# Patient Record
Sex: MALE | Race: WHITE | NOT HISPANIC OR LATINO | Employment: UNEMPLOYED | URBAN - NONMETROPOLITAN AREA
[De-identification: names, ages, dates, MRNs, and addresses within clinical notes are randomized per-mention and may not be internally consistent; named-entity substitution may affect disease eponyms.]

---

## 2017-02-06 ENCOUNTER — APPOINTMENT (OUTPATIENT)
Dept: PREADMISSION TESTING | Facility: HOSPITAL | Age: 47
End: 2017-02-06

## 2017-02-07 ENCOUNTER — TELEPHONE (OUTPATIENT)
Dept: ORTHOPEDIC SURGERY | Facility: CLINIC | Age: 47
End: 2017-02-07

## 2017-02-07 NOTE — TELEPHONE ENCOUNTER
Per conversation with Stephanie Silva Front Office  for Dr Bar,   Mr. Mcclure spoke with Renetta, stated he woke up sick this morning and he reschedule his appointment.  She ask if he was going to continue with surgery tomorrow, he stated no he wanted to cancel, his Cancer doctor was afraid of cancer spreading. New appointment with Dr Bar is 2/14/17 10:10    Spoke with Sarah at [facility] OR , she is aware of cancellation.    Dr Bar is aware.

## 2017-02-08 ENCOUNTER — APPOINTMENT (OUTPATIENT)
Dept: CT IMAGING | Facility: HOSPITAL | Age: 47
End: 2017-02-08

## 2017-02-08 ENCOUNTER — HOSPITAL ENCOUNTER (EMERGENCY)
Facility: HOSPITAL | Age: 47
Discharge: HOME | End: 2017-02-08
Attending: FAMILY MEDICINE | Admitting: FAMILY MEDICINE

## 2017-02-08 VITALS
TEMPERATURE: 97.4 F | RESPIRATION RATE: 16 BRPM | BODY MASS INDEX: 45.1 KG/M2 | HEART RATE: 102 BPM | WEIGHT: 315 LBS | SYSTOLIC BLOOD PRESSURE: 134 MMHG | HEIGHT: 70 IN | DIASTOLIC BLOOD PRESSURE: 94 MMHG | OXYGEN SATURATION: 98 %

## 2017-02-08 DIAGNOSIS — J01.00 SUBACUTE MAXILLARY SINUSITIS: ICD-10-CM

## 2017-02-08 DIAGNOSIS — R07.9 CHEST PAIN IN ADULT: Primary | ICD-10-CM

## 2017-02-08 LAB
ALBUMIN SERPL-MCNC: 3.9 G/DL (ref 3.5–5)
ALBUMIN/GLOB SERPL: 1.3 G/DL (ref 1.5–2.5)
ALP SERPL-CCNC: 103 U/L (ref 46–116)
ALT SERPL W P-5'-P-CCNC: 98 U/L (ref 10–44)
ANION GAP SERPL CALCULATED.3IONS-SCNC: 4.2 MMOL/L (ref 3.6–11.2)
APTT PPP: 26.1 SECONDS (ref 24.4–31)
AST SERPL-CCNC: 78 U/L (ref 10–34)
BASOPHILS # BLD AUTO: 0.01 10*3/MM3 (ref 0–0.3)
BASOPHILS NFR BLD AUTO: 0.1 % (ref 0–2)
BILIRUB SERPL-MCNC: 0.4 MG/DL (ref 0.2–1.8)
BUN BLD-MCNC: 12 MG/DL (ref 7–21)
BUN/CREAT SERPL: 15 (ref 7–25)
CALCIUM SPEC-SCNC: 9.1 MG/DL (ref 7.7–10)
CHLORIDE SERPL-SCNC: 101 MMOL/L (ref 99–112)
CO2 SERPL-SCNC: 29.8 MMOL/L (ref 24.3–31.9)
CREAT BLD-MCNC: 0.8 MG/DL (ref 0.43–1.29)
DEPRECATED RDW RBC AUTO: 38.1 FL (ref 37–54)
EOSINOPHIL # BLD AUTO: 0.07 10*3/MM3 (ref 0–0.7)
EOSINOPHIL NFR BLD AUTO: 0.9 % (ref 0–5)
ERYTHROCYTE [DISTWIDTH] IN BLOOD BY AUTOMATED COUNT: 12.5 % (ref 11.5–14.5)
GFR SERPL CREATININE-BSD FRML MDRD: 105 ML/MIN/1.73
GLOBULIN UR ELPH-MCNC: 3.1 GM/DL
GLUCOSE BLD-MCNC: 181 MG/DL (ref 70–110)
HCT VFR BLD AUTO: 45 % (ref 42–52)
HGB BLD-MCNC: 15.1 G/DL (ref 14–18)
IMM GRANULOCYTES # BLD: 0.03 10*3/MM3 (ref 0–0.03)
IMM GRANULOCYTES NFR BLD: 0.4 % (ref 0–0.5)
INR PPP: 0.96 (ref 0.8–1.1)
LYMPHOCYTES # BLD AUTO: 1.51 10*3/MM3 (ref 1–3)
LYMPHOCYTES NFR BLD AUTO: 20.5 % (ref 21–51)
MCH RBC QN AUTO: 28.5 PG (ref 27–33)
MCHC RBC AUTO-ENTMCNC: 33.6 G/DL (ref 33–37)
MCV RBC AUTO: 84.9 FL (ref 80–94)
MONOCYTES # BLD AUTO: 0.61 10*3/MM3 (ref 0.1–0.9)
MONOCYTES NFR BLD AUTO: 8.3 % (ref 0–10)
NEUTROPHILS # BLD AUTO: 5.14 10*3/MM3 (ref 1.4–6.5)
NEUTROPHILS NFR BLD AUTO: 69.8 % (ref 30–70)
OSMOLALITY SERPL CALC.SUM OF ELEC: 274.4 MOSM/KG (ref 273–305)
PLATELET # BLD AUTO: 177 10*3/MM3 (ref 130–400)
PMV BLD AUTO: 9.8 FL (ref 6–10)
POTASSIUM BLD-SCNC: 3.9 MMOL/L (ref 3.5–5.3)
PROT SERPL-MCNC: 7 G/DL (ref 6–8)
PROTHROMBIN TIME: 10.8 SECONDS (ref 9.8–11.9)
RBC # BLD AUTO: 5.3 10*6/MM3 (ref 4.7–6.1)
SODIUM BLD-SCNC: 135 MMOL/L (ref 135–153)
WBC NRBC COR # BLD: 7.37 10*3/MM3 (ref 4.5–12.5)

## 2017-02-08 PROCEDURE — 96374 THER/PROPH/DIAG INJ IV PUSH: CPT

## 2017-02-08 PROCEDURE — 71275 CT ANGIOGRAPHY CHEST: CPT | Performed by: RADIOLOGY

## 2017-02-08 PROCEDURE — 25010000002 KETOROLAC TROMETHAMINE PER 15 MG: Performed by: FAMILY MEDICINE

## 2017-02-08 PROCEDURE — 85025 COMPLETE CBC W/AUTO DIFF WBC: CPT | Performed by: FAMILY MEDICINE

## 2017-02-08 PROCEDURE — 36415 COLL VENOUS BLD VENIPUNCTURE: CPT

## 2017-02-08 PROCEDURE — 71275 CT ANGIOGRAPHY CHEST: CPT

## 2017-02-08 PROCEDURE — 85730 THROMBOPLASTIN TIME PARTIAL: CPT | Performed by: FAMILY MEDICINE

## 2017-02-08 PROCEDURE — 85610 PROTHROMBIN TIME: CPT | Performed by: FAMILY MEDICINE

## 2017-02-08 PROCEDURE — 99283 EMERGENCY DEPT VISIT LOW MDM: CPT

## 2017-02-08 PROCEDURE — 80053 COMPREHEN METABOLIC PANEL: CPT | Performed by: FAMILY MEDICINE

## 2017-02-08 PROCEDURE — 0 IOPAMIDOL 61 % SOLUTION: Performed by: FAMILY MEDICINE

## 2017-02-08 RX ORDER — MELOXICAM 15 MG/1
15 TABLET ORAL DAILY
Qty: 15 TABLET | Refills: 0 | Status: SHIPPED | OUTPATIENT
Start: 2017-02-08 | End: 2018-10-19

## 2017-02-08 RX ORDER — CETIRIZINE HYDROCHLORIDE 10 MG/1
10 TABLET ORAL ONCE
Status: COMPLETED | OUTPATIENT
Start: 2017-02-08 | End: 2017-02-08

## 2017-02-08 RX ORDER — FLUTICASONE PROPIONATE 50 MCG
2 SPRAY, SUSPENSION (ML) NASAL 2 TIMES DAILY
Status: DISCONTINUED | OUTPATIENT
Start: 2017-02-08 | End: 2017-02-08 | Stop reason: HOSPADM

## 2017-02-08 RX ORDER — FLUTICASONE PROPIONATE 50 MCG
2 SPRAY, SUSPENSION (ML) NASAL 2 TIMES DAILY
Qty: 1 EACH | Refills: 0 | Status: SHIPPED | OUTPATIENT
Start: 2017-02-08 | End: 2017-03-10

## 2017-02-08 RX ORDER — KETOROLAC TROMETHAMINE 30 MG/ML
30 INJECTION, SOLUTION INTRAMUSCULAR; INTRAVENOUS ONCE
Status: COMPLETED | OUTPATIENT
Start: 2017-02-08 | End: 2017-02-08

## 2017-02-08 RX ORDER — OXYMETAZOLINE HYDROCHLORIDE 0.05 G/100ML
3 SPRAY NASAL AS NEEDED
Status: DISCONTINUED | OUTPATIENT
Start: 2017-02-08 | End: 2017-02-08 | Stop reason: HOSPADM

## 2017-02-08 RX ORDER — AZELASTINE HCL 205.5 UG/1
2 SPRAY NASAL 2 TIMES DAILY
Qty: 30 ML | Refills: 0 | Status: SHIPPED | OUTPATIENT
Start: 2017-02-08 | End: 2017-03-29

## 2017-02-08 RX ORDER — CETIRIZINE HYDROCHLORIDE 10 MG/1
10 TABLET ORAL DAILY
Qty: 30 TABLET | Refills: 0 | Status: SHIPPED | OUTPATIENT
Start: 2017-02-08 | End: 2017-03-10

## 2017-02-08 RX ORDER — SODIUM CHLORIDE 0.9 % (FLUSH) 0.9 %
10 SYRINGE (ML) INJECTION AS NEEDED
Status: DISCONTINUED | OUTPATIENT
Start: 2017-02-08 | End: 2017-02-08 | Stop reason: HOSPADM

## 2017-02-08 RX ADMIN — FLUTICASONE PROPIONATE 2 SPRAY: 50 SPRAY, METERED NASAL at 12:00

## 2017-02-08 RX ADMIN — OXYMETAZOLINE HYDROCHLORIDE 3 SPRAY: 5 SPRAY NASAL at 12:26

## 2017-02-08 RX ADMIN — CETIRIZINE HYDROCHLORIDE 10 MG: 10 TABLET, FILM COATED ORAL at 12:00

## 2017-02-08 RX ADMIN — IOPAMIDOL 100 ML: 612 INJECTION, SOLUTION INTRAVENOUS at 13:15

## 2017-02-08 RX ADMIN — KETOROLAC TROMETHAMINE 30 MG: 30 INJECTION, SOLUTION INTRAMUSCULAR; INTRAVENOUS at 12:00

## 2017-02-08 NOTE — ED PROVIDER NOTES
Subjective   History of Present Illness  45 y/o M w/h/o DVT and PE p/w back pain that has been ongoing for 3 weeks. Pt states that this feels similar to previous episodes of PE he has had in the past. Pt has a family h/o PE but denies clotting disorders. Pt has had multiple PE's but is not currently anticoagulated. 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   Review of Systems   Constitutional: Negative for activity change, appetite change, chills and fever.   HENT: Negative for congestion, trouble swallowing and voice change.    Eyes: Negative for pain and redness.   Respiratory: Negative for apnea, cough, shortness of breath and wheezing.    Cardiovascular: Positive for chest pain. Negative for palpitations and leg swelling.   Gastrointestinal: Negative for abdominal distention, abdominal pain, constipation, nausea and vomiting.   Genitourinary: Negative for difficulty urinating, dysuria and flank pain.   Musculoskeletal: Positive for back pain. Negative for arthralgias.   Hematological: Does not bruise/bleed easily.       Past Medical History   Diagnosis Date   • Cancer    • Degenerative joint disease    • Diabetes mellitus    • DVT, lower extremity      left 2013   • Hypertension    • Peroneal DVT (deep venous thrombosis)      in 1998 and dvt 2010   • Spinal stenosis    • Urolithiasis        No Known Allergies    Past Surgical History   Procedure Laterality Date   • Cystoscopy w/ litholapaxy / ehl     • Colonoscopy     • Inner ear surgery         Family History   Problem Relation Age of Onset   • Other Other      deveral siblings havd had early onset DVTs       Social History     Social History   • Marital status:      Spouse name: N/A   • Number of children: N/A   • Years of education: N/A     Social History Main Topics   • Smoking status: Former Smoker     Years: 20.00   • Smokeless tobacco: Current User     Types: Chew      Comment: occasionally uses chew once a year     • Alcohol use No      Comment: beer occasionally   • Drug use: No   • Sexual activity: Not Asked     Other Topics Concern   • None     Social History Narrative           Objective   Physical Exam   Constitutional: He is oriented to person, place, and time. Vital signs are normal. He appears well-developed and well-nourished. He is active.   HENT:   Head: Normocephalic and atraumatic.   Right Ear: Hearing and external ear normal.   Left Ear: Hearing and external ear normal.   Nose: Nose normal.   Mouth/Throat: Oropharynx is clear and moist.   Eyes: Conjunctivae and EOM are normal. Pupils are equal, round, and reactive to light.   Neck: Trachea normal, normal range of motion, full passive range of motion without pain and phonation normal. Neck supple.   Cardiovascular: Normal rate, regular rhythm and normal heart sounds.    Pulmonary/Chest: Effort normal and breath sounds normal.   Abdominal: Soft. Normal appearance.   Musculoskeletal: Normal range of motion.   Neurological: He is alert and oriented to person, place, and time.   Skin: Skin is warm, dry and intact.   Psychiatric: He has a normal mood and affect. His speech is normal and behavior is normal. Judgment and thought content normal. Cognition and memory are normal.   Nursing note and vitals reviewed.      Procedures         ED Course  ED Course   Value Comment By Time   CT Chest Pulmonary Embolism With Contrast (Reviewed) Iam Hatfield MD 02/08 1341                  MDM  Number of Diagnoses or Management Options  Chest pain in adult: new and requires workup  Subacute maxillary sinusitis: new and requires workup     Amount and/or Complexity of Data Reviewed  Clinical lab tests: ordered and reviewed  Tests in the radiology section of CPT®: reviewed and ordered    Risk of Complications, Morbidity, and/or Mortality  Presenting problems: high  Diagnostic procedures: high  Management options: high    Patient Progress  Patient progress:  improved      Final diagnoses:   Chest pain in adult   Subacute maxillary sinusitis            Iam Hatfield MD  02/08/17 4237

## 2017-02-14 ENCOUNTER — OFFICE VISIT (OUTPATIENT)
Dept: ORTHOPEDIC SURGERY | Facility: CLINIC | Age: 47
End: 2017-02-14

## 2017-02-14 VITALS — WEIGHT: 315 LBS | BODY MASS INDEX: 45.1 KG/M2 | HEIGHT: 70 IN

## 2017-02-14 DIAGNOSIS — M19.019 OSTEOARTHRITIS OF ACROMIOCLAVICULAR JOINT: Primary | ICD-10-CM

## 2017-02-14 PROCEDURE — 20605 DRAIN/INJ JOINT/BURSA W/O US: CPT | Performed by: ORTHOPAEDIC SURGERY

## 2017-02-14 RX ORDER — METHYLPREDNISOLONE ACETATE 40 MG/ML
80 INJECTION, SUSPENSION INTRA-ARTICULAR; INTRALESIONAL; INTRAMUSCULAR; SOFT TISSUE ONCE
Status: COMPLETED | OUTPATIENT
Start: 2017-02-14 | End: 2017-02-14

## 2017-02-14 RX ORDER — BUPIVACAINE HYDROCHLORIDE 5 MG/ML
5 INJECTION, SOLUTION EPIDURAL; INTRACAUDAL ONCE
Status: COMPLETED | OUTPATIENT
Start: 2017-02-14 | End: 2017-02-14

## 2017-02-14 RX ADMIN — METHYLPREDNISOLONE ACETATE 80 MG: 40 INJECTION, SUSPENSION INTRA-ARTICULAR; INTRALESIONAL; INTRAMUSCULAR; SOFT TISSUE at 14:00

## 2017-02-14 RX ADMIN — BUPIVACAINE HYDROCHLORIDE 5 ML: 5 INJECTION, SOLUTION EPIDURAL; INTRACAUDAL at 14:00

## 2017-02-14 NOTE — PROGRESS NOTES
Alek Munoz   :1970    Date of encounter:2017        HPI:  Alek Munoz is a 46 y.o. male who returns today for follow-up of right shoulder pain.  We've previously discussed proceeding with a arthroscopy of the right shoulder with acromioplasty and excision of the lateral clavicle.  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  And his oncologist recommend holding off on surgery at this time.  He is requesting another injection into the acromioclavicular joint.      Exam:  On examination he still is markedly tenderness along the acromioclavicular joint.  He has positive crossarm adduction.  His neurovascular status is intact      Assessment    ICD-10-CM ICD-9-CM   1. Osteoarthritis of acromioclavicular joint M19.019 715.91       Plan:  A 46-year-old male with acromioclavicular joint arthritis.  Were unable to proceed with surgery at this time.  We will give him another injection to try to alleviate his pain.  Today we proceeded with 80 mg of Depo-Medrol with Marcaine block intra-articular into the acromioclavicular joint of the right shoulder.  He'll return back with any recurrence of symptoms.    Written by, Shea POWELL, acting as a scribe for Dr. Yosvany LUJAN, Aubrey Bar MD, personally performed the services described in this documentation as scribed by the above named individual in my presence, and it is both accurate and complete.  2017  4:37 PM      CC Dr. Argueta

## 2017-03-29 ENCOUNTER — OFFICE VISIT (OUTPATIENT)
Dept: ONCOLOGY | Facility: CLINIC | Age: 47
End: 2017-03-29

## 2017-03-29 VITALS
RESPIRATION RATE: 20 BRPM | HEART RATE: 81 BPM | BODY MASS INDEX: 46.92 KG/M2 | TEMPERATURE: 97.5 F | WEIGHT: 315 LBS | OXYGEN SATURATION: 98 % | SYSTOLIC BLOOD PRESSURE: 140 MMHG | DIASTOLIC BLOOD PRESSURE: 86 MMHG

## 2017-03-29 DIAGNOSIS — D11.0 BENIGN NEOPLASM PAROTID GLAND: Primary | ICD-10-CM

## 2017-03-29 PROCEDURE — 99214 OFFICE O/P EST MOD 30 MIN: CPT | Performed by: INTERNAL MEDICINE

## 2017-03-29 RX ORDER — HYDROXYZINE HYDROCHLORIDE 25 MG/1
TABLET, FILM COATED ORAL
COMMUNITY
Start: 2017-03-23 | End: 2018-11-13

## 2017-03-29 RX ORDER — MECLIZINE HYDROCHLORIDE 25 MG/1
TABLET ORAL
COMMUNITY
Start: 2017-03-23 | End: 2018-10-19

## 2017-03-29 RX ORDER — HYDROCODONE BITARTRATE AND ACETAMINOPHEN 7.5; 325 MG/1; MG/1
TABLET ORAL
COMMUNITY
Start: 2017-03-22 | End: 2017-11-29

## 2017-03-29 NOTE — PROGRESS NOTES
Name:  Alek Munoz  :  1970  Date:  3/29/2017     PRIMARY CARE PHYSICIAN  Stephen Argueta MD    REASON FOR FOLLOWUP  1. Benign neoplasm parotid gland      CHIEF COMPLAINT  Right-sided parotid gland mass and chronic back pain.     Dear Dr. Argueta,    HISTORY OF PRESENT ILLNESS:   I saw Mr. Munoz in follow up today in our medical oncology clinic.  As you are aware, he is a pleasant, 46 y.o., white male who was diagnosed with a DVT in early 2013 after presenting to the [facility] ED with a two day history of worsening left leg pain and swelling. He was briefly hospitalized in order to start anticoagulation with Xarelto. I initially saw him during this hospitalization. At that time, he reported a history of severe traumatic injury in , when a truck he was working underneath rolled off a lift and landed on him. This accident resulted in multiple contusions and a displaced right upper extremity (for which he refused surgery). Apparently, both around that time (in ), and again in [id number] , he experienced a DVT. He also stated that multiple family members (including several siblings) have all had DVTs in the recent past (although he has been unable to provide any specifics). Of note, around the time of the most recent, documented DVT (in 2013), he had been much less mobile than usual for the previous few months as he had been experiencing worse problems with his chronic back and sciatic nerve pain (which have both been chronic issues si nce the truck incident). Regardless, with his personal and family history, as well as a positive antiphosphatidylserine antibody (identified on the hypercoagulable panel ordered in our office in 2014), indefinite anticoagulation (and he was tolerating Xarelto well) was recommended. He was subsequently lost to follow up (by 2014), but re-presented to our clinic in mid-2015 with a new issue: a persistent, right parotid  gland mass. He first noticed this enlarging area around November/December 2014. 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 At the time of his re-presentation to our clinic, further evaluation with a new PET scan and FNA biopsy were ordered; and these confirmed a relatively benign tumor, with no noticeable progression during the previous ~ five to six months.  He has elected to continue to defer any consideration of a debulking/curative surgery to date, due to the risks involved (of bleeding, facial paralysis, etc.). Meanwhile, he discontinued Xarelto on his own in mid-2014 and has had no issues with recurrent, thrombotic events since then.    INTERIM HISTORY:  Mr. Munoz returns to clinic today for follow up again accompanied by his wife. Other than his chronic back pain and occasional sharp pains near the right parotid gland mass (along with decreased beard growth in this area), he continues to feel overall well and has no specific complaints. He did meet with [facility] since we last saw him ~ five months ago, and they are willing to perform the resection; however he remains reluctant to follow through with the surgery.    Past Medical History:   Diagnosis Date   • Cancer    • Degenerative joint disease    • Diabetes mellitus    • DVT, lower extremity     left 2013   • Hypertension    • Peroneal DVT (deep venous thrombosis)     in 1998 and dvt 2010   • Spinal stenosis    • Urolithiasis        Past Surgical History:   Procedure Laterality Date   • COLONOSCOPY     • CYSTOSCOPY W/ LITHOLAPAXY / EHL     • INNER EAR SURGERY         Social History     Social History   • Marital status:      Spouse name: N/A   • Number of  children: N/A   • Years of education: N/A     Occupational History   • Not on file.     Social History Main Topics   • Smoking status: Former Smoker     Years: 20.00   • Smokeless tobacco: Current User     Types: Chew      Comment: occasionally uses chew once a year    • Alcohol use No      Comment: beer occasionally   • Drug use: No   • Sexual activity: Not on file     Other Topics Concern   • Not on file     Social History Narrative       Family History   Problem Relation Age of Onset   • Other Other      deveral siblings havd had early onset DVTs       No Known Allergies    Current Outpatient Prescriptions   Medication Sig Dispense Refill   • butalbital-acetaminophen-caffeine (FIORICET, ESGIC) -40 MG per tablet      • gabapentin (NEURONTIN) 800 MG tablet      • hydrochlorothiazide (HYDRODIURIL) 25 MG tablet      • HYDROcodone-acetaminophen (NORCO) 7.5-325 MG per tablet      • hydrOXYzine (ATARAX) 25 MG tablet      • lidocaine-prilocaine (EMLA) 2.5-2.5 % cream      • meclizine (ANTIVERT) 25 MG tablet      • meloxicam (MOBIC) 15 MG tablet Take 1 tablet by mouth Daily. 15 tablet 0   • omeprazole (PriLOSEC) 20 MG capsule      • PHENTERMINE HCL PO Take  by mouth daily.     • potassium chloride (K-DUR,KLOR-CON) 10 MEQ CR tablet      • tiZANidine (ZANAFLEX) 4 MG tablet      • vitamin D (ERGOCALCIFEROL) 25854 UNITS capsule capsule Take 50,000 Units by mouth 1 (one) time per week.       No current facility-administered medications for this visit.        REVIEW OF SYSTEMS  CONSTITUTIONAL: No fever, chills, night sweats or weight loss.    EYES: No blurry vision, diplopia or other vision changes.    ENT: No hearing loss, nosebleeds or sore throat. Parotid gland mass, as per the HPI above.    CARDIOVASCULAR: No palpitations, arrhythmia, syncopal episodes or edema.    PULMONARY: No shortness of breath, hemoptysis, wheezing or chronic cough.    GASTROINTESTINAL: No nausea or vomiting. No constipation or diarrhea. No  change in appetite. No abdominal pain.    GENITOURINARY: No hematuria, kidney stones or frequent urination.    MUSCULOSKELETAL: Chronic back pain, stable.  INTEGUMENTARY: No rashes or pruritus.    ENDOCRINE: No excessive thirst or hot flashes.    HEMATOLOGIC: No history of free bleeding or spontaneous bleeding. Thrombosis history as above, last confirmed LLE DVT in December 2013.    IMMUNOLOGIC: No allergies or frequent infections.    NEUROLOGIC: No numbness, tingling, seizures or weakness.    PSYCHIATRIC: No anxiety or depression.      PHYSICAL EXAMINATION    /86  Pulse 81  Temp 97.5 °F (36.4 °C) (Oral)   Resp 20  Wt (!) 327 lb (148 kg)  SpO2 98%  BMI 46.92 kg/m2    GENERAL:  A well-developed, well-nourished, morbidly obese, white male in no acute distress.  HEENT:  Pupils equally round and reactive to light.  Extraocular muscles intact.  Enlarged, firm, right-sided parotid mass, very slowly, but steadily, increasing in size.  CARDIOVASCULAR:  Regular rate and rhythm.  No murmurs, gallops or rubs.  LUNGS:  Clear to auscultation bilaterally.  ABDOMEN:  Soft, nontender, nondistended with positive bowel sounds.  EXTREMITIES:  No clubbing, cyanosis or edema bilaterally.  SKIN:  No rashes or petechiae.  NEURO:  Cranial nerves grossly intact.  No focal deficits.  PSYCH:  Alert and oriented x3.    LABORATORY    Lab Results   Component Value Date    WBC 7.37 02/08/2017    HGB 15.1 02/08/2017    HCT 45.0 02/08/2017    MCV 84.9 02/08/2017     02/08/2017    NEUTROABS 5.14 02/08/2017       Lab Results   Component Value Date     02/08/2017    K 3.9 02/08/2017     02/08/2017    CO2 29.8 02/08/2017    BUN 12 02/08/2017    CREATININE 0.80 02/08/2017    GLUCOSE 181 (H) 02/08/2017    CALCIUM 9.1 02/08/2017    AST 78 (H) 02/08/2017    ALT 98 (H) 02/08/2017    ALKPHOS 103 02/08/2017    BILITOT 0.4 02/08/2017    PROTEINTOT 7.0 02/08/2017    ALBUMIN 3.90 02/08/2017     Hypercoagulable studies (03/19/2014):     Factor V Leiden mutation: Negative; Factor II, DNA analysis (Prothrombin [id number] mutation): Negative    Antithrombin activity: 98%; Antithrombin antigen: 86%    Protein C antigen: 100%; Protein C-functional: 149%; Activated Protein C resistance: 3.0    Protein S total: 110%; Protein S free: 83%; Protein S-functional: 115%      Beta-2 glycoprotein I Ab, IgG: < 9; IgA: < 9; IgM: < 9    Anticardiolipin Ab, IgG: < 9; IgA: < 9; IgM: < 9    Antiphosphatidylserine Ig; IgA: <1; IgM: 6      Lupus anticoagulant Reflex: PTT-LA: 49.6; dRVVT: 68.8; dRVVT Mix: 50.2; dRVVT confirm: 1.2    (No lupus anticoagulant detected)      IMAGING  Duplex scan venous left lower extremity (2013):    Impression: Exam positive for DVT in the left popliteal and peroneal veins.      Duplex scan venous left lower extremity (2014):    Impression: Residual deep vein thrombosis in the left peroneal vein but the popliteal vein does not show deep vein thrombosis at this time.      CT neck soft tissue with contrast (2015):    Impression: 2.6 cm mass in the right parotid gland.    NM PET with fusion CT, skull base to mid-thigh (2015, at ):    Impression: Intensely hypermetabolic mass centered within the deep lobe of the right parotid gland without evidence of abnormal FDG hypermetabolism elsewhere.      NM PET with fusion CT, skull base to mid-thigh (2015):  Impression: Intense hypermetabolic activity within the right masseter muscle. There is no hypermetabolic activity within the right parotid mass. Physiologic hypermetabolism only elsewhere.    CT soft tissue neck with contrast (2016):  Impression: 3.3 cm well-circumscribed right parotid gland mass.    CT chest with contrast (2016):  Impression: Stable evaluation of the chest.    PATHOLOGY  Parotid gland, right, mass, superficial FNA (2015):    Malignant spindled and epithelioid neoplasm.      Parotid gland, right, mass, superficial FNA (  12/01/2015):    Malignant spindled and epithelioid neoplasm.     IMPRESSION AND PLAN  Mr. Munoz is a 46 y.o., white male with:  1. Parotid gland mass: Biopsied in both May and December 2015, with, both times, a diagnosis of a non-specific malignancy. A PET scan in June 2015 at the [facility] (summarized above) showed no evidence of hypermetabolic activity anywhere outside the parotid gland; and, a repeat PET scan about five months later, showed no evidence of either progression or metastatic spread. The parotid mass itself was not hypermetabolic at that time, while the cheek around it (likely due to inflammation) was the only area that was (hypermetabolic). I have had multiple long discussions with the patient and his wife in clinic over the past ~ two years regarding these findings. While a specific diagnosis has eluded us, the good news has been that this has been a fairly benign tumor (the most recent repeat imaging, CTs from mid-August 2016, summarized above, again show continued slow growth of the parotid tumor without evidence of disease elsewhere). Therefore, excisional surgery is the mainstay of both diagnosis and therapy (which has always been, and still is, recommended, as this tumor will undoubtedly only steadily continue to grow and cause progressive, localized symptoms over time), as there are no chemotherapeutic and/or radiotherapeutic options for such a slowly growing tumor. Unfortunately, given the mass' location within the parotid gland (and consequently, its involvement of fragile structures such as the facial nerve), this surgery would likely be relatively complicated. At his previous office visit (in October 2016), he was finally agreeable to being referred to an academic center for an additional surgical opinion. [facility] has now agreed to perform the resection, but the patient remains reluctant to actually go through with the procedure. Until we (or his wife) can convince him,  we will continue to monitor this issue with periodic repeat scans. We will see him back in clinic in one month with repeat CTs of the neck and chest with contrast.  2. DVT: The patient has experienced two other acute events besides the documented lower extremity DVT in December 2013 (a PE at the time of his injury in 1998 and a DVT in his injured, right upper arm around 2008 or 2009) . Regardless, the patient's history of severe traumatic injury has resulted in permanent damage to his right arm as well as to occasional periods of relative immobility (due to chronic, and at times worsening, back and referred nerve damage and pain). These are significant, chronic factors that have, and will continue to, increase his risk of thrombosis. Additionally, the results of the hypercoagulable studies sent in March 2014 (which are summarized above), while largely unremarkable, do show the possible presence of an antiphosphatidylserine antibody (the dRVVT results are consistent with, and likely due to, taking Xarelto; the other studies were within normal limits). He completed a total of six months of treatment by summer 2015; but, ultimately, his combination of lab results and clinical history led us to recommend indefinite anticoagulation. However, the patient discontinued the Xarelto in mid-2014 on his own. He has not had any issues with recurrent thrombotic events since then. Continue to monitor.    The patient and his wife were in agreement with these plans.      It is a pleasure to participate in Mr. Munoz's care. Please do not hesitate to call with any questions or concerns that you may have.    A total of 30 minutes were spent coordinating this patient’s care in clinic today; 25 minutes of which were face-to-face with the patient, reviewing his interim medical history and counseling on the current evaluation and followup plan.  All questions were answered to their satisfaction.    FOLLOW UP  With [facility] for  surgical resection of the right-sided parotid mass as soon as the patient is agreeable. Return to our clinic in 1 month with repeat CTs of the neck and chest with contrast.      This document was electronically signed by HUE Bonilla MD March 29, 2017 12:01 PM      CC: MD Sherman Walker MD

## 2017-04-20 DIAGNOSIS — D11.0 BENIGN NEOPLASM PAROTID GLAND: Primary | ICD-10-CM

## 2017-04-21 ENCOUNTER — APPOINTMENT (OUTPATIENT)
Dept: CT IMAGING | Facility: HOSPITAL | Age: 47
End: 2017-04-21
Attending: INTERNAL MEDICINE

## 2017-05-05 ENCOUNTER — HOSPITAL ENCOUNTER (OUTPATIENT)
Dept: PET IMAGING | Facility: HOSPITAL | Age: 47
Discharge: HOME | End: 2017-05-05
Attending: INTERNAL MEDICINE | Admitting: INTERNAL MEDICINE

## 2017-05-05 DIAGNOSIS — D11.0 BENIGN NEOPLASM PAROTID GLAND: ICD-10-CM

## 2017-05-05 PROCEDURE — 78815 PET IMAGE W/CT SKULL-THIGH: CPT

## 2017-05-05 PROCEDURE — A9552 F18 FDG: HCPCS | Performed by: INTERNAL MEDICINE

## 2017-05-05 PROCEDURE — 0 FLUDEOXYGLUCOSE F18 SOLUTION: Performed by: INTERNAL MEDICINE

## 2017-05-05 PROCEDURE — 78815 PET IMAGE W/CT SKULL-THIGH: CPT | Performed by: RADIOLOGY

## 2017-05-05 RX ADMIN — FLUDEOXYGLUCOSE F18 1 DOSE: 300 INJECTION INTRAVENOUS at 08:17

## 2017-05-10 ENCOUNTER — OFFICE VISIT (OUTPATIENT)
Dept: ONCOLOGY | Facility: CLINIC | Age: 47
End: 2017-05-10

## 2017-05-10 VITALS
DIASTOLIC BLOOD PRESSURE: 85 MMHG | RESPIRATION RATE: 18 BRPM | WEIGHT: 315 LBS | TEMPERATURE: 97.7 F | OXYGEN SATURATION: 97 % | SYSTOLIC BLOOD PRESSURE: 132 MMHG | HEART RATE: 72 BPM | BODY MASS INDEX: 46.42 KG/M2

## 2017-05-10 DIAGNOSIS — D11.0 BENIGN NEOPLASM PAROTID GLAND: Primary | ICD-10-CM

## 2017-05-10 PROCEDURE — 99214 OFFICE O/P EST MOD 30 MIN: CPT | Performed by: INTERNAL MEDICINE

## 2017-10-06 ENCOUNTER — HOSPITAL ENCOUNTER (EMERGENCY)
Facility: HOSPITAL | Age: 47
Discharge: HOME | End: 2017-10-06
Attending: EMERGENCY MEDICINE | Admitting: EMERGENCY MEDICINE

## 2017-10-06 VITALS
BODY MASS INDEX: 45.1 KG/M2 | HEART RATE: 94 BPM | RESPIRATION RATE: 18 BRPM | WEIGHT: 315 LBS | SYSTOLIC BLOOD PRESSURE: 149 MMHG | DIASTOLIC BLOOD PRESSURE: 93 MMHG | TEMPERATURE: 98.3 F | HEIGHT: 70 IN | OXYGEN SATURATION: 96 %

## 2017-10-06 DIAGNOSIS — J01.00 ACUTE NON-RECURRENT MAXILLARY SINUSITIS: Primary | ICD-10-CM

## 2017-10-06 PROCEDURE — 25010000002 DEXAMETHASONE PER 1 MG: Performed by: PHYSICIAN ASSISTANT

## 2017-10-06 PROCEDURE — 25010000002 CEFTRIAXONE PER 250 MG: Performed by: PHYSICIAN ASSISTANT

## 2017-10-06 PROCEDURE — 96372 THER/PROPH/DIAG INJ SC/IM: CPT

## 2017-10-06 PROCEDURE — 99283 EMERGENCY DEPT VISIT LOW MDM: CPT

## 2017-10-06 RX ORDER — HYDROCODONE BITARTRATE AND ACETAMINOPHEN 10; 325 MG/1; MG/1
1 TABLET ORAL 3 TIMES DAILY
COMMUNITY
End: 2018-11-13

## 2017-10-06 RX ORDER — LIDOCAINE HYDROCHLORIDE 10 MG/ML
2.1 INJECTION, SOLUTION EPIDURAL; INFILTRATION; INTRACAUDAL; PERINEURAL ONCE
Status: COMPLETED | OUTPATIENT
Start: 2017-10-06 | End: 2017-10-06

## 2017-10-06 RX ORDER — METHYLPREDNISOLONE 4 MG/1
TABLET ORAL
Qty: 21 TABLET | Refills: 0 | Status: SHIPPED | OUTPATIENT
Start: 2017-10-06 | End: 2017-11-29

## 2017-10-06 RX ORDER — CEFDINIR 300 MG/1
300 CAPSULE ORAL 2 TIMES DAILY
Qty: 20 CAPSULE | Refills: 0 | Status: SHIPPED | OUTPATIENT
Start: 2017-10-06 | End: 2017-11-29

## 2017-10-06 RX ORDER — DEXAMETHASONE SODIUM PHOSPHATE 4 MG/ML
8 INJECTION, SOLUTION INTRA-ARTICULAR; INTRALESIONAL; INTRAMUSCULAR; INTRAVENOUS; SOFT TISSUE ONCE
Status: COMPLETED | OUTPATIENT
Start: 2017-10-06 | End: 2017-10-06

## 2017-10-06 RX ORDER — CEFTRIAXONE 1 G/1
1000 INJECTION, POWDER, FOR SOLUTION INTRAMUSCULAR; INTRAVENOUS ONCE
Status: COMPLETED | OUTPATIENT
Start: 2017-10-06 | End: 2017-10-06

## 2017-10-06 RX ADMIN — DEXAMETHASONE SODIUM PHOSPHATE 8 MG: 4 INJECTION, SOLUTION INTRAMUSCULAR; INTRAVENOUS at 02:49

## 2017-10-06 RX ADMIN — LIDOCAINE HYDROCHLORIDE 2.1 ML: 10 INJECTION, SOLUTION EPIDURAL; INFILTRATION; INTRACAUDAL; PERINEURAL at 02:50

## 2017-10-06 RX ADMIN — CEFTRIAXONE 1000 MG: 1 INJECTION, POWDER, FOR SOLUTION INTRAMUSCULAR; INTRAVENOUS at 02:49

## 2017-10-06 NOTE — ED NOTES
Pt c/o sore throat and left sided ear ache since yesterday, states he feels like he might have strep or and ear infection. States this has been bothering him since yesterday morning     Gia Ramirez RN  10/06/17 2437

## 2017-10-06 NOTE — ED PROVIDER NOTES
Subjective   Patient is a 47 y.o. male presenting with URI.   History provided by:  Patient  URI   Presenting symptoms: congestion, ear pain and sore throat    Presenting symptoms: no fever    Severity:  Moderate  Onset quality:  Sudden  Duration:  1 day  Timing:  Constant  Progression:  Worsening  Chronicity:  New  Relieved by:  Nothing  Ineffective treatments:  None tried      Review of Systems   Constitutional: Negative.  Negative for fever.   HENT: Positive for congestion, ear pain and sore throat.    Respiratory: Negative.    Cardiovascular: Negative.  Negative for chest pain.   Gastrointestinal: Negative.  Negative for abdominal pain.   Endocrine: Negative.    Genitourinary: Negative.  Negative for dysuria.   Skin: Negative.    Neurological: Negative.    Psychiatric/Behavioral: Negative.    All other systems reviewed and are negative.      Past Medical History:   Diagnosis Date   • Cancer    • Degenerative joint disease    • Diabetes mellitus    • DVT, lower extremity     left 2013   • Hypertension    • Peroneal DVT (deep venous thrombosis)     in 1998 and dvt 2010   • Spinal stenosis    • Urolithiasis        No Known Allergies    Past Surgical History:   Procedure Laterality Date   • COLONOSCOPY     • CYSTOSCOPY W/ LITHOLAPAXY / EHL     • INNER EAR SURGERY         Family History   Problem Relation Age of Onset   • Other Other      deveral siblings havd had early onset DVTs       Social History     Social History   • Marital status:      Spouse name: N/A   • Number of children: N/A   • Years of education: N/A     Social History Main Topics   • Smoking status: Former Smoker     Years: 20.00   • Smokeless tobacco: Current User     Types: Chew      Comment: occasionally uses chew once a year    • Alcohol use No      Comment: beer occasionally   • Drug use: No   • Sexual activity: Not Asked     Other Topics Concern   • None     Social History Narrative           Objective   Physical Exam   Constitutional:  He is oriented to person, place, and time. He appears well-developed and well-nourished. No distress.   HENT:   Head: Normocephalic and atraumatic.   Nose: Nose normal.   Eyes: Conjunctivae and EOM are normal. Pupils are equal, round, and reactive to light.   Neck: Normal range of motion. Neck supple. No JVD present. No tracheal deviation present.   Cardiovascular: Normal rate, regular rhythm and normal heart sounds.    No murmur heard.  Pulmonary/Chest: Effort normal and breath sounds normal. No respiratory distress. He has no wheezes.   Abdominal: Soft. Bowel sounds are normal. There is no tenderness.   Musculoskeletal: Normal range of motion. He exhibits no edema or deformity.   Neurological: He is alert and oriented to person, place, and time. No cranial nerve deficit.   Skin: Skin is warm and dry. No rash noted. He is not diaphoretic. No erythema. No pallor.   Psychiatric: He has a normal mood and affect. His behavior is normal. Thought content normal.   Nursing note and vitals reviewed.      Procedures         ED Course  ED Course                  MDM  Number of Diagnoses or Management Options  minor  Risk of Complications, Morbidity, and/or Mortality  Presenting problems: low  Diagnostic procedures: low  Management options: low    Patient Progress  Patient progress: stable      Final diagnoses:   Acute non-recurrent maxillary sinusitis            SHERRY Bishop  10/06/17 0257

## 2017-11-29 ENCOUNTER — OFFICE VISIT (OUTPATIENT)
Dept: ONCOLOGY | Facility: CLINIC | Age: 47
End: 2017-11-29

## 2017-11-29 VITALS
TEMPERATURE: 98 F | SYSTOLIC BLOOD PRESSURE: 138 MMHG | RESPIRATION RATE: 18 BRPM | DIASTOLIC BLOOD PRESSURE: 87 MMHG | WEIGHT: 315 LBS | BODY MASS INDEX: 46.99 KG/M2 | HEART RATE: 90 BPM | OXYGEN SATURATION: 97 %

## 2017-11-29 DIAGNOSIS — D11.0 BENIGN NEOPLASM PAROTID GLAND: Primary | ICD-10-CM

## 2017-11-29 PROCEDURE — 99214 OFFICE O/P EST MOD 30 MIN: CPT | Performed by: INTERNAL MEDICINE

## 2017-11-29 NOTE — PROGRESS NOTES
Name:  Alek Munoz  :  1970  Date:  2017     PRIMARY CARE PHYSICIAN  Arnol Torres MD    REASON FOR FOLLOWUP  1. Benign neoplasm parotid gland      CHIEF COMPLAINT  Right-sided parotid gland mass and chronic back pain, the former slowly causing progressive symptoms.    Dear Dr. Argueta,    HISTORY OF PRESENT ILLNESS:   I saw Mr. Munoz in follow up today in our medical oncology clinic. As you are aware, he is a pleasant, 47 y.o., white male who was diagnosed with a DVT in early 2013 after presenting to the [facility] ED with a two day history of worsening left leg pain and swelling. He was briefly hospitalized in order to start anticoagulation with Xarelto. I initially saw him during this hospitalization. At that time, he reported a history of severe traumatic injury in , when a truck he was working underneath rolled off a lift and landed on him. This accident resulted in multiple contusions and a displaced right upper extremity (for which he refused surgery). Apparently, both around that time (in ), and again in [id number] , he experienced a DVT. He also stated that multiple family members (including several siblings) have all had DVTs in the recent past (although he has been unable to provide any specifics). Of note, around the time of the most recent, documented DVT (in 2013), he had been much less mobile than usual for the previous few months as he had been experiencing worse problems with his chronic back and sciatic nerve pain (which have both been chronic issues si nce the truck incident). Regardless, with his personal and family history, as well as a positive antiphosphatidylserine antibody (identified on the hypercoagulable panel ordered in our office in 2014), indefinite anticoagulation (and he was tolerating Xarelto well) was recommended. He was subsequently lost to follow up (by 2014), but re-presented to our clinic in mid-2015 with  a new issue: a persistent, right parotid gland mass. He first noticed this enlarging area around November/December 2014. 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 At the time of his re-presentation to our clinic, further evaluation with a new PET scan and FNA biopsy were ordered; and these confirmed a relatively benign tumor, with no noticeable progression during the previous ~ five to six months.  He has elected to continue to defer any consideration of a debulking/curative surgery to date, due to the risks involved (of bleeding, facial paralysis, etc.). Meanwhile, he discontinued Xarelto on his own in mid-2014 and has had no issues with recurrent, thrombotic events since then.    INTERIM HISTORY:  Mr. Munoz returns to clinic today for follow up again accompanied by his wife. Other than his chronic back pain and occasional sharp pains near the right parotid gland mass (along with decreased beard growth in this area), he continues to feel overall well and has no specific complaints. He does admit that the mass is slowly causing more and more issues (including, recently, intermittent referred pains into his right ear). He did meet with [facility] in late 2016, and they have reportedly always been willing to perform the resection; however he remains reluctant to follow through with the surgery. His wife states today that she thinks that she has finally convinced him (the patient), and they plan to call [facility] back this afternoon.    Past Medical History:   Diagnosis Date   • Cancer    • Degenerative joint disease    • Diabetes mellitus    • DVT, lower extremity     left 2013   • Hypertension    • Peroneal DVT (deep  venous thrombosis)     in 1998 and dvt 2010   • Spinal stenosis    • Urolithiasis        Past Surgical History:   Procedure Laterality Date   • COLONOSCOPY     • CYSTOSCOPY W/ LITHOLAPAXY / [facility]     • INNER EAR SURGERY         Social History     Social History   • Marital status:      Spouse name: N/A   • Number of children: N/A   • Years of education: N/A     Occupational History   • Not on file.     Social History Main Topics   • Smoking status: Former Smoker     Years: 20.00   • Smokeless tobacco: Current User     Types: Chew      Comment: occasionally uses chew once a year    • Alcohol use No      Comment: beer occasionally   • Drug use: No   • Sexual activity: Not on file     Other Topics Concern   • Not on file     Social History Narrative       Family History   Problem Relation Age of Onset   • Other Other      deveral siblings havd had early onset DVTs       No Known Allergies    Current Outpatient Prescriptions   Medication Sig Dispense Refill   • butalbital-acetaminophen-caffeine (FIORICET, ESGIC) -40 MG per tablet      • gabapentin (NEURONTIN) 800 MG tablet Daily.     • hydrochlorothiazide (HYDRODIURIL) 25 MG tablet      • HYDROcodone-acetaminophen (NORCO)  MG per tablet Take 1 tablet by mouth 3 (Three) Times a Day.     • hydrOXYzine (ATARAX) 25 MG tablet      • KETOPROFEN PO Take 75 mg by mouth 3 (Three) Times a Day.     • lidocaine-prilocaine (EMLA) 2.5-2.5 % cream      • meclizine (ANTIVERT) 25 MG tablet      • meloxicam (MOBIC) 15 MG tablet Take 1 tablet by mouth Daily. 15 tablet 0   • omeprazole (PriLOSEC) 20 MG capsule      • PHENTERMINE HCL PO Take  by mouth daily.     • potassium chloride (K-DUR,KLOR-CON) 10 MEQ CR tablet      • tiZANidine (ZANAFLEX) 4 MG tablet      • vitamin D (ERGOCALCIFEROL) 34796 UNITS capsule capsule Take 50,000 Units by mouth 1 (one) time per week.       No current facility-administered medications for this visit.        REVIEW OF SYSTEMS  CONSTITUTIONAL:  No fever, chills, night sweats or weight loss.    EYES: No blurry vision, diplopia or other vision changes.    ENT: No hearing loss, nosebleeds or sore throat. Parotid gland mass, as per the HPI above.    CARDIOVASCULAR: No palpitations, arrhythmia, syncopal episodes or edema.    PULMONARY: No shortness of breath, hemoptysis, wheezing or chronic cough.    GASTROINTESTINAL: No nausea or vomiting. No constipation or diarrhea. No change in appetite. No abdominal pain.    GENITOURINARY: No hematuria, kidney stones or frequent urination.    MUSCULOSKELETAL: Chronic back pain.  INTEGUMENTARY: No rashes or pruritus.    ENDOCRINE: No excessive thirst or hot flashes.    HEMATOLOGIC: No history of free bleeding or spontaneous bleeding. Thrombosis history as above, last confirmed LLE DVT in December 2013.    IMMUNOLOGIC: No allergies or frequent infections.    NEUROLOGIC: No numbness, tingling, seizures or weakness.    PSYCHIATRIC: No anxiety or depression.      PHYSICAL EXAMINATION    /87  Pulse 90  Temp 98 °F (36.7 °C) (Oral)   Resp 18  Wt (!) 327 lb 8 oz (149 kg)  SpO2 97%  BMI 46.99 kg/m2    GENERAL:  A well-developed, well-nourished, morbidly obese, white male in no acute distress.  HEENT:  Pupils equally round and reactive to light.  Extraocular muscles intact.  Enlarged, firm, right-sided parotid mass, very slowly, but steadily, increasing in size compared to late 2015.  CARDIOVASCULAR:  Regular rate and rhythm.  No murmurs, gallops or rubs.  LUNGS:  Clear to auscultation bilaterally.  ABDOMEN:  Soft, nontender, nondistended with positive bowel sounds.  EXTREMITIES:  No clubbing, cyanosis or edema bilaterally.  SKIN:  No rashes or petechiae.  NEURO:  Cranial nerves grossly intact.  No focal deficits.  PSYCH:  Alert and oriented x3.    LABORATORY    Lab Results   Component Value Date    WBC 7.37 02/08/2017    HGB 15.1 02/08/2017    HCT 45.0 02/08/2017    MCV 84.9 02/08/2017     02/08/2017    NEUTROABS  5.14 2017       Lab Results   Component Value Date     2017    K 3.9 2017     2017    CO2 29.8 2017    BUN 12 2017    CREATININE 0.80 2017    GLUCOSE 181 (H) 2017    CALCIUM 9.1 2017    AST 78 (H) 2017    ALT 98 (H) 2017    ALKPHOS 103 2017    BILITOT 0.4 2017    PROTEINTOT 7.0 2017    ALBUMIN 3.90 2017     Hypercoagulable studies (2014):    Factor V Leiden mutation: Negative; Factor II, DNA analysis (Prothrombin [id number] mutation): Negative    Antithrombin activity: 98%; Antithrombin antigen: 86%    Protein C antigen: 100%; Protein C-functional: 149%; Activated Protein C resistance: 3.0    Protein S total: 110%; Protein S free: 83%; Protein S-functional: 115%      Beta-2 glycoprotein I Ab, IgG: < 9; IgA: < 9; IgM: < 9    Anticardiolipin Ab, IgG: < 9; IgA: < 9; IgM: < 9    Antiphosphatidylserine Ig; IgA: <1; IgM: 6      Lupus anticoagulant Reflex: PTT-LA: 49.6; dRVVT: 68.8; dRVVT Mix: 50.2; dRVVT confirm: 1.2    (No lupus anticoagulant detected)      IMAGING  Duplex scan venous left lower extremity (2013):    Impression: Exam positive for DVT in the left popliteal and peroneal veins.      Duplex scan venous left lower extremity (2014):    Impression: Residual deep vein thrombosis in the left peroneal vein but the popliteal vein does not show deep vein thrombosis at this time.      CT neck soft tissue with contrast (2015):    Impression: 2.6 cm mass in the right parotid gland.    NM PET with fusion CT, skull base to mid-thigh (2015, at ):    Impression: Intensely hypermetabolic mass centered within the deep lobe of the right parotid gland without evidence of abnormal FDG hypermetabolism elsewhere.      NM PET with fusion CT, skull base to mid-thigh (2015):  Impression: Intense hypermetabolic activity within the right masseter muscle. There is no hypermetabolic activity within  the right parotid mass. Physiologic hypermetabolism only elsewhere.    CT soft tissue neck with contrast (08/18/2016):  Impression: 3.3 cm well-circumscribed right parotid gland mass.    CT chest with contrast (08/18/2016):  Impression: Stable evaluation of the chest.    NM PET with fusion CT, skull base to mid-thigh (05/05/2017, compared to PET from 11/2015 and CT scan of the soft tissues of the neck from 08/2016):  Impression: 3.3 cm well-circumscribed rounded mass in the right parotid gland. It is hypermetabolic with SUV value reading of 6.3. It is not changed in size. No other abnormalities were demonstrated.    PATHOLOGY  Parotid gland, right, mass, superficial FNA (05/18/2015):    Malignant spindled and epithelioid neoplasm.      Parotid gland, right, mass, superficial FNA ( 12/01/2015):    Malignant spindled and epithelioid neoplasm.     IMPRESSION AND PLAN  Mr. Munoz is a 47 y.o., white male with:  1. Parotid gland mass: Biopsied in both May and December 2015, with, both times, a diagnosis of a non-specific malignancy. A PET scan in June 2015 at the [facility] (summarized above) showed no evidence of hypermetabolic activity anywhere outside the parotid gland; and, a repeat PET scan about five months later, showed no evidence of either progression or metastatic spread. The parotid mass itself was not hypermetabolic at that time, while the cheek around it (likely due to inflammation) was the only area that was (hypermetabolic). I have had multiple long discussions with the patient and his wife in clinic over the past ~ two and one half (plus) years regarding these findings. While a specific diagnosis has eluded us, the good news has been that this has been a very benign tumor (the most recent repeat imaging, CTs from mid-August 2016 and a followup PET scan on 05/05/2017, all summarized above, continue to show, at worst, the extremely slow growth of the parotid tumor with no evidence of disease  elsewhere). Therefore, excisional surgery continues to be the mainstay of both diagnosis and therapy (which has always been, and still is, recommended, as this tumor will undoubtedly only continue to grow and cause progressive, localized symptoms, even if it takes a very long time to do so), as there are no chemotherapeutic and/or radiotherapeutic options for such a slowly growing tumor. Unfortunately, given the mass' location within the parotid gland (and consequently, its involvement of fragile structures such as the facial nerve), this surgery would likely be relatively complicated. In fall 2016, he was finally agreeable to being referred to an academic center for an additional surgical opinion. [facility] has agreed to perform the resection, but the patient remains reluctant to actually go through with the procedure. His wife reports today that she thinks she that she has finally convinced him (the patient). They plan to call [facility] back this afternoon to arrange followup. We will see him back in our clinic in six months or sooner, as indicated, depending on the results of the surgery (assuming he follows through with it).  2. DVT: The patient has experienced two other acute events besides the documented lower extremity DVT in December 2013 (a PE at the time of his injury in 1998 and a DVT in his injured, right upper arm around 2008 or 2009). Regardless, the patient's history of severe traumatic injury has resulted in permanent damage to his right arm as well as to occasional periods of relative immobility (due to chronic, and at times worsening, back and referred nerve damage and pain). These are significant, chronic factors that have, and will continue to, increase his risk of thrombosis. Additionally, the results of the hypercoagulable studies sent in March 2014 (which are summarized above), while largely unremarkable, do show the possible presence of an antiphosphatidylserine antibody (the dRVVT  results are consistent with, and likely due to, taking Xarelto; the other studies were within normal limits). He completed a total of six months of treatment by summer 2015; but, ultimately, his combination of lab results and clinical history led us to recommend indefinite anticoagulation. However, the patient discontinued the Xarelto in mid-2014 on his own. He still has not had any issues with recurrent thrombotic events since then. Continue to monitor.    The patient and his wife were in agreement with these plans.      It is a pleasure to participate in Mr. Munoz's care. Please do not hesitate to call with any questions or concerns that you may have.    A total of 30 minutes were spent coordinating this patient’s care in clinic today; 25 minutes of which were face-to-face with the patient, reviewing his interim medical history and counseling on the current treatment recommendations and followup plan. All questions were answered to their satisfaction.    FOLLOW UP  With [facility] for surgical resection of the right-sided parotid mass as soon as possible (once the patient is agreeable). Return to our clinic in 6 months (or shortly after surgery, as indicated).      This document was electronically signed by HUE Bonilla MD November 29, 2017 11:31 AM      CC: MD Sherman Walker MD

## 2018-02-14 ENCOUNTER — HOSPITAL ENCOUNTER (EMERGENCY)
Facility: HOSPITAL | Age: 48
Discharge: HOME | End: 2018-02-14
Attending: EMERGENCY MEDICINE | Admitting: EMERGENCY MEDICINE

## 2018-02-14 ENCOUNTER — APPOINTMENT (OUTPATIENT)
Dept: CT IMAGING | Facility: HOSPITAL | Age: 48
End: 2018-02-14

## 2018-02-14 VITALS
HEART RATE: 81 BPM | RESPIRATION RATE: 17 BRPM | HEIGHT: 70 IN | SYSTOLIC BLOOD PRESSURE: 146 MMHG | TEMPERATURE: 97.8 F | BODY MASS INDEX: 45.1 KG/M2 | OXYGEN SATURATION: 99 % | WEIGHT: 315 LBS | DIASTOLIC BLOOD PRESSURE: 87 MMHG

## 2018-02-14 DIAGNOSIS — N20.1 LEFT URETERAL STONE: Primary | ICD-10-CM

## 2018-02-14 LAB
ANION GAP SERPL CALCULATED.3IONS-SCNC: 6.5 MMOL/L (ref 3.6–11.2)
BASOPHILS # BLD AUTO: 0.02 10*3/MM3 (ref 0–0.3)
BASOPHILS NFR BLD AUTO: 0.2 % (ref 0–2)
BUN BLD-MCNC: 9 MG/DL (ref 7–21)
BUN/CREAT SERPL: 10.5 (ref 7–25)
CALCIUM SPEC-SCNC: 9 MG/DL (ref 7.7–10)
CHLORIDE SERPL-SCNC: 100 MMOL/L (ref 99–112)
CO2 SERPL-SCNC: 26.5 MMOL/L (ref 24.3–31.9)
CREAT BLD-MCNC: 0.86 MG/DL (ref 0.43–1.29)
DEPRECATED RDW RBC AUTO: 39.2 FL (ref 37–54)
EOSINOPHIL # BLD AUTO: 0.22 10*3/MM3 (ref 0–0.7)
EOSINOPHIL NFR BLD AUTO: 2.4 % (ref 0–5)
ERYTHROCYTE [DISTWIDTH] IN BLOOD BY AUTOMATED COUNT: 13.1 % (ref 11.5–14.5)
GFR SERPL CREATININE-BSD FRML MDRD: 96 ML/MIN/1.73
GLUCOSE BLD-MCNC: 203 MG/DL (ref 70–110)
HCT VFR BLD AUTO: 46.4 % (ref 42–52)
HGB BLD-MCNC: 16 G/DL (ref 14–18)
IMM GRANULOCYTES # BLD: 0.03 10*3/MM3 (ref 0–0.03)
IMM GRANULOCYTES NFR BLD: 0.3 % (ref 0–0.5)
LYMPHOCYTES # BLD AUTO: 3 10*3/MM3 (ref 1–3)
LYMPHOCYTES NFR BLD AUTO: 32.8 % (ref 21–51)
MCH RBC QN AUTO: 28.6 PG (ref 27–33)
MCHC RBC AUTO-ENTMCNC: 34.5 G/DL (ref 33–37)
MCV RBC AUTO: 83 FL (ref 80–94)
MONOCYTES # BLD AUTO: 0.68 10*3/MM3 (ref 0.1–0.9)
MONOCYTES NFR BLD AUTO: 7.4 % (ref 0–10)
NEUTROPHILS # BLD AUTO: 5.21 10*3/MM3 (ref 1.4–6.5)
NEUTROPHILS NFR BLD AUTO: 56.9 % (ref 30–70)
OSMOLALITY SERPL CALC.SUM OF ELEC: 270.9 MOSM/KG (ref 273–305)
PLATELET # BLD AUTO: 207 10*3/MM3 (ref 130–400)
PMV BLD AUTO: 9.9 FL (ref 6–10)
POTASSIUM BLD-SCNC: 6.3 MMOL/L (ref 3.5–5.3)
RBC # BLD AUTO: 5.59 10*6/MM3 (ref 4.7–6.1)
SODIUM BLD-SCNC: 133 MMOL/L (ref 135–153)
WBC NRBC COR # BLD: 9.16 10*3/MM3 (ref 4.5–12.5)

## 2018-02-14 PROCEDURE — 74176 CT ABD & PELVIS W/O CONTRAST: CPT | Performed by: RADIOLOGY

## 2018-02-14 PROCEDURE — 25010000002 KETOROLAC TROMETHAMINE PER 15 MG: Performed by: EMERGENCY MEDICINE

## 2018-02-14 PROCEDURE — 74176 CT ABD & PELVIS W/O CONTRAST: CPT

## 2018-02-14 PROCEDURE — 99283 EMERGENCY DEPT VISIT LOW MDM: CPT

## 2018-02-14 PROCEDURE — 25010000002 ONDANSETRON PER 1 MG: Performed by: EMERGENCY MEDICINE

## 2018-02-14 PROCEDURE — 96361 HYDRATE IV INFUSION ADD-ON: CPT

## 2018-02-14 PROCEDURE — 85025 COMPLETE CBC W/AUTO DIFF WBC: CPT | Performed by: EMERGENCY MEDICINE

## 2018-02-14 PROCEDURE — 80048 BASIC METABOLIC PNL TOTAL CA: CPT | Performed by: EMERGENCY MEDICINE

## 2018-02-14 PROCEDURE — 96375 TX/PRO/DX INJ NEW DRUG ADDON: CPT

## 2018-02-14 PROCEDURE — 96374 THER/PROPH/DIAG INJ IV PUSH: CPT

## 2018-02-14 RX ORDER — SODIUM CHLORIDE 0.9 % (FLUSH) 0.9 %
10 SYRINGE (ML) INJECTION AS NEEDED
Status: DISCONTINUED | OUTPATIENT
Start: 2018-02-14 | End: 2018-02-14 | Stop reason: HOSPADM

## 2018-02-14 RX ORDER — TAMSULOSIN HYDROCHLORIDE 0.4 MG/1
1 CAPSULE ORAL DAILY
Qty: 7 CAPSULE | Refills: 0 | Status: SHIPPED | OUTPATIENT
Start: 2018-02-14 | End: 2018-10-19

## 2018-02-14 RX ORDER — KETOROLAC TROMETHAMINE 30 MG/ML
30 INJECTION, SOLUTION INTRAMUSCULAR; INTRAVENOUS ONCE
Status: COMPLETED | OUTPATIENT
Start: 2018-02-14 | End: 2018-02-14

## 2018-02-14 RX ORDER — HYDROCODONE BITARTRATE AND ACETAMINOPHEN 7.5; 325 MG/1; MG/1
1 TABLET ORAL EVERY 6 HOURS PRN
Qty: 12 TABLET | Refills: 0 | Status: SHIPPED | OUTPATIENT
Start: 2018-02-14 | End: 2018-10-19

## 2018-02-14 RX ORDER — ONDANSETRON 2 MG/ML
4 INJECTION INTRAMUSCULAR; INTRAVENOUS ONCE
Status: COMPLETED | OUTPATIENT
Start: 2018-02-14 | End: 2018-02-14

## 2018-02-14 RX ADMIN — SODIUM CHLORIDE 1000 ML: 9 INJECTION, SOLUTION INTRAVENOUS at 05:54

## 2018-02-14 RX ADMIN — ONDANSETRON 4 MG: 2 INJECTION, SOLUTION INTRAMUSCULAR; INTRAVENOUS at 05:54

## 2018-02-14 RX ADMIN — KETOROLAC TROMETHAMINE 30 MG: 30 INJECTION, SOLUTION INTRAMUSCULAR; INTRAVENOUS at 05:56

## 2018-02-14 NOTE — DISCHARGE INSTRUCTIONS
"    Hypertension  Hypertension, commonly called high blood pressure, is when the force of blood pumping through the arteries is too strong. The arteries are the blood vessels that carry blood from the heart throughout the body. Hypertension forces the heart to work harder to pump blood and may cause arteries to become narrow or stiff. Having untreated or uncontrolled hypertension can cause heart attacks, strokes, kidney disease, and other problems.  A blood pressure reading consists of a higher number over a lower number. Ideally, your blood pressure should be below 120/80. The first (\"top\") number is called the systolic pressure. It is a measure of the pressure in your arteries as your heart beats. The second (\"bottom\") number is called the diastolic pressure. It is a measure of the pressure in your arteries as the heart relaxes.  What are the causes?  The cause of this condition is not known.  What increases the risk?  Some risk factors for high blood pressure are under your control. Others are not.  Factors you can change   · Smoking.  · Having type 2 diabetes mellitus, high cholesterol, or both.  · Not getting enough exercise or physical activity.  · Being overweight.  · Having too much fat, sugar, calories, or salt (sodium) in your diet.  · Drinking too much alcohol.  Factors that are difficult or impossible to change   · Having chronic kidney disease.  · Having a family history of high blood pressure.  · Age. Risk increases with age.  · Race. You may be at higher risk if you are -American.  · Gender. Men are at higher risk than women before age 45. After age 65, women are at higher risk than men.  · Having obstructive sleep apnea.  · Stress.  What are the signs or symptoms?  Extremely high blood pressure (hypertensive crisis) may cause:  · Headache.  · Anxiety.  · Shortness of breath.  · Nosebleed.  · Nausea and vomiting.  · Severe chest pain.  · Jerky movements you cannot control (seizures).  How is " this diagnosed?  This condition is diagnosed by measuring your blood pressure while you are seated, with your arm resting on a surface. The cuff of the blood pressure monitor will be placed directly against the skin of your upper arm at the level of your heart. It should be measured at least twice using the same arm. Certain conditions can cause a difference in blood pressure between your right and left arms.  Certain factors can cause blood pressure readings to be lower or higher than normal (elevated) for a short period of time:  · When your blood pressure is higher when you are in a health care provider's office than when you are at home, this is called white coat hypertension. Most people with this condition do not need medicines.  · When your blood pressure is higher at home than when you are in a health care provider's office, this is called masked hypertension. Most people with this condition may need medicines to control blood pressure.  If you have a high blood pressure reading during one visit or you have normal blood pressure with other risk factors:  · You may be asked to return on a different day to have your blood pressure checked again.  · You may be asked to monitor your blood pressure at home for 1 week or longer.  If you are diagnosed with hypertension, you may have other blood or imaging tests to help your health care provider understand your overall risk for other conditions.  How is this treated?  This condition is treated by making healthy lifestyle changes, such as eating healthy foods, exercising more, and reducing your alcohol intake. Your health care provider may prescribe medicine if lifestyle changes are not enough to get your blood pressure under control, and if:  · Your systolic blood pressure is above 130.  · Your diastolic blood pressure is above 80.  Your personal target blood pressure may vary depending on your medical conditions, your age, and other factors.  Follow these  instructions at home:  Eating and drinking   · Eat a diet that is high in fiber and potassium, and low in sodium, added sugar, and fat. An example eating plan is called the DASH (Dietary Approaches to Stop Hypertension) diet. To eat this way:  ¨ Eat plenty of fresh fruits and vegetables. Try to fill half of your plate at each meal with fruits and vegetables.  ¨ Eat whole grains, such as whole wheat pasta, brown rice, or whole grain bread. Fill about one quarter of your plate with whole grains.  ¨ Eat or drink low-fat dairy products, such as skim milk or low-fat yogurt.  ¨ Avoid fatty cuts of meat, processed or cured meats, and poultry with skin. Fill about one quarter of your plate with lean proteins, such as fish, chicken without skin, beans, eggs, and tofu.  ¨ Avoid premade and processed foods. These tend to be higher in sodium, added sugar, and fat.  · Reduce your daily sodium intake. Most people with hypertension should eat less than 1,500 mg of sodium a day.  · Limit alcohol intake to no more than 1 drink a day for nonpregnant women and 2 drinks a day for men. One drink equals 12 oz of beer, 5 oz of wine, or 1½ oz of hard liquor.  Lifestyle   · Work with your health care provider to maintain a healthy body weight or to lose weight. Ask what an ideal weight is for you.  · Get at least 30 minutes of exercise that causes your heart to beat faster (aerobic exercise) most days of the week. Activities may include walking, swimming, or biking.  · Include exercise to strengthen your muscles (resistance exercise), such as pilates or lifting weights, as part of your weekly exercise routine. Try to do these types of exercises for 30 minutes at least 3 days a week.  · Do not use any products that contain nicotine or tobacco, such as cigarettes and e-cigarettes. If you need help quitting, ask your health care provider.  · Monitor your blood pressure at home as told by your health care provider.  · Keep all follow-up visits  as told by your health care provider. This is important.  Medicines   · Take over-the-counter and prescription medicines only as told by your health care provider. Follow directions carefully. Blood pressure medicines must be taken as prescribed.  · Do not skip doses of blood pressure medicine. Doing this puts you at risk for problems and can make the medicine less effective.  · Ask your health care provider about side effects or reactions to medicines that you should watch for.  Contact a health care provider if:  · You think you are having a reaction to a medicine you are taking.  · You have headaches that keep coming back (recurring).  · You feel dizzy.  · You have swelling in your ankles.  · You have trouble with your vision.  Get help right away if:  · You develop a severe headache or confusion.  · You have unusual weakness or numbness.  · You feel faint.  · You have severe pain in your chest or abdomen.  · You vomit repeatedly.  · You have trouble breathing.  Summary  · Hypertension is when the force of blood pumping through your arteries is too strong. If this condition is not controlled, it may put you at risk for serious complications.  · Your personal target blood pressure may vary depending on your medical conditions, your age, and other factors. For most people, a normal blood pressure is less than 120/80.  · Hypertension is treated with lifestyle changes, medicines, or a combination of both. Lifestyle changes include weight loss, eating a healthy, low-sodium diet, exercising more, and limiting alcohol.  This information is not intended to replace advice given to you by your health care provider. Make sure you discuss any questions you have with your health care provider.  Document Released: 12/18/2006 Document Revised: 11/15/2017 Document Reviewed: 11/15/2017  [employer] Interactive Patient Education © 2017 [employer]

## 2018-02-14 NOTE — ED NOTES
Patient states that he is having flank pain at this time. States that he has been having it with increasing pain in left kidney. NADN. WCTM. No complaints of nausea or vomiting noted at this time. States that he woke up at 0440 this morning with the flank pain     Mercedes Bennett RN  02/14/18 0536       Mercedes Bennett RN  02/14/18 0601

## 2018-02-14 NOTE — ED PROVIDER NOTES
Subjective   HPI Comments: Awakened from sleep with left flank pain feels like a kidney stone hasn't had one in 5 years    Patient is a 47 y.o. male presenting with flank pain.   History provided by:  Patient   used: No    Flank Pain   Pain location:  L flank  Pain quality: aching, sharp and shooting    Pain radiates to:  LLQ  Pain severity:  Mild  Onset quality:  Sudden  Duration:  1 hour  Timing:  Constant  Progression:  Unchanged  Chronicity:  New  Context: awakening from sleep    Relieved by:  Nothing  Worsened by:  Movement  Ineffective treatments:  None tried  Associated symptoms: nausea    Associated symptoms: no chest pain, no dysuria and no fever    Risk factors: obesity        Review of Systems   Constitutional: Negative.  Negative for fever.   HENT: Negative.    Respiratory: Negative.    Cardiovascular: Negative.  Negative for chest pain.   Gastrointestinal: Positive for nausea. Negative for abdominal pain.   Endocrine: Negative.    Genitourinary: Positive for flank pain. Negative for dysuria.   Skin: Negative.    Neurological: Negative.    Psychiatric/Behavioral: Negative.    All other systems reviewed and are negative.      Past Medical History:   Diagnosis Date   • Cancer    • Degenerative joint disease    • Diabetes mellitus    • DVT, lower extremity     left 2013   • Hypertension    • Peroneal DVT (deep venous thrombosis)     in 1998 and dvt 2010   • Spinal stenosis    • Urolithiasis        No Known Allergies    Past Surgical History:   Procedure Laterality Date   • COLONOSCOPY     • CYSTOSCOPY W/ LITHOLAPAXY / E[facility]     • INNER EAR SURGERY         Family History   Problem Relation Age of Onset   • Other Other      deveral siblings havd had early onset DVTs       Social History     Social History   • Marital status:      Spouse name: N/A   • Number of children: N/A   • Years of education: N/A     Social History Main Topics   • Smoking status: Former Smoker     Years: 20.00    • Smokeless tobacco: Current User     Types: Chew      Comment: occasionally uses chew once a year    • Alcohol use No      Comment: beer occasionally   • Drug use: No   • Sexual activity: Not Asked     Other Topics Concern   • None     Social History Narrative           Objective   Physical Exam   Constitutional: He is oriented to person, place, and time. He appears well-developed and well-nourished. No distress.   HENT:   Head: Normocephalic and atraumatic.   Right Ear: External ear normal.   Left Ear: External ear normal.   Nose: Nose normal.   Eyes: Conjunctivae and EOM are normal. Pupils are equal, round, and reactive to light.   Neck: Normal range of motion. Neck supple. No JVD present. No tracheal deviation present.   Cardiovascular: Normal rate, regular rhythm and normal heart sounds.    No murmur heard.  Pulmonary/Chest: Effort normal and breath sounds normal. No respiratory distress. He has no wheezes.   Abdominal: Soft. Bowel sounds are normal. There is no tenderness.   Musculoskeletal: Normal range of motion. He exhibits no edema or deformity.   Neurological: He is alert and oriented to person, place, and time. No cranial nerve deficit.   Skin: Skin is warm and dry. No rash noted. He is not diaphoretic. No erythema. No pallor.   Psychiatric: He has a normal mood and affect. His behavior is normal. Thought content normal.   Nursing note and vitals reviewed.      Procedures        CT Abdomen Pelvis Stone Protocol   ED Interpretation   2 mm stone left proximal ureter with mild hydronephrosis per   radiology            ED Course  ED Course        Pain improved with Toradol          MDM    Final diagnoses:   Left ureteral stone            Nic Lima MD  02/14/18 0649

## 2018-02-14 NOTE — ED NOTES
Asked patient if he was able to provide a urine specimen at this time. States that he just urinated and that he would try again in a little while. Urine cup placed on bedside.      Mercedes Bennett RN  02/14/18 0602

## 2018-02-14 NOTE — ED NOTES
MD at bedside speaking to patient at this time related to CT scan results. RONA. WCTM. Updated on POC. Resps even and unlabored. Denies any needs at this time. Watching television.      Mercedes Bennett RN  02/14/18 0736

## 2018-03-29 ENCOUNTER — HOSPITAL ENCOUNTER (EMERGENCY)
Facility: HOSPITAL | Age: 48
End: 2018-03-29

## 2018-08-14 ENCOUNTER — APPOINTMENT (OUTPATIENT)
Dept: GENERAL RADIOLOGY | Facility: HOSPITAL | Age: 48
End: 2018-08-14

## 2018-08-14 ENCOUNTER — HOSPITAL ENCOUNTER (EMERGENCY)
Facility: HOSPITAL | Age: 48
Discharge: HOME | End: 2018-08-15
Attending: EMERGENCY MEDICINE | Admitting: EMERGENCY MEDICINE

## 2018-08-14 DIAGNOSIS — S90.31XA CONTUSION OF RIGHT FOOT, INITIAL ENCOUNTER: Primary | ICD-10-CM

## 2018-08-14 PROCEDURE — 73630 X-RAY EXAM OF FOOT: CPT | Performed by: RADIOLOGY

## 2018-08-14 PROCEDURE — 99283 EMERGENCY DEPT VISIT LOW MDM: CPT

## 2018-08-14 PROCEDURE — 73630 X-RAY EXAM OF FOOT: CPT

## 2018-08-14 RX ORDER — IBUPROFEN 800 MG/1
800 TABLET ORAL EVERY 6 HOURS PRN
Qty: 30 TABLET | Refills: 0 | Status: SHIPPED | OUTPATIENT
Start: 2018-08-14 | End: 2018-10-19

## 2018-08-14 RX ORDER — ORPHENADRINE CITRATE 100 MG/1
100 TABLET, EXTENDED RELEASE ORAL 2 TIMES DAILY PRN
Qty: 20 TABLET | Refills: 0 | Status: SHIPPED | OUTPATIENT
Start: 2018-08-14 | End: 2018-10-19

## 2018-08-15 VITALS
HEIGHT: 70 IN | SYSTOLIC BLOOD PRESSURE: 168 MMHG | OXYGEN SATURATION: 97 % | HEART RATE: 90 BPM | BODY MASS INDEX: 42.37 KG/M2 | WEIGHT: 296 LBS | RESPIRATION RATE: 20 BRPM | DIASTOLIC BLOOD PRESSURE: 80 MMHG | TEMPERATURE: 98.8 F

## 2018-08-15 NOTE — ED NOTES
Entered patient room at this time to place ace bandage on patient right foot. States that he does not need it at this time, the pain is controlled. Made PA aware who states that patient is able to take it home with him and use as needed. NADN. WCTM. Resps even and unlabored. Call light within reach.      Mercedes Bennett, GENET  08/15/18 0003

## 2018-08-15 NOTE — ED PROVIDER NOTES
Subjective     History provided by:  Patient  Lower Extremity Issue   Location:  Foot  Time since incident:  1 hour  Injury: yes    Mechanism of injury: crush    Crush:     Mechanism:  Falling object (65 lb vice. )  Foot location:  R foot  Pain details:     Quality:  Aching and throbbing    Radiates to:  Does not radiate    Severity:  Mild    Onset quality:  Sudden    Timing:  Constant    Progression:  Worsening  Chronicity:  New  Relieved by:  Nothing  Ineffective treatments:  None tried  Associated symptoms: stiffness and swelling    Associated symptoms: no fever        Review of Systems   Constitutional: Negative.  Negative for fever.   HENT: Negative.    Respiratory: Negative.    Cardiovascular: Negative.  Negative for chest pain.   Gastrointestinal: Negative.  Negative for abdominal pain.   Endocrine: Negative.    Genitourinary: Negative.  Negative for dysuria.   Musculoskeletal: Positive for arthralgias and stiffness.   Skin: Negative.    Neurological: Negative.    Psychiatric/Behavioral: Negative.    All other systems reviewed and are negative.      Past Medical History:   Diagnosis Date   • Cancer (CMS/[facility])    • Degenerative joint disease    • Diabetes mellitus (CMS/[facility])    • DVT, lower extremity (CMS/[facility])     left 2013   • Hypertension    • Peroneal DVT (deep venous thrombosis) (CMS/[facility])     in 1998 and dvt 2010   • Spinal stenosis    • Urolithiasis        No Known Allergies    Past Surgical History:   Procedure Laterality Date   • COLONOSCOPY     • CYSTOSCOPY W/ LITHOLAPAXY / EHL     • INNER EAR SURGERY         Family History   Problem Relation Age of Onset   • Other Other         deveral siblings havd had early onset DVTs       Social History     Social History   • Marital status:      Social History Main Topics   • Smoking status: Former Smoker     Years: 20.00   • Smokeless tobacco: Current User     Types: Chew      Comment: occasionally uses chew once a year    • Alcohol use No      Comment:  beer occasionally   • Drug use: No     Other Topics Concern   • Not on file           Objective   Physical Exam   Constitutional: He is oriented to person, place, and time. He appears well-developed and well-nourished. No distress.   HENT:   Head: Normocephalic and atraumatic.   Right Ear: External ear normal.   Left Ear: External ear normal.   Nose: Nose normal.   Eyes: Conjunctivae are normal.   Neck: Normal range of motion. Neck supple. No JVD present. No tracheal deviation present.   Cardiovascular: Normal rate.    No murmur heard.  Pulmonary/Chest: Effort normal. No respiratory distress. He has no wheezes.   Abdominal: Soft. There is no tenderness.   Musculoskeletal: He exhibits edema and tenderness. He exhibits no deformity.   Edema noted to the dorsum of the right foot.  Mild ecchymosis to the right foot.   Neurological: He is alert and oriented to person, place, and time. No cranial nerve deficit.   Skin: Skin is warm and dry. No rash noted. He is not diaphoretic. No erythema. No pallor.   Psychiatric: He has a normal mood and affect. His behavior is normal. Thought content normal.   Nursing note and vitals reviewed.      Procedures           ED Course  ED Course as of Aug 14 2351   Tue Aug 14, 2018   2347 X-ray interpreted by Dr. Flores: No apparent acute bony abnormality.  [RB]      ED Course User Index  [RB] Alexys Madrid PA                  MDM  Number of Diagnoses or Management Options  Contusion of right foot, initial encounter: new and requires workup     Amount and/or Complexity of Data Reviewed  Tests in the radiology section of CPT®: reviewed  Discuss the patient with other providers: yes    Risk of Complications, Morbidity, and/or Mortality  Presenting problems: low  Diagnostic procedures: low  Management options: low    Patient Progress  Patient progress: stable        Final diagnoses:   Contusion of right foot, initial encounter            Alexys Madrid PA  08/14/18 5551

## 2018-08-15 NOTE — ED NOTES
Patient states that earlier today he dropped something on his right foot, states that ever since then, it has hurt and his foot has been sore. NADN. WCTM. Resps even and unlabored. Updated on POC. Call light and fall precautions in place.      Mercedes Bennett RN  08/14/18 0919

## 2018-10-19 ENCOUNTER — OFFICE VISIT (OUTPATIENT)
Dept: ORTHOPEDIC SURGERY | Facility: CLINIC | Age: 48
End: 2018-10-19

## 2018-10-19 ENCOUNTER — HOSPITAL ENCOUNTER (OUTPATIENT)
Dept: GENERAL RADIOLOGY | Facility: HOSPITAL | Age: 48
Discharge: HOME | End: 2018-10-19
Attending: ORTHOPAEDIC SURGERY | Admitting: ORTHOPAEDIC SURGERY

## 2018-10-19 VITALS — HEIGHT: 70 IN | BODY MASS INDEX: 42.55 KG/M2 | WEIGHT: 297.2 LBS

## 2018-10-19 DIAGNOSIS — R20.2 PARESTHESIAS: Primary | ICD-10-CM

## 2018-10-19 DIAGNOSIS — M19.012 OSTEOARTHRITIS OF AC (ACROMIOCLAVICULAR) JOINTS, BILATERAL: ICD-10-CM

## 2018-10-19 DIAGNOSIS — M25.512 LEFT SHOULDER PAIN, UNSPECIFIED CHRONICITY: Primary | ICD-10-CM

## 2018-10-19 DIAGNOSIS — M19.011 OSTEOARTHRITIS OF AC (ACROMIOCLAVICULAR) JOINTS, BILATERAL: ICD-10-CM

## 2018-10-19 PROCEDURE — 99213 OFFICE O/P EST LOW 20 MIN: CPT | Performed by: ORTHOPAEDIC SURGERY

## 2018-10-19 PROCEDURE — 73030 X-RAY EXAM OF SHOULDER: CPT

## 2018-10-19 PROCEDURE — 73030 X-RAY EXAM OF SHOULDER: CPT | Performed by: RADIOLOGY

## 2018-10-19 PROCEDURE — 20605 DRAIN/INJ JOINT/BURSA W/O US: CPT | Performed by: ORTHOPAEDIC SURGERY

## 2018-10-19 RX ORDER — OXYCODONE AND ACETAMINOPHEN 10; 325 MG/1; MG/1
1 TABLET ORAL EVERY 6 HOURS PRN
COMMUNITY

## 2018-10-19 RX ORDER — BLOOD-GLUCOSE METER
KIT MISCELLANEOUS
COMMUNITY
Start: 2018-10-11 | End: 2019-03-20

## 2018-10-19 RX ADMIN — METHYLPREDNISOLONE ACETATE 40 MG: 40 INJECTION, SUSPENSION INTRA-ARTICULAR; INTRALESIONAL; INTRAMUSCULAR; SOFT TISSUE at 10:02

## 2018-10-19 RX ADMIN — BUPIVACAINE HYDROCHLORIDE 3 ML: 5 INJECTION, SOLUTION EPIDURAL; INTRACAUDAL at 10:02

## 2018-10-19 NOTE — PROGRESS NOTES
"Alek Munoz   :1970    Date of encounter:10/19/2018    Chief Complaint   Patient presents with   • Left Shoulder - Pain       HPI:  Alek Munoz is a 48 y.o. male who presents here today with complaints of bilateral shoulder pain.  We've seen him in the past for acromioclavicular joint of the right shoulder.  He last received intra-articular steroid injection on 2017.  He states he's done really well with his last injection knees have some mild soreness return in about 2 months ago but nothing that is not tolerable.  His biggest complaint is of similar pain now in his left shoulder.  He states this started about 2 months ago.  He states he's also developed numbness and tingling in the bilateral hands left is worse than the right.  He states that on the left hand symptoms are constant and they come and go in the right hand.  He states it's worse at night and will wake him up.  He also has worsening symptoms with repetitive activities.  He states the numbness and tingling is worse in the third through the fifth digits but is also present at a lesser extent in the thumb and index finger.  He denies any trauma or significant neck pain.    PMH:   Patient Active Problem List   Diagnosis   • Benign neoplasm parotid gland   • Osteoarthritis of acromioclavicular joint   • Hypertension   • Diabetes mellitus (CMS/HCC)   • Gastroesophageal reflux disease without esophagitis       Exam:  General Appearance:    48 y.o. male  cooperative, in no acute distress.  Alert and oriented x 3,                   Vitals:    10/19/18 0928   Weight: 135 kg (297 lb 3.2 oz)   Height: 177.8 cm (70\")          Body mass index is 42.64 kg/m².     examination the left shoulder reveals tenderness along the acromioclavicular joint.  He has full forward flexion and abduction.  He has mild pain with crossarm adduction.  He has good strength with stressing of the supraspinatus tendon and with external rotation.    Examination the " bilateral hands reveals no thenar atrophy.  He has good  strength and pinch or grasp.  As well as good strength with finger abduction.  He does have decreased sensation in the third through fifth fingers.  He has positive Phalen's and Tinel sign.    Radiology:    Views of the left shoulder were reviewed revealing mild arthritic changes within the acromioclavicular joint.    Medium Joint Arthrocentesis  Date/Time: 10/19/2018 10:02 AM  Consent given by: patient  Site marked: site marked  Timeout: Immediately prior to procedure a time out was called to verify the correct patient, procedure, equipment, support staff and site/side marked as required   Supporting Documentation  Indications: pain   Procedure Details  Location: shoulder - L acromioclavicular  Needle size: 25 G  Approach: superior  Medications administered: 3 mL bupivacaine (PF) 0.5 %; 40 mg methylPREDNISolone acetate 40 MG/ML  Patient tolerance: patient tolerated the procedure well with no immediate complications          Assessment    ICD-10-CM ICD-9-CM   1. Paresthesias R20.2 782.0   2. Osteoarthritis of AC (acromioclavicular) joints, bilateral M19.011 715.91    M19.012        Plan:   48-year-old male with bilateral acromioclavicular joint arthritis.  His right shoulder is doing well currently with only minimal pain.  He does have moderate tenderness along the acromial clavicular joint of the left shoulder with evidence of arthritic changes on x-rays.  Given this today we proceeded with 40 mg of Depo-Medrol with Marcaine block intra-articularly into the acromioclavicular joint of the left shoulder. He tolerated this well.  We will monitors response the injection.  In addition of this we will order EMG and nerve conduction study to evaluate for the bilateral hand paresthesias.  He will return back once this has been done to discuss results.    Written by, Shea POWELL, acting as a scribe for Dr. Bar                  Patient's Body mass index  is 42.64 kg/m². BMI is above normal parameters. Recommendations include: educational material.

## 2018-10-20 RX ORDER — BUPIVACAINE HYDROCHLORIDE 5 MG/ML
3 INJECTION, SOLUTION EPIDURAL; INTRACAUDAL
Status: COMPLETED | OUTPATIENT
Start: 2018-10-19 | End: 2018-10-19

## 2018-10-20 RX ORDER — METHYLPREDNISOLONE ACETATE 40 MG/ML
40 INJECTION, SUSPENSION INTRA-ARTICULAR; INTRALESIONAL; INTRAMUSCULAR; SOFT TISSUE
Status: COMPLETED | OUTPATIENT
Start: 2018-10-19 | End: 2018-10-19

## 2018-11-13 ENCOUNTER — OFFICE VISIT (OUTPATIENT)
Dept: ONCOLOGY | Facility: CLINIC | Age: 48
End: 2018-11-13

## 2018-11-13 VITALS
OXYGEN SATURATION: 98 % | DIASTOLIC BLOOD PRESSURE: 87 MMHG | TEMPERATURE: 97.3 F | RESPIRATION RATE: 20 BRPM | BODY MASS INDEX: 42.18 KG/M2 | HEART RATE: 89 BPM | SYSTOLIC BLOOD PRESSURE: 141 MMHG | WEIGHT: 294 LBS

## 2018-11-13 DIAGNOSIS — D11.0 BENIGN NEOPLASM PAROTID GLAND: Primary | ICD-10-CM

## 2018-11-13 PROCEDURE — 99214 OFFICE O/P EST MOD 30 MIN: CPT | Performed by: INTERNAL MEDICINE

## 2018-11-13 RX ORDER — INSULIN GLARGINE 100 [IU]/ML
INJECTION, SOLUTION SUBCUTANEOUS
COMMUNITY
End: 2020-01-28 | Stop reason: CLARIF

## 2018-11-13 RX ORDER — PSEUDOEPHEDRINE HCL 30 MG
30 TABLET ORAL EVERY 4 HOURS PRN
COMMUNITY
End: 2019-03-20

## 2018-11-13 NOTE — PROGRESS NOTES
Name:  Alek Munoz  :  1970  Date:  2018     PRIMARY CARE PHYSICIAN  Arnol Torres MD    REASON FOR FOLLOWUP  1. Benign neoplasm parotid gland      CHIEF COMPLAINT  Right-sided parotid gland mass and chronic back pain, the former continuing to slowly, but steadily, enlarge and cause progressive symptoms.    Dear Dr. Argueta,    HISTORY OF PRESENT ILLNESS:   I saw Mr. Munoz in follow up today in our medical oncology clinic. As you are aware, he is a pleasant, 48 y.o., white male who was diagnosed with a DVT in early 2013 after presenting to the [facility] ED with a two day history of worsening left leg pain and swelling. He was briefly hospitalized in order to start anticoagulation with Xarelto. I initially saw him during this hospitalization. At that time, he reported a history of severe traumatic injury in , when a truck he was working underneath rolled off a lift and landed on him. This accident resulted in multiple contusions and a displaced right upper extremity (for which he refused surgery). Apparently, both around that time (in ), and again in [id number] , he experienced a DVT. He also stated that multiple family members (including several siblings) have all had DVTs in the recent past (although he has been unable to provide any specifics). Of note, around the time of the most recent, documented DVT (in 2013), he had been much less mobile than usual for the previous few months as he had been experiencing worse problems with his chronic back and sciatic nerve pain (which have both been chronic issues si nce the truck incident). Regardless, with his personal and family history, as well as a positive antiphosphatidylserine antibody (identified on the hypercoagulable panel ordered in our office in 2014), indefinite anticoagulation (and he was tolerating Xarelto well) was recommended. He was subsequently lost to follow up (by 2014), but re-presented  "to our clinic in mid-November 2015 with a new issue: a persistent, right parotid gland mass. He first noticed this enlarging area around November/December 2014. 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 At the time of his re-presentation to our clinic, further evaluation with a new PET scan and FNA biopsy were ordered; and these confirmed a relatively benign tumor, with no noticeable progression during the previous ~ five to six months.  He has elected to continue to defer any consideration of a debulking/curative surgery to date, due to the risks involved (of bleeding, facial paralysis, etc.). Meanwhile, he discontinued Xarelto on his own in mid-2014 and has had no issues with recurrent, thrombotic events since then.    INTERIM HISTORY:  Mr. Munoz returns to clinic today for follow up again accompanied by his wife. Other than his chronic back pain and occasional sharp pains near the right parotid gland mass (along with decreased beard growth in this area), he continues to feel overall well and has no specific complaints. He does continue to admit that the mass is slowly causing more and more issues (including, recently, intermittent referred pains into his right ear). He did meet with [facility] in late 2016, and they have reportedly always been willing to perform the resection; however he remains reluctant to follow through with the surgery. He also states today that he has also yet to return to [facility] because \"they did not take out insurance\". He and his wife are switching insurances after the first year, at which time they plan to return to [facility].    Past Medical History:   Diagnosis Date   • Cancer " (CMS/[facility])    • Degenerative joint disease    • Diabetes mellitus (CMS/[facility])    • DVT, lower extremity (CMS/[facility])     left 2013   • Hypertension    • Peroneal DVT (deep venous thrombosis) (CMS/[facility])     in 1998 and dvt 2010   • Spinal stenosis    • Urolithiasis        Past Surgical History:   Procedure Laterality Date   • COLONOSCOPY     • CYSTOSCOPY W/ LITHOLAPAXY / [facility]     • INNER EAR SURGERY         Social History     Socioeconomic History   • Marital status:      Spouse name: Not on file   • Number of children: Not on file   • Years of education: Not on file   • Highest education level: Not on file   Social Needs   • Financial resource strain: Not on file   • Food insecurity - worry: Not on file   • Food insecurity - inability: Not on file   • Transportation needs - medical: Not on file   • Transportation needs - non-medical: Not on file   Occupational History   • Not on file   Tobacco Use   • Smoking status: Former Smoker     Years: 20.00   • Smokeless tobacco: Current User     Types: Chew   • Tobacco comment: occasionally uses chew once a year    Substance and Sexual Activity   • Alcohol use: No     Comment: beer occasionally   • Drug use: No   • Sexual activity: Not on file   Other Topics Concern   • Not on file   Social History Narrative   • Not on file       Family History   Problem Relation Age of Onset   • Other Other         deveral siblings havd had early onset DVTs       No Known Allergies    Current Outpatient Medications   Medication Sig Dispense Refill   • butalbital-acetaminophen-caffeine (FIORICET, ESGIC) -40 MG per tablet      • FREESTYLE LITE test strip      • gabapentin (NEURONTIN) 800 MG tablet 4 (Four) Times a Day.     • Insulin Glargine (BASAGLAR KWIKPEN) 100 UNIT/ML injection pen Inject  under the skin into the appropriate area as directed.     • KETOPROFEN PO Take 75 mg by mouth 3 (Three) Times a Day.     • omeprazole (PriLOSEC) 20 MG capsule      • oxyCODONE-acetaminophen  (PERCOCET) 7.5-325 MG per tablet Take 1 tablet by mouth Every 6 (Six) Hours As Needed.     • PHENTERMINE HCL PO Take  by mouth daily.     • pseudoephedrine (SUDAFED) 30 MG tablet Take 30 mg by mouth Every 4 (Four) Hours As Needed for Congestion.       No current facility-administered medications for this visit.      REVIEW OF SYSTEMS  CONSTITUTIONAL: No fever, chills, night sweats or weight loss.    EYES: No blurry vision, diplopia or other vision changes.    ENT: No hearing loss, nosebleeds or sore throat. Parotid gland mass, as per the HPI above.    CARDIOVASCULAR: No palpitations, arrhythmia, syncopal episodes or edema.    PULMONARY: No shortness of breath, hemoptysis, wheezing or chronic cough.    GASTROINTESTINAL: No nausea or vomiting. No constipation or diarrhea. No change in appetite. No abdominal pain.    GENITOURINARY: No hematuria, kidney stones or frequent urination.    MUSCULOSKELETAL: Chronic back pain.  INTEGUMENTARY: No rashes or pruritus.    ENDOCRINE: No excessive thirst or hot flashes.    HEMATOLOGIC: No history of free bleeding or spontaneous bleeding. Thrombosis history as above, last confirmed LLE DVT in December 2013.    IMMUNOLOGIC: No allergies or frequent infections.    NEUROLOGIC: No numbness, tingling, seizures or weakness.    PSYCHIATRIC: No anxiety or depression.      PHYSICAL EXAMINATION  /87   Pulse 89   Temp 97.3 °F (36.3 °C) (Oral)   Resp 20   Wt 133 kg (294 lb)   SpO2 98%   BMI 42.18 kg/m²     GENERAL:  A well-developed, well-nourished, morbidly obese, white male in no acute distress.  HEENT:  Pupils equally round and reactive to light.  Extraocular muscles intact.  Enlarged, firm, right-sided parotid mass, very slowly, but steadily, increasing in size compared to late 2015.  CARDIOVASCULAR:  Regular rate and rhythm.  No murmurs, gallops or rubs.  LUNGS:  Clear to auscultation bilaterally.  ABDOMEN:  Soft, nontender, nondistended with positive bowel  sounds.  EXTREMITIES:  No clubbing, cyanosis or edema bilaterally.  SKIN:  No rashes or petechiae.  NEURO:  Cranial nerves grossly intact.  No focal deficits.  PSYCH:  Alert and oriented x3.    LABORATORY    Lab Results   Component Value Date    WBC 9.16 2018    HGB 16.0 2018    HCT 46.4 2018    MCV 83.0 2018     2018    NEUTROABS 5.21 2018       Lab Results   Component Value Date     (L) 2018    K 6.3 (C) 2018     2018    CO2 26.5 2018    BUN 9 2018    CREATININE 0.86 2018    GLUCOSE 203 (H) 2018    CALCIUM 9.0 2018    AST 78 (H) 2017    ALT 98 (H) 2017    ALKPHOS 103 2017    BILITOT 0.4 2017    PROTEINTOT 7.0 2017    ALBUMIN 3.90 2017     Hypercoagulable studies (2014):    Factor V Leiden mutation: Negative; Factor II, DNA analysis (Prothrombin [id number] mutation): Negative    Antithrombin activity: 98%; Antithrombin antigen: 86%    Protein C antigen: 100%; Protein C-functional: 149%; Activated Protein C resistance: 3.0    Protein S total: 110%; Protein S free: 83%; Protein S-functional: 115%      Beta-2 glycoprotein I Ab, IgG: < 9; IgA: < 9; IgM: < 9    Anticardiolipin Ab, IgG: < 9; IgA: < 9; IgM: < 9    Antiphosphatidylserine Ig; IgA: <1; IgM: 6      Lupus anticoagulant Reflex: PTT-LA: 49.6; dRVVT: 68.8; dRVVT Mix: 50.2; dRVVT confirm: 1.2    (No lupus anticoagulant detected)      IMAGING  Duplex scan venous left lower extremity (2013):    Impression: Exam positive for DVT in the left popliteal and peroneal veins.      Duplex scan venous left lower extremity (2014):    Impression: Residual deep vein thrombosis in the left peroneal vein but the popliteal vein does not show deep vein thrombosis at this time.      CT neck soft tissue with contrast (2015):    Impression: 2.6 cm mass in the right parotid gland.    NM PET with fusion CT, skull base to  mid-thigh (06/26/2015, at ):    Impression: Intensely hypermetabolic mass centered within the deep lobe of the right parotid gland without evidence of abnormal FDG hypermetabolism elsewhere.      NM PET with fusion CT, skull base to mid-thigh (11/21/2015):  Impression: Intense hypermetabolic activity within the right masseter muscle. There is no hypermetabolic activity within the right parotid mass. Physiologic hypermetabolism only elsewhere.    CT soft tissue neck with contrast (08/18/2016):  Impression: 3.3 cm well-circumscribed right parotid gland mass.    CT chest with contrast (08/18/2016):  Impression: Stable evaluation of the chest.    NM PET with fusion CT, skull base to mid-thigh (05/05/2017, compared to PET from 11/2015 and CT scan of the soft tissues of the neck from 08/2016):  Impression: 3.3 cm well-circumscribed rounded mass in the right parotid gland. It is hypermetabolic with SUV value reading of 6.3. It is not changed in size. No other abnormalities were demonstrated.    PATHOLOGY  Parotid gland, right, mass, superficial FNA (05/18/2015):    Malignant spindled and epithelioid neoplasm.      Parotid gland, right, mass, superficial FNA ( 12/01/2015):    Malignant spindled and epithelioid neoplasm.     IMPRESSION AND PLAN  Mr. Munoz is a 48 y.o., white male with:  1. Parotid gland mass: Biopsied in both May and December 2015, with, both times, a diagnosis of a non-specific malignancy. A PET scan in June 2015 at the [facility] (summarized above) showed no evidence of hypermetabolic activity anywhere outside the parotid gland; and, a repeat PET scan about five months later, showed no evidence of either progression or metastatic spread. The parotid mass itself was not hypermetabolic at that time, while the cheek around it (likely due to inflammation) was the only area that was (hypermetabolic). I have had multiple long discussions with the patient and his wife in clinic over the past ~ three  and one half (plus) years regarding these findings. While a specific diagnosis has eluded us, the good news has been that this has been a very benign tumor (the most recent repeat imaging, CTs from mid-August 2016 and a followup PET scan on 05/05/2017, all summarized above, continued to show, at worst, the extremely slow growth of the parotid tumor with no evidence of disease elsewhere). Therefore, excisional surgery continues to be the mainstay of both diagnosis and therapy (which has always been, and still is, recommended, as this tumor will undoubtedly only continue to grow and cause progressive, localized symptoms, even if it is taking a very long time to do so), as there are no chemotherapeutic and/or radiotherapeutic options for such a slowly growing tumor. Unfortunately, given the mass' location within the parotid gland (and consequently, its involvement of fragile structures such as the facial nerve), this surgery would likely be relatively complicated. In fall 2016, he was finally agreeable to being referred to an academic center for an additional surgical opinion. [facility] has agreed to perform the resection, but the patient has always remained reluctant to actually go through with the procedure. He was planning on going back earlier this year; but he and his wife report today that nothing has been done because [facility] does not take their current insurance. They are changing insurances after the first year, at which time they plan to go back. In the meantime, with his continued noncompliance and surgical delays, it has been over a year since the most recent imaging, we will attempt to repeat a NM PET scan. We will see him back in clinic in two months (~mid-January 2019) with this imaging.  2. DVT: The patient has experienced two other acute events besides the documented lower extremity DVT in December 2013 (a PE at the time of his injury in 1998 and a DVT in his injured, right upper arm around  2008 or 2009). Regardless, the patient's history of severe traumatic injury has resulted in permanent damage to his right arm as well as to occasional periods of relative immobility (due to chronic, and at times worsening, back and referred nerve damage and pain). These are significant, chronic factors that have, and will continue to, increase his risk of thrombosis. Additionally, the results of the hypercoagulable studies sent in March 2014 (which are summarized above), while largely unremarkable, do show the possible presence of an antiphosphatidylserine antibody (the dRVVT results are consistent with, and likely due to, taking Xarelto; the other studies were within normal limits). He completed a total of six months of treatment by summer 2015; but, ultimately, his combination of lab results and clinical history led us to recommend indefinite anticoagulation. However, the patient discontinued the Xarelto in mid-2014 on his own. He still has not had any issues with recurrent thrombotic events since then. Continue to monitor.    The patient and his wife were in agreement with these plans.    ACO Quality Measures:  a) The patient does use tobacco products (he is a former smoker but current user of smokeless tobacco). During this visit, I spent < 3 minutes counseling the patient on the importance of immediate, complete and permanent abstinence from all tobacco products.  b) The patient's BMI is above normal parameters. Plan includes a referral to primary care.  c) The current outpatient and discharge medications have been reconciled for the patient.    It is a pleasure to participate in Mr. Munoz's care. Please do not hesitate to call with any questions or concerns that you may have.    A total of 30 minutes were spent coordinating this patient’s care in clinic today; more than 50% of this time was face-to-face with the patient and his wife, reviewing his interim medical history and counseling on the current  evaluation, treatment recommendations and followup plan. All questions were answered to their satisfaction.    FOLLOW UP  With [facility] for surgical resection of the right-sided parotid mass as soon as possible (the patient and his wife state, again, that they plan to go back after the first of the year, once their insurance changes over). Return to our clinic in 2 months (~mid-January 2019) with a NM PET scan.        This document was electronically signed by HUE Bonilla MD November 13, 2018 10:54 AM      CC: MD Sherman Walker MD

## 2018-11-18 ENCOUNTER — HOSPITAL ENCOUNTER (EMERGENCY)
Facility: HOSPITAL | Age: 48
Discharge: HOME | End: 2018-11-18
Attending: EMERGENCY MEDICINE | Admitting: EMERGENCY MEDICINE

## 2018-11-18 VITALS
SYSTOLIC BLOOD PRESSURE: 152 MMHG | OXYGEN SATURATION: 98 % | WEIGHT: 287 LBS | DIASTOLIC BLOOD PRESSURE: 83 MMHG | TEMPERATURE: 98 F | RESPIRATION RATE: 18 BRPM | BODY MASS INDEX: 41.09 KG/M2 | HEIGHT: 70 IN | HEART RATE: 82 BPM

## 2018-11-18 DIAGNOSIS — T15.92XA FOREIGN BODY, EYE, LEFT, INITIAL ENCOUNTER: Primary | ICD-10-CM

## 2018-11-18 DIAGNOSIS — S05.02XA ABRASION OF LEFT CORNEA, INITIAL ENCOUNTER: ICD-10-CM

## 2018-11-18 PROCEDURE — 99283 EMERGENCY DEPT VISIT LOW MDM: CPT

## 2018-11-18 RX ORDER — TETRACAINE HYDROCHLORIDE 5 MG/ML
2 SOLUTION OPHTHALMIC ONCE
Status: COMPLETED | OUTPATIENT
Start: 2018-11-18 | End: 2018-11-18

## 2018-11-18 RX ORDER — TETRACAINE HYDROCHLORIDE 5 MG/ML
SOLUTION OPHTHALMIC
Status: COMPLETED
Start: 2018-11-18 | End: 2018-11-18

## 2018-11-18 RX ORDER — ERYTHROMYCIN 5 MG/G
1 OINTMENT OPHTHALMIC ONCE
Status: COMPLETED | OUTPATIENT
Start: 2018-11-18 | End: 2018-11-18

## 2018-11-18 RX ORDER — DICLOFENAC SODIUM 1 MG/ML
2 SOLUTION/ DROPS OPHTHALMIC ONCE
Status: COMPLETED | OUTPATIENT
Start: 2018-11-18 | End: 2018-11-18

## 2018-11-18 RX ORDER — ERYTHROMYCIN 5 MG/G
OINTMENT OPHTHALMIC
Status: COMPLETED
Start: 2018-11-18 | End: 2018-11-18

## 2018-11-18 RX ADMIN — ERYTHROMYCIN 1 APPLICATION: 5 OINTMENT OPHTHALMIC at 21:42

## 2018-11-18 RX ADMIN — FLUORESCEIN SODIUM 1 STRIP: 1 STRIP OPHTHALMIC at 21:42

## 2018-11-18 RX ADMIN — DICLOFENAC SODIUM 2 DROP: 1 SOLUTION/ DROPS OPHTHALMIC at 22:16

## 2018-11-18 RX ADMIN — TETRACAINE HYDROCHLORIDE 2 DROP: 5 SOLUTION OPHTHALMIC at 21:42

## 2018-11-19 NOTE — ED PROVIDER NOTES
Subjective     History provided by:  Patient  Eye Problem   Location:  Left eye  Quality:  Aching  Severity:  Moderate  Onset quality:  Sudden  Duration:  1 day  Timing:  Constant  Progression:  Worsening  Chronicity:  New  Context: foreign body    Foreign body:  Metal  Relieved by:  Nothing  Associated symptoms: inflammation and redness        Review of Systems   Constitutional: Negative.  Negative for fever.   HENT: Negative.    Eyes: Positive for pain and redness.   Respiratory: Negative.    Cardiovascular: Negative.  Negative for chest pain.   Gastrointestinal: Negative.  Negative for abdominal pain.   Endocrine: Negative.    Genitourinary: Negative.  Negative for dysuria.   Skin: Negative.    Neurological: Negative.    Psychiatric/Behavioral: Negative.    All other systems reviewed and are negative.      Past Medical History:   Diagnosis Date   • Cancer (CMS/[facility])    • Degenerative joint disease    • Diabetes mellitus (CMS/[facility])    • DVT, lower extremity (CMS/[facility])     left 2013   • Hypertension    • Peroneal DVT (deep venous thrombosis) (CMS/[facility])     in 1998 and dvt 2010   • Spinal stenosis    • Urolithiasis        No Known Allergies    Past Surgical History:   Procedure Laterality Date   • COLONOSCOPY     • CYSTOSCOPY W/ LITHOLAPAXY / [facility]     • INNER EAR SURGERY         Family History   Problem Relation Age of Onset   • Other Other         deveral siblings havd had early onset DVTs       Social History     Socioeconomic History   • Marital status:      Spouse name: Not on file   • Number of children: Not on file   • Years of education: Not on file   • Highest education level: Not on file   Tobacco Use   • Smoking status: Former Smoker     Years: 20.00   • Smokeless tobacco: Current User     Types: Chew   • Tobacco comment: occasionally uses chew once a year    Substance and Sexual Activity   • Alcohol use: No     Comment: beer occasionally   • Drug use: No           Objective   Physical Exam    Constitutional: He is oriented to person, place, and time. He appears well-developed and well-nourished. No distress.   HENT:   Head: Normocephalic and atraumatic.   Right Ear: External ear normal.   Left Ear: External ear normal.   Nose: Nose normal.   Eyes: Conjunctivae and EOM are normal. Pupils are equal, round, and reactive to light.   Foreign body observed around the 5 o'clock position of the cornea.  Queen lamp revealed a moderate abrasion to the cornea.   Neck: Normal range of motion. Neck supple. No JVD present. No tracheal deviation present.   Cardiovascular: Normal rate.   No murmur heard.  Pulmonary/Chest: Effort normal. No respiratory distress. He has no wheezes.   Abdominal: Soft. There is no tenderness.   Musculoskeletal: Normal range of motion. He exhibits no edema or deformity.   Neurological: He is alert and oriented to person, place, and time. No cranial nerve deficit.   Skin: Skin is warm and dry. No rash noted. He is not diaphoretic. No erythema. No pallor.   Psychiatric: He has a normal mood and affect. His behavior is normal. Thought content normal.   Nursing note and vitals reviewed.      Foreign Body Removal - Ocular  Date/Time: 11/18/2018 10:08 PM  Performed by: Alexys Madrid PA  Authorized by: Haim Limon MD     Consent:     Consent obtained:  Verbal    Consent given by:  Patient  Location:     Location:  L corneal    Depth:  Superficial  Pre-procedure details:     Fluorescein exam: yes      Fluorescein uptake: yes      Corneal abrasion location:  Inferior  Anesthesia (see MAR for exact dosages):     Local anesthetic:  Tetracaine drops  Procedure details:     Localization method:  Direct visualization    Removal mechanism:  Moist cotton swab    Foreign bodies recovered:  1  Post-procedure details:     Confirmation:  No additional foreign bodies on visualization    Dressing:  Antibiotic ointment    Patient tolerance of procedure:  Tolerated well, no immediate  complications               ED Course                  MDM  Number of Diagnoses or Management Options  Abrasion of left cornea, initial encounter: new and does not require workup  Foreign body, eye, left, initial encounter: new and does not require workup  Risk of Complications, Morbidity, and/or Mortality  Presenting problems: low  Diagnostic procedures: low  Management options: low    Patient Progress  Patient progress: stable        Final diagnoses:   Foreign body, eye, left, initial encounter   Abrasion of left cornea, initial encounter            Alexys Madrid PA  11/18/18 [id number]

## 2018-11-19 NOTE — ED NOTES
Patient reports he got a piece of metal in his left eye from working on a [employer]      Egdar, Huma Infante RN  11/18/18 [id number]

## 2018-11-20 ENCOUNTER — APPOINTMENT (OUTPATIENT)
Dept: PET IMAGING | Facility: HOSPITAL | Age: 48
End: 2018-11-20
Attending: INTERNAL MEDICINE

## 2018-12-14 ENCOUNTER — HOSPITAL ENCOUNTER (OUTPATIENT)
Dept: PET IMAGING | Facility: HOSPITAL | Age: 48
Discharge: HOME | End: 2018-12-14
Attending: INTERNAL MEDICINE | Admitting: INTERNAL MEDICINE

## 2018-12-14 DIAGNOSIS — D11.0 BENIGN NEOPLASM PAROTID GLAND: ICD-10-CM

## 2018-12-14 PROCEDURE — 78815 PET IMAGE W/CT SKULL-THIGH: CPT

## 2018-12-14 PROCEDURE — 78815 PET IMAGE W/CT SKULL-THIGH: CPT | Performed by: RADIOLOGY

## 2018-12-14 PROCEDURE — 0 FLUDEOXYGLUCOSE F18 SOLUTION: Performed by: INTERNAL MEDICINE

## 2018-12-14 PROCEDURE — A9552 F18 FDG: HCPCS | Performed by: INTERNAL MEDICINE

## 2018-12-14 RX ADMIN — FLUDEOXYGLUCOSE F18 1 DOSE: 300 INJECTION INTRAVENOUS at 09:58

## 2019-03-20 ENCOUNTER — OFFICE VISIT (OUTPATIENT)
Dept: NEUROSURGERY | Facility: CLINIC | Age: 49
End: 2019-03-20

## 2019-03-20 VITALS
WEIGHT: 292.4 LBS | BODY MASS INDEX: 41.86 KG/M2 | SYSTOLIC BLOOD PRESSURE: 110 MMHG | TEMPERATURE: 97.1 F | DIASTOLIC BLOOD PRESSURE: 88 MMHG | HEIGHT: 70 IN

## 2019-03-20 DIAGNOSIS — M54.12 CERVICAL RADICULOPATHY: Primary | ICD-10-CM

## 2019-03-20 DIAGNOSIS — E11.8 TYPE 2 DIABETES MELLITUS WITH COMPLICATION, UNSPECIFIED WHETHER LONG TERM INSULIN USE: ICD-10-CM

## 2019-03-20 DIAGNOSIS — E66.01 CLASS 3 SEVERE OBESITY DUE TO EXCESS CALORIES WITH SERIOUS COMORBIDITY AND BODY MASS INDEX (BMI) OF 40.0 TO 44.9 IN ADULT (HCC): ICD-10-CM

## 2019-03-20 PROBLEM — E66.813 CLASS 3 SEVERE OBESITY DUE TO EXCESS CALORIES WITH SERIOUS COMORBIDITY AND BODY MASS INDEX (BMI) OF 40.0 TO 44.9 IN ADULT: Status: ACTIVE | Noted: 2019-03-20

## 2019-03-20 PROCEDURE — 99244 OFF/OP CNSLTJ NEW/EST MOD 40: CPT | Performed by: NEUROLOGICAL SURGERY

## 2019-03-20 NOTE — PROGRESS NOTES
Subjective     Chief Complaint: neck pain    Patient ID: Alek Munoz is a 48 y.o. male seen for consultation today at the request of  Arnol Torres MD    He has a history of chronic neck pain ever since suffering a crush injury approximately 20 years ago.  He has been under the direction of a pain management doctor since that time.  He reports the relatively abrupt onset of severe left neck and shoulder pain without antecedent trauma.  He has tried chiropractic manipulation without relief.  He is on chronic steroids and high-dose Neurontin.  He has taken 2 Medrol Dosepaks.  He currently rates his pain at a 2/10.  His pain is exacerbated with movement and activity of his left shoulder.  It is alleviated with rest and elevation.        Neck Pain    This is a recurrent problem. The current episode started more than 1 month ago. The problem occurs constantly. The problem has been unchanged. The pain is associated with nothing. The pain is present in the left side. The pain is at a severity of 2/10. The pain is mild. The symptoms are aggravated by twisting. The pain is worse during the day. Stiffness is present in the morning. Associated symptoms include headaches, numbness and weakness. Pertinent negatives include no chest pain, fever, photophobia or trouble swallowing.       This is a 48-year-old man who presents to my office with chief complaints of severe left-sided neck pain and left arm and shoulder pain.    The following portions of the patient's history were reviewed and updated as appropriate: allergies, current medications, past family history, past medical history, past social history, past surgical history and problem list.    Family history:   Family History   Problem Relation Age of Onset   • Other Other         deveral siblings havd had early onset DVTs   • No Known Problems Mother    • No Known Problems Father        Social history:   Social History     Socioeconomic History   • Marital status:       Spouse name: Not on file   • Number of children: Not on file   • Years of education: Not on file   • Highest education level: Not on file   Tobacco Use   • Smoking status: Former Smoker     Years: 20.00     Last attempt to quit:      Years since quittin.2   • Smokeless tobacco: Current User     Types: Chew   • Tobacco comment: occasionally uses chew once a year    Substance and Sexual Activity   • Alcohol use: No     Comment: beer occasionally   • Drug use: No       Review of Systems   Constitutional: Positive for diaphoresis. Negative for activity change, appetite change, chills, fatigue, fever and unexpected weight change.   HENT: Negative for congestion, dental problem, drooling, ear discharge, ear pain, facial swelling, hearing loss, mouth sores, nosebleeds, postnasal drip, rhinorrhea, sinus pressure, sinus pain, sneezing, sore throat, tinnitus, trouble swallowing and voice change.    Eyes: Positive for visual disturbance. Negative for photophobia, pain, discharge, redness and itching.   Respiratory: Negative for apnea, cough, choking, chest tightness, shortness of breath, wheezing and stridor.    Cardiovascular: Negative.  Negative for chest pain, palpitations and leg swelling.   Gastrointestinal: Negative.  Negative for abdominal distention, abdominal pain, anal bleeding, blood in stool, constipation, diarrhea, nausea, rectal pain and vomiting.   Endocrine: Positive for polydipsia. Negative for cold intolerance, heat intolerance, polyphagia and polyuria.   Genitourinary: Negative.  Negative for decreased urine volume, difficulty urinating, dysuria, enuresis, flank pain, frequency, genital sores, hematuria and urgency.   Musculoskeletal: Positive for arthralgias, back pain, myalgias, neck pain and neck stiffness. Negative for gait problem and joint swelling.   Skin: Negative.  Negative for color change, pallor, rash and wound.   Allergic/Immunologic: Negative.  Negative for environmental  "allergies, food allergies and immunocompromised state.   Neurological: Positive for weakness, numbness and headaches. Negative for dizziness, tremors, seizures, syncope, facial asymmetry, speech difficulty and light-headedness.   Hematological: Negative.  Negative for adenopathy. Does not bruise/bleed easily.   Psychiatric/Behavioral: Positive for agitation, decreased concentration and sleep disturbance. Negative for behavioral problems, confusion, dysphoric mood, hallucinations, self-injury and suicidal ideas. The patient is not nervous/anxious and is not hyperactive.    All other systems reviewed and are negative.      Objective   Blood pressure 110/88, temperature 97.1 °F (36.2 °C), temperature source Temporal, height 177.8 cm (70\"), weight 133 kg (292 lb 6.4 oz).  Body mass index is 41.96 kg/m².    Physical Exam   Constitutional: He is oriented to person, place, and time. He appears well-developed.  Non-toxic appearance.   HENT:   Head: Normocephalic and atraumatic.   Right Ear: Hearing normal.   Left Ear: Hearing normal.   Nose: Nose normal.   Eyes: Conjunctivae, EOM and lids are normal. Pupils are equal, round, and reactive to light.   Neck: No JVD present. Muscular tenderness present. Decreased range of motion present.   Cardiovascular: Normal rate and regular rhythm.   Pulses:       Radial pulses are 2+ on the right side, and 2+ on the left side.   Pulmonary/Chest: Effort normal. No stridor. No respiratory distress. He has no wheezes.   Musculoskeletal:   5/5 strength proximally distally in his bilateral upper extremities except for his left deltoid which I grade at 4/5   Neurological: He is alert and oriented to person, place, and time. He displays normal reflexes. No cranial nerve deficit. He exhibits normal muscle tone. He displays a negative Romberg sign. Coordination and gait normal. GCS eye subscore is 4. GCS verbal subscore is 5. GCS motor subscore is 6.   Reflex Scores:       Tricep reflexes are 1+ " on the right side and 1+ on the left side.       Bicep reflexes are 1+ on the right side and 1+ on the left side.       Brachioradialis reflexes are 1+ on the right side and 1+ on the left side.  Skin: Skin is warm and dry. No rash noted. No erythema.   Psychiatric: He has a normal mood and affect. His behavior is normal. Judgment and thought content normal.   Nursing note and vitals reviewed.        Assessment/Plan     Independent Review of Radiographic Studies:      Available for my review is a MRI of the cervical spine which was performed on 3/8/2019.  This demonstrates a large herniated disc at C6-7 which is eccentric to the left side causing compression of the exiting C7 nerve root.  There is a smaller degenerative disc disease/disc osteophyte complex at C5-6 which does not appear to be causing any significant lateral recess or neuroforaminal stenosis.    Medical Decision Making:      This is a 48-year-old man with a subacute presentation of a ruptured disc at C6-7 and what sounds like a resolving C7 radiculopathy.  I would like for him to continue his conservative treatment.  If his arm or shoulder pain recurs or fails to respond to steroid injections, then he would be a candidate for a C6-7 ACDF, however due to his body habitus this procedure would carry slightly higher risk than normal.  Specifically, I am concerned that I would not be able to visualize the C6-7 disc space on fluoroscopy which would require me to expose and dissect a significant portion of his subaxial cervical spine.  Nonetheless, a C6-7 ACDF is a reasonable treatment option for him if he fails conservative treatment.  I did briefly review the surgery with him including the attendant risks and benefits.  He is at increased risk of periprocedural complications due to his body habitus and his diabetes.  I would like to follow-up with him after his undergone a steroid injection, specifically a left C6-C7 transforaminal injection.    Alek was  seen today for neck pain.    Diagnoses and all orders for this visit:    Cervical radiculopathy    Class 3 severe obesity due to excess calories with serious comorbidity and body mass index (BMI) of 40.0 to 44.9 in adult (CMS/[facility])    Type 2 diabetes mellitus with complication, unspecified whether long term insulin use (CMS/[facility])        Return if symptoms worsen or fail to improve.           This document signed by ZEKE Francis MD March 20, 2019 1:56 PM

## 2019-03-26 ENCOUNTER — TELEPHONE (OUTPATIENT)
Dept: NEUROSURGERY | Facility: CLINIC | Age: 49
End: 2019-03-26

## 2019-03-26 NOTE — TELEPHONE ENCOUNTER
Patient should see his PCP that recently prescribed him Percocet per his Yogi (Dr. Arnol Torres) for pain medication.

## 2019-03-26 NOTE — TELEPHONE ENCOUNTER
Called patient back, no answer. Left detailed VM for them to contact Dr. Moffett since they have been prescribing pain medication in the past.

## 2019-03-26 NOTE — TELEPHONE ENCOUNTER
Provider: Tito   Caller: Hannah ESTES  Time of call:     Phone #:  [phone]  Surgery:  n/a  Surgery Date:    Last visit:  3/20/19   Next visit:     SUSSY:    02/07/2019 Gabapentin 800MG 1972 120 30 JENNIFER TOMLINSON [facility] 1  02/07/2019 Testosterone Cypionate  200MG/ML/9.45MG/ML/0.2ML/  1972 2 30 SASKIA CAMPOS [facility] 1  02/08/2019 Oxycodone/Acetaminophen  325MG/7.5MG  1972 120 30 JENNIFER TOMLINSON [facility] 45 1  03/05/2019 Phentermine Hcl 37.5MG 1972 30 30 SASKIA CAMPOS [facility] 1        Reason for call:       Patient can not be seen by pain management for an injection for a couple of weeks. Patient would like to know if he can get something to help with the pain till then.

## 2019-04-08 ENCOUNTER — TELEPHONE (OUTPATIENT)
Dept: NEUROSURGERY | Facility: CLINIC | Age: 49
End: 2019-04-08

## 2019-04-08 DIAGNOSIS — M54.12 CERVICAL RADICULOPATHY: Primary | ICD-10-CM

## 2019-04-08 NOTE — TELEPHONE ENCOUNTER
I will put a new referral in.  Let see if there is a PM doctor that can get him in fairly quickly.

## 2019-07-05 ENCOUNTER — APPOINTMENT (OUTPATIENT)
Dept: CT IMAGING | Facility: HOSPITAL | Age: 49
End: 2019-07-05

## 2019-07-05 ENCOUNTER — HOSPITAL ENCOUNTER (EMERGENCY)
Facility: HOSPITAL | Age: 49
Discharge: ANOTHER HOSPITAL | End: 2019-07-05
Attending: FAMILY MEDICINE | Admitting: FAMILY MEDICINE

## 2019-07-05 VITALS
HEIGHT: 70 IN | BODY MASS INDEX: 42.95 KG/M2 | RESPIRATION RATE: 20 BRPM | HEART RATE: 84 BPM | OXYGEN SATURATION: 95 % | SYSTOLIC BLOOD PRESSURE: 132 MMHG | DIASTOLIC BLOOD PRESSURE: 81 MMHG | WEIGHT: 300 LBS | TEMPERATURE: 98.5 F

## 2019-07-05 DIAGNOSIS — N13.9 OBSTRUCTIVE UROPATHY: Primary | ICD-10-CM

## 2019-07-05 LAB
ALBUMIN SERPL-MCNC: 4.18 G/DL (ref 3.5–5.2)
ALBUMIN/GLOB SERPL: 1.3 G/DL
ALP SERPL-CCNC: 88 U/L (ref 39–117)
ALT SERPL W P-5'-P-CCNC: 38 U/L (ref 1–41)
ANION GAP SERPL CALCULATED.3IONS-SCNC: 11.2 MMOL/L (ref 5–15)
APTT PPP: 26 SECONDS (ref 23.8–36.1)
AST SERPL-CCNC: 24 U/L (ref 1–40)
BACTERIA UR QL AUTO: ABNORMAL /HPF
BASOPHILS # BLD AUTO: 0.02 10*3/MM3 (ref 0–0.2)
BASOPHILS NFR BLD AUTO: 0.2 % (ref 0–1.5)
BILIRUB SERPL-MCNC: 0.4 MG/DL (ref 0.2–1.2)
BILIRUB UR QL STRIP: NEGATIVE
BUN BLD-MCNC: 10 MG/DL (ref 6–20)
BUN/CREAT SERPL: 10.1 (ref 7–25)
CALCIUM SPEC-SCNC: 9.6 MG/DL (ref 8.6–10.5)
CHLORIDE SERPL-SCNC: 101 MMOL/L (ref 98–107)
CLARITY UR: CLEAR
CO2 SERPL-SCNC: 26.8 MMOL/L (ref 22–29)
COLOR UR: YELLOW
CREAT BLD-MCNC: 0.99 MG/DL (ref 0.76–1.27)
DEPRECATED RDW RBC AUTO: 40.3 FL (ref 37–54)
EOSINOPHIL # BLD AUTO: 0.14 10*3/MM3 (ref 0–0.4)
EOSINOPHIL NFR BLD AUTO: 1.4 % (ref 0.3–6.2)
ERYTHROCYTE [DISTWIDTH] IN BLOOD BY AUTOMATED COUNT: 12.9 % (ref 12.3–15.4)
GFR SERPL CREATININE-BSD FRML MDRD: 81 ML/MIN/1.73
GLOBULIN UR ELPH-MCNC: 3.3 GM/DL
GLUCOSE BLD-MCNC: 154 MG/DL (ref 65–99)
GLUCOSE BLDC GLUCOMTR-MCNC: 157 MG/DL (ref 70–130)
GLUCOSE UR STRIP-MCNC: NEGATIVE MG/DL
HCT VFR BLD AUTO: 49.9 % (ref 37.5–51)
HGB BLD-MCNC: 16.9 G/DL (ref 13–17.7)
HGB UR QL STRIP.AUTO: ABNORMAL
HYALINE CASTS UR QL AUTO: ABNORMAL /LPF
IMM GRANULOCYTES # BLD AUTO: 0.05 10*3/MM3 (ref 0–0.05)
IMM GRANULOCYTES NFR BLD AUTO: 0.5 % (ref 0–0.5)
INR PPP: 0.9 (ref 0.9–1.1)
KETONES UR QL STRIP: NEGATIVE
LEUKOCYTE ESTERASE UR QL STRIP.AUTO: NEGATIVE
LYMPHOCYTES # BLD AUTO: 3.02 10*3/MM3 (ref 0.7–3.1)
LYMPHOCYTES NFR BLD AUTO: 30.7 % (ref 19.6–45.3)
MCH RBC QN AUTO: 29.3 PG (ref 26.6–33)
MCHC RBC AUTO-ENTMCNC: 33.9 G/DL (ref 31.5–35.7)
MCV RBC AUTO: 86.6 FL (ref 79–97)
MONOCYTES # BLD AUTO: 0.7 10*3/MM3 (ref 0.1–0.9)
MONOCYTES NFR BLD AUTO: 7.1 % (ref 5–12)
NEUTROPHILS # BLD AUTO: 5.92 10*3/MM3 (ref 1.7–7)
NEUTROPHILS NFR BLD AUTO: 60.1 % (ref 42.7–76)
NITRITE UR QL STRIP: NEGATIVE
PH UR STRIP.AUTO: <=5 [PH] (ref 5–8)
PLATELET # BLD AUTO: 217 10*3/MM3 (ref 140–450)
PMV BLD AUTO: 9.3 FL (ref 6–12)
POTASSIUM BLD-SCNC: 4.8 MMOL/L (ref 3.5–5.2)
PROT SERPL-MCNC: 7.5 G/DL (ref 6–8.5)
PROT UR QL STRIP: NEGATIVE
PROTHROMBIN TIME: 12.6 SECONDS (ref 11–15.4)
RBC # BLD AUTO: 5.76 10*6/MM3 (ref 4.14–5.8)
RBC # UR: ABNORMAL /HPF
REF LAB TEST METHOD: ABNORMAL
SODIUM BLD-SCNC: 139 MMOL/L (ref 136–145)
SP GR UR STRIP: 1.02 (ref 1–1.03)
SQUAMOUS #/AREA URNS HPF: ABNORMAL /HPF
UROBILINOGEN UR QL STRIP: ABNORMAL
WBC NRBC COR # BLD: 9.85 10*3/MM3 (ref 3.4–10.8)
WBC UR QL AUTO: ABNORMAL /HPF

## 2019-07-05 PROCEDURE — 25010000002 HYDROMORPHONE 1 MG/ML SOLUTION: Performed by: FAMILY MEDICINE

## 2019-07-05 PROCEDURE — 96376 TX/PRO/DX INJ SAME DRUG ADON: CPT

## 2019-07-05 PROCEDURE — 85730 THROMBOPLASTIN TIME PARTIAL: CPT | Performed by: FAMILY MEDICINE

## 2019-07-05 PROCEDURE — 81001 URINALYSIS AUTO W/SCOPE: CPT | Performed by: FAMILY MEDICINE

## 2019-07-05 PROCEDURE — 74176 CT ABD & PELVIS W/O CONTRAST: CPT | Performed by: RADIOLOGY

## 2019-07-05 PROCEDURE — 25010000002 FENTANYL CITRATE (PF) 100 MCG/2ML SOLUTION: Performed by: FAMILY MEDICINE

## 2019-07-05 PROCEDURE — 96374 THER/PROPH/DIAG INJ IV PUSH: CPT

## 2019-07-05 PROCEDURE — 25010000002 KETOROLAC TROMETHAMINE PER 15 MG: Performed by: FAMILY MEDICINE

## 2019-07-05 PROCEDURE — 25010000002 PROMETHAZINE PER 50 MG: Performed by: FAMILY MEDICINE

## 2019-07-05 PROCEDURE — 80053 COMPREHEN METABOLIC PANEL: CPT | Performed by: FAMILY MEDICINE

## 2019-07-05 PROCEDURE — 99285 EMERGENCY DEPT VISIT HI MDM: CPT

## 2019-07-05 PROCEDURE — 74176 CT ABD & PELVIS W/O CONTRAST: CPT

## 2019-07-05 PROCEDURE — 82962 GLUCOSE BLOOD TEST: CPT

## 2019-07-05 PROCEDURE — 96375 TX/PRO/DX INJ NEW DRUG ADDON: CPT

## 2019-07-05 PROCEDURE — 85610 PROTHROMBIN TIME: CPT | Performed by: FAMILY MEDICINE

## 2019-07-05 PROCEDURE — 96361 HYDRATE IV INFUSION ADD-ON: CPT

## 2019-07-05 PROCEDURE — 25010000002 ONDANSETRON PER 1 MG: Performed by: FAMILY MEDICINE

## 2019-07-05 PROCEDURE — 85025 COMPLETE CBC W/AUTO DIFF WBC: CPT | Performed by: FAMILY MEDICINE

## 2019-07-05 PROCEDURE — 25010000002 MORPHINE PER 10 MG: Performed by: FAMILY MEDICINE

## 2019-07-05 RX ORDER — KETOROLAC TROMETHAMINE 30 MG/ML
15 INJECTION, SOLUTION INTRAMUSCULAR; INTRAVENOUS ONCE
Status: COMPLETED | OUTPATIENT
Start: 2019-07-05 | End: 2019-07-05

## 2019-07-05 RX ORDER — KETAMINE HCL IN 0.9 % NACL 50 MG/5 ML
0.1 SYRINGE (ML) INTRAVENOUS ONCE
Status: DISCONTINUED | OUTPATIENT
Start: 2019-07-05 | End: 2019-07-05

## 2019-07-05 RX ORDER — MORPHINE SULFATE 2 MG/ML
2 INJECTION, SOLUTION INTRAMUSCULAR; INTRAVENOUS ONCE
Status: COMPLETED | OUTPATIENT
Start: 2019-07-05 | End: 2019-07-05

## 2019-07-05 RX ORDER — SODIUM CHLORIDE 0.9 % (FLUSH) 0.9 %
10 SYRINGE (ML) INJECTION AS NEEDED
Status: DISCONTINUED | OUTPATIENT
Start: 2019-07-05 | End: 2019-07-05 | Stop reason: HOSPADM

## 2019-07-05 RX ORDER — KETOROLAC TROMETHAMINE 30 MG/ML
15 INJECTION, SOLUTION INTRAMUSCULAR; INTRAVENOUS EVERY 6 HOURS PRN
Status: DISCONTINUED | OUTPATIENT
Start: 2019-07-05 | End: 2019-07-05 | Stop reason: HOSPADM

## 2019-07-05 RX ORDER — KETAMINE HYDROCHLORIDE 50 MG/ML
13.5 INJECTION, SOLUTION, CONCENTRATE INTRAMUSCULAR; INTRAVENOUS ONCE
Status: COMPLETED | OUTPATIENT
Start: 2019-07-05 | End: 2019-07-05

## 2019-07-05 RX ORDER — ONDANSETRON 2 MG/ML
4 INJECTION INTRAMUSCULAR; INTRAVENOUS ONCE
Status: COMPLETED | OUTPATIENT
Start: 2019-07-05 | End: 2019-07-05

## 2019-07-05 RX ORDER — HYDROMORPHONE HYDROCHLORIDE 1 MG/ML
0.5 INJECTION, SOLUTION INTRAMUSCULAR; INTRAVENOUS; SUBCUTANEOUS ONCE
Status: COMPLETED | OUTPATIENT
Start: 2019-07-05 | End: 2019-07-05

## 2019-07-05 RX ORDER — FENTANYL CITRATE 50 UG/ML
25 INJECTION, SOLUTION INTRAMUSCULAR; INTRAVENOUS ONCE
Status: COMPLETED | OUTPATIENT
Start: 2019-07-05 | End: 2019-07-05

## 2019-07-05 RX ADMIN — SODIUM CHLORIDE 2190 ML: 9 INJECTION, SOLUTION INTRAVENOUS at 02:53

## 2019-07-05 RX ADMIN — FENTANYL CITRATE 25 MCG: 50 INJECTION, SOLUTION INTRAMUSCULAR; INTRAVENOUS at 06:25

## 2019-07-05 RX ADMIN — LIDOCAINE HYDROCHLORIDE 150 MG: 10 INJECTION, SOLUTION INFILTRATION; PERINEURAL at 05:06

## 2019-07-05 RX ADMIN — MORPHINE SULFATE 2 MG: 2 INJECTION, SOLUTION INTRAMUSCULAR; INTRAVENOUS at 02:53

## 2019-07-05 RX ADMIN — KETAMINE HYDROCHLORIDE 13.5 MG: 50 INJECTION, SOLUTION INTRAMUSCULAR; INTRAVENOUS at 10:09

## 2019-07-05 RX ADMIN — KETOROLAC TROMETHAMINE 15 MG: 30 INJECTION, SOLUTION INTRAMUSCULAR; INTRAVENOUS at 02:54

## 2019-07-05 RX ADMIN — PROMETHAZINE HYDROCHLORIDE 6.25 MG: 25 INJECTION INTRAMUSCULAR; INTRAVENOUS at 06:33

## 2019-07-05 RX ADMIN — KETOROLAC TROMETHAMINE 15 MG: 30 INJECTION, SOLUTION INTRAMUSCULAR; INTRAVENOUS at 05:53

## 2019-07-05 RX ADMIN — MORPHINE SULFATE 2 MG: 2 INJECTION, SOLUTION INTRAMUSCULAR; INTRAVENOUS at 03:42

## 2019-07-05 RX ADMIN — HYDROMORPHONE HYDROCHLORIDE 0.5 MG: 1 INJECTION, SOLUTION INTRAMUSCULAR; INTRAVENOUS; SUBCUTANEOUS at 04:26

## 2019-07-05 RX ADMIN — ONDANSETRON 4 MG: 2 INJECTION, SOLUTION INTRAMUSCULAR; INTRAVENOUS at 02:53

## 2019-07-05 NOTE — ED PROVIDER NOTES
Subjective   47 yo male with past medical history of DM, HTN presents with left sided flank pain- that started about 2 hours prior to arrival. Pt reports that he gets recurrent kidney stones and has required interviention. States that last week he had right sided stones that he was able to pass. However this one he is hurting so bad and cant get it under control.          History provided by:  Patient  Flank Pain   Pain location:  L flank  Pain quality: gnawing and sharp    Pain radiates to:  Groin  Pain severity:  Severe  Onset quality:  Sudden  Duration:  3 hours  Timing:  Constant  Progression:  Unchanged  Chronicity:  New  Context: not alcohol use, not awakening from sleep, not diet changes, not eating, not medication withdrawal, not previous surgeries, not recent illness, not recent sexual activity, not retching, not sick contacts and not suspicious food intake    Relieved by:  Nothing  Worsened by:  Nothing  Associated symptoms: nausea    Associated symptoms: no anorexia, no belching, no chest pain, no chills, no constipation, no cough, no dysuria, no fatigue, no fever, no hematemesis, no hematochezia, no hematuria and no vaginal bleeding    Risk factors: obesity    Risk factors: no alcohol abuse, no aspirin use, not elderly, has not had multiple surgeries, no NSAID use, not pregnant and no recent hospitalization        Review of Systems   Constitutional: Negative.  Negative for chills, fatigue and fever.   HENT: Negative.    Respiratory: Negative.  Negative for cough.    Cardiovascular: Negative.  Negative for chest pain.   Gastrointestinal: Positive for nausea. Negative for abdominal pain, anorexia, constipation, hematemesis and hematochezia.   Endocrine: Negative.    Genitourinary: Positive for flank pain. Negative for dysuria, hematuria and vaginal bleeding.   Skin: Negative.    Neurological: Negative.    Psychiatric/Behavioral: Negative.    All other systems reviewed and are negative.      Past Medical  History:   Diagnosis Date   • Cancer (CMS/HCC)    • Degenerative joint disease    • Diabetes mellitus (CMS/HCC)    • DVT, lower extremity (CMS/HCC)     left    • Hypertension    • Peroneal DVT (deep venous thrombosis) (CMS/HCC)     in  and dvt    • Spinal stenosis    • Urolithiasis        No Known Allergies    Past Surgical History:   Procedure Laterality Date   • COLONOSCOPY     • CYSTOSCOPY W/ LITHOLAPAXY / [facility]     • INNER EAR SURGERY     • OTHER SURGICAL HISTORY      Mass removed from left side of face       Family History   Problem Relation Age of Onset   • Other Other         deveral siblings havd had early onset DVTs   • No Known Problems Mother    • No Known Problems Father        Social History     Socioeconomic History   • Marital status:      Spouse name: Not on file   • Number of children: Not on file   • Years of education: Not on file   • Highest education level: Not on file   Tobacco Use   • Smoking status: Former Smoker     Years: 20.00     Last attempt to quit:      Years since quittin.5   • Smokeless tobacco: Current User     Types: Chew   • Tobacco comment: occasionally uses chew once a year    Substance and Sexual Activity   • Alcohol use: No     Comment: beer occasionally   • Drug use: No   • Sexual activity: Defer           Objective   Physical Exam   Constitutional: He is oriented to person, place, and time. He appears distressed.   HENT:   Head: Normocephalic and atraumatic.   Right Ear: External ear normal.   Left Ear: External ear normal.   Mouth/Throat: Oropharynx is clear and moist.   Eyes: EOM are normal. Pupils are equal, round, and reactive to light.   Neck: Neck supple.   Cardiovascular: Normal rate and regular rhythm.   Pulmonary/Chest: Effort normal and breath sounds normal.   Abdominal: Soft. Bowel sounds are normal.   Musculoskeletal: Normal range of motion.   Neurological: He is alert and oriented to person, place, and time.   Skin: Skin is warm.  Capillary refill takes less than 2 seconds.   Psychiatric: He has a normal mood and affect. His behavior is normal. Judgment and thought content normal.   Nursing note and vitals reviewed.      Procedures           ED Course  ED Course as of Jul 05 2044 Fri Jul 05, 2019   0600 NO urology at our facility until Monday. NO urology coverage at [facility] until Monday.  [MH]   0705 DIscussed with Dr Caba ( urology at ) who agrees that with his pain unable to control pt will need to be evaluated by urology- however  currently on divert.- Discussed with family for next choice  want to try [facility] .   [MH]   0726 Discussed with Dr Chambers, urology at [facility], who agrees that pt needs to be seen and evaluted. Advises I admit to the hospitalist  and keep the patient NPO for possible lithotripsy.  [MH]   0735 DIscussed with Dr Lo (hospitalist) who accepts pt to [facility] -- will keep NPO  [MH]   0754 CT 8x7mm left proximal ureteral stone with moderate hyponephrosis per [facility]  [MH]      ED Course User Index  [MH] Svetlana, Shea Dixon, DO                  MDM  Number of Diagnoses or Management Options  Obstructive uropathy: new and requires workup     Amount and/or Complexity of Data Reviewed  Clinical lab tests: ordered and reviewed  Tests in the radiology section of CPT®: ordered and reviewed  Tests in the medicine section of CPT®: reviewed and ordered  Decide to obtain previous medical records or to obtain history from someone other than the patient: yes  Discuss the patient with other providers: yes  Independent visualization of images, tracings, or specimens: yes    Risk of Complications, Morbidity, and/or Mortality  Presenting problems: high  Diagnostic procedures: high  Management options: high    Critical Care  Total time providing critical care: 30-74 minutes (40 minutes of critical care provided. This time excludes other billable procedures. Time does include preparation of documents, medical  consultations, review of old records, and direct bedside care. Patient was at high risk for life-threatening deterioration due to obstructive uropathy with uncontrolled pain.   )    Patient Progress  Patient progress: (guarded)        Final diagnoses:   Obstructive uropathy            Shea Mota DO  07/05/19 2044

## 2019-07-05 NOTE — ED NOTES
Called [facility] accepted pt transfer to [facility]  No eta at this time      Nic Yang  07/05/19 09

## 2019-07-05 NOTE — ED NOTES
Pt being transferred to [facility]      Nic Yang  07/05/19 0740       Nic Yang  07/05/19 0740

## 2019-07-05 NOTE — ED NOTES
Patient reports that he is still in pain at this time, Dr. Mota was made aware. New orders are being place. Pt states that the lidocaine he received provided some relief.     Mitchel Galeano RN  07/05/19 0639

## 2019-07-05 NOTE — ED NOTES
Patient states to Dr. Mota at this time that he wants to be admitted to the hospital.     Mitchel Galeano RN  07/05/19 0643

## 2019-07-05 NOTE — ED NOTES
Dr. Mota at bedside updating pt and girlfriend on plan of care at this time.     Mitchel Galeano RN  07/05/19 0624

## 2019-07-05 NOTE — ED NOTES
Toradol given at this time to see if it provides pain relief.      Mitchel Galeano RN  07/05/19 0694

## 2019-07-05 NOTE — ED NOTES
"ATTEMPTED TO CALL REPORT TO [facility] BUT NURSE TAKING REPORT NOT AVAILABLE AND PERSON ANSWERING THE PHONE STATED \" SHE WOULD LET HER KNOW AND SHE WOULD CALL ME BACK\",     Franco Sheth RN  07/05/19 5034    "

## 2019-12-05 ENCOUNTER — HOSPITAL ENCOUNTER (EMERGENCY)
Facility: HOSPITAL | Age: 49
Discharge: HOME | End: 2019-12-05
Attending: EMERGENCY MEDICINE | Admitting: EMERGENCY MEDICINE

## 2019-12-05 ENCOUNTER — APPOINTMENT (OUTPATIENT)
Dept: ULTRASOUND IMAGING | Facility: HOSPITAL | Age: 49
End: 2019-12-05

## 2019-12-05 ENCOUNTER — APPOINTMENT (OUTPATIENT)
Dept: CT IMAGING | Facility: HOSPITAL | Age: 49
End: 2019-12-05

## 2019-12-05 VITALS
RESPIRATION RATE: 16 BRPM | SYSTOLIC BLOOD PRESSURE: 123 MMHG | WEIGHT: 313 LBS | DIASTOLIC BLOOD PRESSURE: 76 MMHG | HEIGHT: 70 IN | TEMPERATURE: 97.1 F | HEART RATE: 80 BPM | BODY MASS INDEX: 44.81 KG/M2 | OXYGEN SATURATION: 97 %

## 2019-12-05 DIAGNOSIS — R42 VERTIGO: Primary | ICD-10-CM

## 2019-12-05 LAB
ALBUMIN SERPL-MCNC: 4 G/DL (ref 3.5–5.2)
ALBUMIN/GLOB SERPL: 1.2 G/DL
ALP SERPL-CCNC: 78 U/L (ref 39–117)
ALT SERPL W P-5'-P-CCNC: 56 U/L (ref 1–41)
AMYLASE SERPL-CCNC: 39 U/L (ref 28–100)
ANION GAP SERPL CALCULATED.3IONS-SCNC: 11.2 MMOL/L (ref 5–15)
AST SERPL-CCNC: 51 U/L (ref 1–40)
BASOPHILS # BLD AUTO: 0.02 10*3/MM3 (ref 0–0.2)
BASOPHILS NFR BLD AUTO: 0.3 % (ref 0–1.5)
BILIRUB SERPL-MCNC: 0.5 MG/DL (ref 0.2–1.2)
BILIRUB UR QL STRIP: NEGATIVE
BUN BLD-MCNC: 14 MG/DL (ref 6–20)
BUN/CREAT SERPL: 17.1 (ref 7–25)
CALCIUM SPEC-SCNC: 9.1 MG/DL (ref 8.6–10.5)
CHLORIDE SERPL-SCNC: 99 MMOL/L (ref 98–107)
CLARITY UR: CLEAR
CO2 SERPL-SCNC: 24.8 MMOL/L (ref 22–29)
COLOR UR: YELLOW
CREAT BLD-MCNC: 0.82 MG/DL (ref 0.76–1.27)
DEPRECATED RDW RBC AUTO: 38.5 FL (ref 37–54)
EOSINOPHIL # BLD AUTO: 0.11 10*3/MM3 (ref 0–0.4)
EOSINOPHIL NFR BLD AUTO: 1.4 % (ref 0.3–6.2)
ERYTHROCYTE [DISTWIDTH] IN BLOOD BY AUTOMATED COUNT: 12.8 % (ref 12.3–15.4)
GFR SERPL CREATININE-BSD FRML MDRD: 101 ML/MIN/1.73
GLOBULIN UR ELPH-MCNC: 3.3 GM/DL
GLUCOSE BLD-MCNC: 144 MG/DL (ref 65–99)
GLUCOSE BLDC GLUCOMTR-MCNC: 148 MG/DL (ref 70–130)
GLUCOSE UR STRIP-MCNC: NEGATIVE MG/DL
HCT VFR BLD AUTO: 45.2 % (ref 37.5–51)
HGB BLD-MCNC: 15.5 G/DL (ref 13–17.7)
HGB UR QL STRIP.AUTO: NEGATIVE
IMM GRANULOCYTES # BLD AUTO: 0.04 10*3/MM3 (ref 0–0.05)
IMM GRANULOCYTES NFR BLD AUTO: 0.5 % (ref 0–0.5)
KETONES UR QL STRIP: NEGATIVE
LEUKOCYTE ESTERASE UR QL STRIP.AUTO: NEGATIVE
LIPASE SERPL-CCNC: 16 U/L (ref 13–60)
LYMPHOCYTES # BLD AUTO: 1.66 10*3/MM3 (ref 0.7–3.1)
LYMPHOCYTES NFR BLD AUTO: 21.8 % (ref 19.6–45.3)
MAGNESIUM SERPL-MCNC: 1.8 MG/DL (ref 1.6–2.6)
MCH RBC QN AUTO: 28.6 PG (ref 26.6–33)
MCHC RBC AUTO-ENTMCNC: 34.3 G/DL (ref 31.5–35.7)
MCV RBC AUTO: 83.4 FL (ref 79–97)
MONOCYTES # BLD AUTO: 0.48 10*3/MM3 (ref 0.1–0.9)
MONOCYTES NFR BLD AUTO: 6.3 % (ref 5–12)
NEUTROPHILS # BLD AUTO: 5.31 10*3/MM3 (ref 1.7–7)
NEUTROPHILS NFR BLD AUTO: 69.7 % (ref 42.7–76)
NITRITE UR QL STRIP: NEGATIVE
NRBC BLD AUTO-RTO: 0 /100 WBC (ref 0–0.2)
PH UR STRIP.AUTO: 7.5 [PH] (ref 5–8)
PLATELET # BLD AUTO: 187 10*3/MM3 (ref 140–450)
PMV BLD AUTO: 9.4 FL (ref 6–12)
POTASSIUM BLD-SCNC: 4.8 MMOL/L (ref 3.5–5.2)
PROT SERPL-MCNC: 7.3 G/DL (ref 6–8.5)
PROT UR QL STRIP: NEGATIVE
RBC # BLD AUTO: 5.42 10*6/MM3 (ref 4.14–5.8)
SODIUM BLD-SCNC: 135 MMOL/L (ref 136–145)
SP GR UR STRIP: 1.01 (ref 1–1.03)
UROBILINOGEN UR QL STRIP: NORMAL
WBC NRBC COR # BLD: 7.62 10*3/MM3 (ref 3.4–10.8)

## 2019-12-05 PROCEDURE — 82962 GLUCOSE BLOOD TEST: CPT

## 2019-12-05 PROCEDURE — 93971 EXTREMITY STUDY: CPT

## 2019-12-05 PROCEDURE — 96375 TX/PRO/DX INJ NEW DRUG ADDON: CPT

## 2019-12-05 PROCEDURE — 80053 COMPREHEN METABOLIC PANEL: CPT | Performed by: PHYSICIAN ASSISTANT

## 2019-12-05 PROCEDURE — 81003 URINALYSIS AUTO W/O SCOPE: CPT | Performed by: PHYSICIAN ASSISTANT

## 2019-12-05 PROCEDURE — 83690 ASSAY OF LIPASE: CPT | Performed by: PHYSICIAN ASSISTANT

## 2019-12-05 PROCEDURE — 96374 THER/PROPH/DIAG INJ IV PUSH: CPT

## 2019-12-05 PROCEDURE — 70450 CT HEAD/BRAIN W/O DYE: CPT | Performed by: RADIOLOGY

## 2019-12-05 PROCEDURE — 70450 CT HEAD/BRAIN W/O DYE: CPT

## 2019-12-05 PROCEDURE — 93005 ELECTROCARDIOGRAM TRACING: CPT | Performed by: PHYSICIAN ASSISTANT

## 2019-12-05 PROCEDURE — 85025 COMPLETE CBC W/AUTO DIFF WBC: CPT | Performed by: PHYSICIAN ASSISTANT

## 2019-12-05 PROCEDURE — 25010000002 PROMETHAZINE PER 50 MG: Performed by: PHYSICIAN ASSISTANT

## 2019-12-05 PROCEDURE — 25010000002 LORAZEPAM PER 2 MG: Performed by: EMERGENCY MEDICINE

## 2019-12-05 PROCEDURE — 99284 EMERGENCY DEPT VISIT MOD MDM: CPT

## 2019-12-05 PROCEDURE — 93971 EXTREMITY STUDY: CPT | Performed by: RADIOLOGY

## 2019-12-05 PROCEDURE — 82150 ASSAY OF AMYLASE: CPT | Performed by: PHYSICIAN ASSISTANT

## 2019-12-05 PROCEDURE — 83735 ASSAY OF MAGNESIUM: CPT | Performed by: PHYSICIAN ASSISTANT

## 2019-12-05 RX ORDER — MECLIZINE HYDROCHLORIDE 25 MG/1
25 TABLET ORAL 3 TIMES DAILY PRN
Qty: 20 TABLET | Refills: 0 | Status: SHIPPED | OUTPATIENT
Start: 2019-12-05

## 2019-12-05 RX ORDER — SODIUM CHLORIDE 0.9 % (FLUSH) 0.9 %
10 SYRINGE (ML) INJECTION AS NEEDED
Status: DISCONTINUED | OUTPATIENT
Start: 2019-12-05 | End: 2019-12-05 | Stop reason: HOSPADM

## 2019-12-05 RX ORDER — LORAZEPAM 2 MG/ML
1 INJECTION INTRAMUSCULAR ONCE
Status: COMPLETED | OUTPATIENT
Start: 2019-12-05 | End: 2019-12-05

## 2019-12-05 RX ORDER — PROMETHAZINE HYDROCHLORIDE 25 MG/ML
12.5 INJECTION, SOLUTION INTRAMUSCULAR; INTRAVENOUS ONCE
Status: COMPLETED | OUTPATIENT
Start: 2019-12-05 | End: 2019-12-05

## 2019-12-05 RX ADMIN — PROMETHAZINE HYDROCHLORIDE 12.5 MG: 25 INJECTION INTRAMUSCULAR; INTRAVENOUS at 11:02

## 2019-12-05 RX ADMIN — SODIUM CHLORIDE 1000 ML: 9 INJECTION, SOLUTION INTRAVENOUS at 10:56

## 2019-12-05 RX ADMIN — LORAZEPAM 1 MG: 2 INJECTION, SOLUTION INTRAMUSCULAR; INTRAVENOUS at 10:57

## 2019-12-05 NOTE — ED PROVIDER NOTES
Subjective   49-year-old male who presents to the ED today due to dizziness.  He states he woke up today at 5 AM and everything was spinning.  He states it is worse with activity and when he moves his head.  He states he has had nausea but no vomiting.  He denies any diarrhea.  He states he has also been having an intermittent sharp pain in his right upper quadrant.  He states when he tries to walk he feels very unbalanced.  He states he has had similar symptoms in the past but has never had a diagnosis.  He denies any recent viral illness.  He states he does have ringing in his right ear today.  He denies any hearing loss.  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  He states he is followed by Dr. Bonilla for this.        History provided by:  Patient  Dizziness   Quality:  Room spinning  Severity:  Severe  Onset quality:  Sudden  Duration:  5 hours  Timing:  Intermittent  Progression:  Waxing and waning  Chronicity:  New  Context: head movement and standing up    Relieved by:  Being still  Worsened by:  Eye movement, standing up and turning head  Ineffective treatments:  None tried  Associated symptoms: nausea and tinnitus    Associated symptoms: no blood in stool, no chest pain, no diarrhea, no headaches, no hearing loss, no palpitations, no shortness of breath, no syncope, no vision changes, no vomiting and no weakness        Review of Systems   Constitutional: Negative for fever.   HENT: Positive for facial swelling and tinnitus. Negative for ear discharge, ear pain and hearing loss.    Eyes: Negative.    Respiratory: Negative for shortness of breath.    Cardiovascular: Negative for chest pain, palpitations and syncope.   Gastrointestinal: Positive for abdominal pain and nausea. Negative for blood in stool, diarrhea and vomiting.   Genitourinary: Negative.     Musculoskeletal: Negative.    Skin: Negative.    Neurological: Positive for dizziness. Negative for syncope, weakness, light-headedness and headaches.   Psychiatric/Behavioral: Negative.    All other systems reviewed and are negative.      Past Medical History:   Diagnosis Date   • Cancer (CMS/HCC)    • Degenerative joint disease    • Diabetes mellitus (CMS/HCC)    • DVT, lower extremity (CMS/HCC)     left    • Hypertension    • Peroneal DVT (deep venous thrombosis)     in  and dvt    • Spinal stenosis    • Urolithiasis        No Known Allergies    Past Surgical History:   Procedure Laterality Date   • COLONOSCOPY     • CYSTOSCOPY W/ LITHOLAPAXY / [facility]     • INNER EAR SURGERY     • OTHER SURGICAL HISTORY      Mass removed from left side of face       Family History   Problem Relation Age of Onset   • Other Other         deveral siblings havd had early onset DVTs   • No Known Problems Mother    • No Known Problems Father        Social History     Socioeconomic History   • Marital status: Single     Spouse name: Not on file   • Number of children: Not on file   • Years of education: Not on file   • Highest education level: Not on file   Tobacco Use   • Smoking status: Former Smoker     Years: 20.00     Last attempt to quit:      Years since quittin.9   • Smokeless tobacco: Current User     Types: Chew   • Tobacco comment: occasionally uses chew once a year    Substance and Sexual Activity   • Alcohol use: No     Comment: beer occasionally   • Drug use: No   • Sexual activity: Defer           Objective   Physical Exam   Constitutional: He is oriented to person, place, and time. He appears well-developed and well-nourished. No distress.   HENT:   Head: Normocephalic and atraumatic.   Right Ear: External ear normal. Tympanic membrane is not perforated, not erythematous and not bulging.   Left Ear: External ear normal. Tympanic membrane is not perforated, not erythematous and not bulging.   Nose:  Nose normal.   Mouth/Throat: Oropharynx is clear and moist.   Large mass to the right parotid gland   Eyes: Conjunctivae and EOM are normal. Pupils are equal, round, and reactive to light.   Symptoms become worse with EOM   Neck: Normal range of motion. Neck supple.   Cardiovascular: Normal rate, regular rhythm, normal heart sounds and intact distal pulses.   Pulmonary/Chest: Effort normal and breath sounds normal.   Abdominal: Soft. Bowel sounds are normal. There is no tenderness. There is no rebound and no guarding.   Musculoskeletal: Normal range of motion.   Neurological: He is alert and oriented to person, place, and time. No cranial nerve deficit or sensory deficit. He exhibits normal muscle tone. Coordination normal.   Skin: Skin is warm and dry. Capillary refill takes less than 2 seconds.   Psychiatric: He has a normal mood and affect. His behavior is normal. Judgment and thought content normal.   Nursing note and vitals reviewed.      Procedures           ED Course  ED Course as of Dec 05 1504   Thu Dec 05, 2019   1108 EKG performed at 10:43  Sinus rhythm with sinus arrhythmia, rate of 81    QTc 522  No acute ischemia   Reviewed by Dr. Camacho  [AH]   1154 Patient has told ED tech that he has also been having pain behind his right knee and does have a history of DVT.  Will get a venous doppler.  [AH]   1224 Impression    No CT evidence of acute intracranial abnormality.     CT Head Without Contrast [AH]   1224 Endorsed to Radhames Horn PA-C  [AH]   1338 Patient sleepy though feeling better after medication.  He has had previous episodes of vertigo in the past.  He is seen by an ear nose and throat in [place].  He is to make arrangements for follow-up with them.  He is been counseled on the signs and symptoms of worsening along with appropriate follow-up.  He voices understanding.  [JI]      ED Course User Index  [AH] Pao Huynh PA  [JI] Aubrey Horn PA                  MDM  Number of Diagnoses or  Management Options  Vertigo: new and requires workup     Amount and/or Complexity of Data Reviewed  Clinical lab tests: reviewed and ordered  Tests in the radiology section of CPT®: reviewed and ordered  Tests in the medicine section of CPT®: reviewed and ordered  Discuss the patient with other providers: yes  Independent visualization of images, tracings, or specimens: yes    Risk of Complications, Morbidity, and/or Mortality  Presenting problems: moderate        Final diagnoses:   Vertigo              Aubrey Horn PA  12/05/19 1503

## 2020-01-28 ENCOUNTER — OFFICE VISIT (OUTPATIENT)
Dept: UROLOGY | Facility: CLINIC | Age: 50
End: 2020-01-28

## 2020-01-28 VITALS — BODY MASS INDEX: 45.1 KG/M2 | HEIGHT: 70 IN | WEIGHT: 315 LBS

## 2020-01-28 DIAGNOSIS — Z98.52 VASECTOMY STATUS: ICD-10-CM

## 2020-01-28 DIAGNOSIS — N42.9 DISORDER OF PROSTATE: ICD-10-CM

## 2020-01-28 DIAGNOSIS — R79.89 LOW TESTOSTERONE IN MALE: Primary | ICD-10-CM

## 2020-01-28 LAB
DEPRECATED RDW RBC AUTO: 42.4 FL (ref 37–54)
ERYTHROCYTE [DISTWIDTH] IN BLOOD BY AUTOMATED COUNT: 13.1 % (ref 12.3–15.4)
ESTRADIOL SERPL HS-MCNC: 54.1 PG/ML
HCT VFR BLD AUTO: 50.4 % (ref 37.5–51)
HGB BLD-MCNC: 16.5 G/DL (ref 13–17.7)
MCH RBC QN AUTO: 29.1 PG (ref 26.6–33)
MCHC RBC AUTO-ENTMCNC: 32.7 G/DL (ref 31.5–35.7)
MCV RBC AUTO: 88.9 FL (ref 79–97)
PLATELET # BLD AUTO: 225 10*3/MM3 (ref 140–450)
PMV BLD AUTO: 10.9 FL (ref 6–12)
PSA SERPL-MCNC: 0.73 NG/ML (ref 0–4)
RBC # BLD AUTO: 5.67 10*6/MM3 (ref 4.14–5.8)
TESTOST SERPL-MCNC: 357 NG/DL (ref 249–836)
WBC NRBC COR # BLD: 6.68 10*3/MM3 (ref 3.4–10.8)

## 2020-01-28 PROCEDURE — 84403 ASSAY OF TOTAL TESTOSTERONE: CPT | Performed by: UROLOGY

## 2020-01-28 PROCEDURE — 82670 ASSAY OF TOTAL ESTRADIOL: CPT | Performed by: UROLOGY

## 2020-01-28 PROCEDURE — 99203 OFFICE O/P NEW LOW 30 MIN: CPT | Performed by: UROLOGY

## 2020-01-28 PROCEDURE — 85027 COMPLETE CBC AUTOMATED: CPT | Performed by: UROLOGY

## 2020-01-28 PROCEDURE — 84153 ASSAY OF PSA TOTAL: CPT | Performed by: UROLOGY

## 2020-01-28 RX ORDER — TESTOSTERONE CYPIONATE 200 MG/ML
INJECTION, SOLUTION INTRAMUSCULAR
Qty: 10 ML | Refills: 2 | Status: SHIPPED | OUTPATIENT
Start: 2020-01-28

## 2020-01-28 RX ORDER — GENTAMICIN SULFATE 80 MG/100ML
80 INJECTION, SOLUTION INTRAVENOUS ONCE
Status: CANCELLED | OUTPATIENT
Start: 2020-03-04 | End: 2020-01-28

## 2020-01-28 RX ORDER — PHENTERMINE HYDROCHLORIDE 37.5 MG/1
37.5 CAPSULE ORAL EVERY MORNING
COMMUNITY

## 2020-01-28 RX ORDER — FLUTICASONE PROPIONATE 50 MCG
2 SPRAY, SUSPENSION (ML) NASAL DAILY
COMMUNITY
End: 2020-03-03

## 2020-01-28 RX ORDER — CYCLOBENZAPRINE HCL 10 MG
10 TABLET ORAL 3 TIMES DAILY PRN
COMMUNITY

## 2020-01-28 NOTE — PROGRESS NOTES
"Chief Complaint:          Chief Complaint   Patient presents with   • Hypogonadism       HPI:   49 y.o. male is referred for resumption of his testosterone treatment.  He has a positive VINCENT-androgen deficiency in the age male questionnaire  The patient was queried regarding the androgen deficiency in the age male questionnaire.  This is a validated questionnaire that was performed on a set of 314 [facility] male physicians when it was positive it correlated directly with a 94% chance of low testosterone.  Patient indicates there is a decrease in libido or sex drive, a lack of energy, Decreased  strength and endurance, a decreased \"enjoyment of life\", sad and grumpy feelings with significant difficulty maintaining erections.  He is also been a recent deterioration regarding work performance.  Currently has been getting 1 cc every 2 weeks.  We discussed the situation at length his last level was 428 after he has been on testosterone.  He was managed by Dr. Torres.  He is also desirous of a scrotal vasectomy.  He has had multiple lithotripsies in [facility] and [place].  He has erectile dysfunction he had problems with polycythemia.  He is interested in a vasectomy.  Patient presents for consideration of an elective scrotal vasectomy.  I discussed the procedure at length.  I discussed the fact that it has a risk of anesthesia, bleeding, infection, testicular pain postoperatively, a failure in the range of 1 in 10,000.  The important necessity to have a follow-up in about 8 weeks after the original procedure to be sure that the semen specimen is free of spermatozoa thus ensuring sterility.  I discussed the various forms out there including a 1 incision, 2 incision technique as well as the after extensive discussion they wish to proceed given his body habitus and the fact that testes are not easily palpable I would recommend this be done in the operating suite.      Past Medical History:        Past Medical " History:   Diagnosis Date   • Cancer (CMS/[facility])    • Degenerative joint disease    • Diabetes mellitus (CMS/HCC)    • DVT, lower extremity (CMS/[facility])     left    • Hypertension    • Peroneal DVT (deep venous thrombosis)     in  and dvt    • Spinal stenosis    • Urolithiasis          Current Meds:     Current Outpatient Medications   Medication Sig Dispense Refill   • butalbital-acetaminophen-caffeine (FIORICET, ESGIC) -40 MG per tablet      • cyclobenzaprine (FLEXERIL) 10 MG tablet Take 10 mg by mouth 3 (Three) Times a Day As Needed for Muscle Spasms.     • fluticasone (FLONASE) 50 MCG/ACT nasal spray 2 sprays into the nostril(s) as directed by provider Daily.     • gabapentin (NEURONTIN) 800 MG tablet 4 (Four) Times a Day.     • meclizine (ANTIVERT) 25 MG tablet Take 1 tablet by mouth 3 (Three) Times a Day As Needed for Dizziness. 20 tablet 0   • omeprazole (PriLOSEC) 20 MG capsule      • oxyCODONE-acetaminophen (PERCOCET)  MG per tablet Take 1 tablet by mouth Every 6 (Six) Hours As Needed.     • phentermine (ADIPEX-P) 37.5 MG capsule Take 37.5 mg by mouth Every Morning.       No current facility-administered medications for this visit.         Allergies:      No Known Allergies     Past Surgical History:     Past Surgical History:   Procedure Laterality Date   • COLONOSCOPY     • CYSTOSCOPY W/ LITHOLAPAXY / [facility]     • INNER EAR SURGERY     • OTHER SURGICAL HISTORY      Mass removed from left side of face         Social History:     Social History     Socioeconomic History   • Marital status: Single     Spouse name: Not on file   • Number of children: Not on file   • Years of education: Not on file   • Highest education level: Not on file   Tobacco Use   • Smoking status: Former Smoker     Years: 20.00     Last attempt to quit:      Years since quittin.0   • Smokeless tobacco: Current User     Types: Chew   • Tobacco comment: occasionally uses chew once a year    Substance and Sexual  Activity   • Alcohol use: No     Comment: beer occasionally   • Drug use: No   • Sexual activity: Defer       Family History:     Family History   Problem Relation Age of Onset   • Other Other         deveral siblings tatyana had early onset DVTs   • No Known Problems Mother    • No Known Problems Father        Review of Systems:     Review of Systems   Constitutional: Negative.    HENT: Negative.    Eyes: Negative.    Respiratory: Negative.    Cardiovascular: Negative.    Gastrointestinal: Negative.    Endocrine: Negative.    Musculoskeletal: Negative.    Allergic/Immunologic: Negative.    Neurological: Negative.    Hematological: Negative.    Psychiatric/Behavioral: Negative.        Physical Exam:     Physical Exam   Constitutional: He is oriented to person, place, and time. He appears well-developed and well-nourished.   HENT:   Head: Normocephalic and atraumatic.   Eyes: Pupils are equal, round, and reactive to light. Conjunctivae and EOM are normal.   Neck: Normal range of motion.   Cardiovascular: Normal rate, regular rhythm, normal heart sounds and intact distal pulses.   Pulmonary/Chest: Effort normal and breath sounds normal.   Abdominal: Soft. Bowel sounds are normal.   Genitourinary: Penis normal.   Musculoskeletal: Normal range of motion.   Neurological: He is alert and oriented to person, place, and time. He has normal reflexes.   Skin: Skin is warm and dry.   Psychiatric: He has a normal mood and affect. His behavior is normal. Judgment and thought content normal.   Nursing note and vitals reviewed.      I have reviewed the following portions of the patient's history: allergies, current medications, past family history, past medical history, past social history, past surgical history, problem list and ROS and confirm it's accurate.      Procedure:       Assessment/Plan:   Vasectomy status-we will set up an elective scrotal vasectomy  Low TestosteroneThis pleasant male patient presents today with signs and  symptoms that are consistent with low testosterone he has positive Catrachito questionnaire by history this includes both the sexual and nonsexual side effects.  Sexual side effects include inability to achieve and maintain an erection, in ability to maintain his erection and decreased interest and sexual activity.  Nonsexual symptomatology includes fatigue, difficulty completing a job, tiredness.  He has a discussion of the various forms testosterone available including parenteral, topical, and the form of a patch.  We discussed the efficacy of the gels, and the injections.  As well as the cost and benefits analysis.  We discussed the the studies a talked about heart disease and its effect on prostate cancer both of which are negligible.  He gives verbal consent to proceed with treatment.  He understands the risks and benefits of length he also completed his attempts at fertility he understands the partial effect on spermatogenesis            Patient's Body mass index is 46.35 kg/m². BMI is above normal parameters. Recommendations include: educational material.              This document has been electronically signed by JOSE A SILVEIRA MD January 28, 2020 9:08 AM

## 2020-02-06 ENCOUNTER — TELEPHONE (OUTPATIENT)
Dept: UROLOGY | Facility: CLINIC | Age: 50
End: 2020-02-06

## 2020-02-12 ENCOUNTER — HOSPITAL ENCOUNTER (EMERGENCY)
Facility: HOSPITAL | Age: 50
Discharge: HOME | End: 2020-02-12
Attending: EMERGENCY MEDICINE | Admitting: EMERGENCY MEDICINE

## 2020-02-12 ENCOUNTER — HOSPITAL ENCOUNTER (EMERGENCY)
Facility: HOSPITAL | Age: 50
Discharge: HOME | End: 2020-02-13
Admitting: FAMILY MEDICINE

## 2020-02-12 VITALS
WEIGHT: 312 LBS | BODY MASS INDEX: 44.67 KG/M2 | TEMPERATURE: 98.3 F | HEART RATE: 108 BPM | OXYGEN SATURATION: 95 % | HEIGHT: 70 IN | SYSTOLIC BLOOD PRESSURE: 153 MMHG | DIASTOLIC BLOOD PRESSURE: 104 MMHG | RESPIRATION RATE: 18 BRPM

## 2020-02-12 DIAGNOSIS — M54.41 ACUTE RIGHT-SIDED LOW BACK PAIN WITH RIGHT-SIDED SCIATICA: Primary | ICD-10-CM

## 2020-02-12 DIAGNOSIS — M53.3 SACRO-ILIAC PAIN: Primary | ICD-10-CM

## 2020-02-12 PROCEDURE — 99283 EMERGENCY DEPT VISIT LOW MDM: CPT

## 2020-02-12 PROCEDURE — 25010000002 DEXAMETHASONE PER 1 MG: Performed by: NURSE PRACTITIONER

## 2020-02-12 PROCEDURE — 99282 EMERGENCY DEPT VISIT SF MDM: CPT

## 2020-02-12 PROCEDURE — 96372 THER/PROPH/DIAG INJ SC/IM: CPT

## 2020-02-12 PROCEDURE — 25010000002 PROMETHAZINE PER 50 MG: Performed by: NURSE PRACTITIONER

## 2020-02-12 PROCEDURE — 25010000002 HYDROMORPHONE 1 MG/ML SOLUTION: Performed by: NURSE PRACTITIONER

## 2020-02-12 RX ORDER — PROMETHAZINE HYDROCHLORIDE 25 MG/ML
12.5 INJECTION, SOLUTION INTRAMUSCULAR; INTRAVENOUS ONCE
Status: COMPLETED | OUTPATIENT
Start: 2020-02-12 | End: 2020-02-12

## 2020-02-12 RX ORDER — DEXAMETHASONE SODIUM PHOSPHATE 4 MG/ML
8 INJECTION, SOLUTION INTRA-ARTICULAR; INTRALESIONAL; INTRAMUSCULAR; INTRAVENOUS; SOFT TISSUE ONCE
Status: COMPLETED | OUTPATIENT
Start: 2020-02-12 | End: 2020-02-12

## 2020-02-12 RX ADMIN — HYDROMORPHONE HYDROCHLORIDE 2 MG: 1 INJECTION, SOLUTION INTRAMUSCULAR; INTRAVENOUS; SUBCUTANEOUS at 14:22

## 2020-02-12 RX ADMIN — PROMETHAZINE HYDROCHLORIDE 12.5 MG: 25 INJECTION INTRAMUSCULAR; INTRAVENOUS at 14:23

## 2020-02-12 RX ADMIN — DEXAMETHASONE SODIUM PHOSPHATE 8 MG: 4 INJECTION, SOLUTION INTRAMUSCULAR; INTRAVENOUS at 14:12

## 2020-02-12 NOTE — ED PROVIDER NOTES
Subjective     History provided by:  Patient   used: No    Back Pain   Location:  Lumbar spine and sacro-iliac joint  Quality:  Shooting  Radiates to:  R thigh  Pain severity:  Moderate  Pain is:  Same all the time  Onset quality:  Gradual  Duration:  2 days  Timing:  Constant  Progression:  Worsening  Chronicity:  Recurrent  Context: twisting    Context: not emotional stress, not MVA and not occupational injury    Relieved by:  Nothing  Worsened by:  Bending, twisting, standing and ambulation  Ineffective treatments:  NSAIDs, OTC medications, muscle relaxants and narcotics  Associated symptoms: leg pain    Associated symptoms: no abdominal pain, no abdominal swelling, no bladder incontinence, no bowel incontinence, no fever, no pelvic pain, no perianal numbness, no tingling, no weakness and no weight loss    Risk factors: no hx of cancer, no lack of exercise, not obese and no steroid use        Review of Systems   Constitutional: Negative.  Negative for fever and weight loss.   HENT: Negative.    Eyes: Negative.    Respiratory: Negative.    Cardiovascular: Negative.    Gastrointestinal: Negative.  Negative for abdominal pain and bowel incontinence.   Endocrine: Negative.    Genitourinary: Negative.  Negative for bladder incontinence and pelvic pain.   Musculoskeletal: Positive for back pain.   Skin: Negative.    Allergic/Immunologic: Negative.    Neurological: Negative.  Negative for tingling and weakness.   Hematological: Negative.    Psychiatric/Behavioral: Negative.        Past Medical History:   Diagnosis Date   • Cancer (CMS/HCC)    • Degenerative joint disease    • Diabetes mellitus (CMS/HCC)    • DVT, lower extremity (CMS/HCC)     left 2013   • Hypertension    • Peroneal DVT (deep venous thrombosis)     in 1998 and dvt 2010   • Spinal stenosis    • Urolithiasis        No Known Allergies    Past Surgical History:   Procedure Laterality Date   • COLONOSCOPY     • CYSTOSCOPY W/ LITHOLAPAXY /  [facility]     • INNER EAR SURGERY     • OTHER SURGICAL HISTORY      Mass removed from left side of face       Family History   Problem Relation Age of Onset   • Other Other         deveral siblings havd had early onset DVTs   • No Known Problems Mother    • No Known Problems Father        Social History     Socioeconomic History   • Marital status: Single     Spouse name: Not on file   • Number of children: Not on file   • Years of education: Not on file   • Highest education level: Not on file   Tobacco Use   • Smoking status: Former Smoker     Years: 20.00     Last attempt to quit:      Years since quittin.1   • Smokeless tobacco: Current User     Types: Chew   • Tobacco comment: occasionally uses chew once a year    Substance and Sexual Activity   • Alcohol use: No     Comment: beer occasionally   • Drug use: No   • Sexual activity: Defer           Objective   Physical Exam   Constitutional: He is oriented to person, place, and time. He appears well-developed and well-nourished.   HENT:   Head: Normocephalic.   Right Ear: External ear normal.   Left Ear: External ear normal.   Mouth/Throat: Oropharynx is clear and moist.   Eyes: Pupils are equal, round, and reactive to light. Conjunctivae and EOM are normal.   Neck: Normal range of motion. Neck supple.   Cardiovascular: Normal rate, regular rhythm, normal heart sounds and intact distal pulses.   Pulmonary/Chest: Effort normal and breath sounds normal.   Abdominal: Soft. Bowel sounds are normal.   Musculoskeletal:        Lumbar back: He exhibits decreased range of motion, tenderness and pain.   Neurological: He is alert and oriented to person, place, and time.   Skin: Skin is warm and dry. Capillary refill takes less than 2 seconds.   Psychiatric: He has a normal mood and affect. His behavior is normal. Thought content normal.   Nursing note and vitals reviewed.      Procedures           ED Course                                           MDM    Final  diagnoses:   Acute right-sided low back pain with right-sided sciatica            Camilo Madrid, APRN  02/12/20 2129

## 2020-02-13 ENCOUNTER — APPOINTMENT (OUTPATIENT)
Dept: GENERAL RADIOLOGY | Facility: HOSPITAL | Age: 50
End: 2020-02-13

## 2020-02-13 VITALS
HEIGHT: 70 IN | TEMPERATURE: 97.7 F | OXYGEN SATURATION: 98 % | DIASTOLIC BLOOD PRESSURE: 91 MMHG | SYSTOLIC BLOOD PRESSURE: 139 MMHG | HEART RATE: 113 BPM | RESPIRATION RATE: 18 BRPM | WEIGHT: 312 LBS | BODY MASS INDEX: 44.67 KG/M2

## 2020-02-13 PROCEDURE — 25010000002 ORPHENADRINE CITRATE PER 60 MG: Performed by: PHYSICIAN ASSISTANT

## 2020-02-13 PROCEDURE — 96372 THER/PROPH/DIAG INJ SC/IM: CPT

## 2020-02-13 PROCEDURE — 72110 X-RAY EXAM L-2 SPINE 4/>VWS: CPT

## 2020-02-13 PROCEDURE — 25010000002 KETOROLAC TROMETHAMINE PER 15 MG: Performed by: PHYSICIAN ASSISTANT

## 2020-02-13 PROCEDURE — 25010000002 HYDROMORPHONE 1 MG/ML SOLUTION: Performed by: FAMILY MEDICINE

## 2020-02-13 RX ORDER — ORPHENADRINE CITRATE 30 MG/ML
60 INJECTION INTRAMUSCULAR; INTRAVENOUS ONCE
Status: COMPLETED | OUTPATIENT
Start: 2020-02-13 | End: 2020-02-13

## 2020-02-13 RX ORDER — KETOROLAC TROMETHAMINE 30 MG/ML
30 INJECTION, SOLUTION INTRAMUSCULAR; INTRAVENOUS ONCE
Status: COMPLETED | OUTPATIENT
Start: 2020-02-13 | End: 2020-02-13

## 2020-02-13 RX ADMIN — KETOROLAC TROMETHAMINE 30 MG: 30 INJECTION, SOLUTION INTRAMUSCULAR; INTRAVENOUS at 00:32

## 2020-02-13 RX ADMIN — HYDROMORPHONE HYDROCHLORIDE 1 MG: 1 INJECTION, SOLUTION INTRAMUSCULAR; INTRAVENOUS; SUBCUTANEOUS at 01:29

## 2020-02-13 RX ADMIN — ORPHENADRINE CITRATE 60 MG: 30 INJECTION INTRAMUSCULAR; INTRAVENOUS at 00:32

## 2020-02-13 NOTE — ED PROVIDER NOTES
Subjective     History provided by:  Patient   used: No    Back Pain   Location:  Sacro-iliac joint  Quality:  Aching and burning  Radiates to:  R posterior upper leg  Pain severity:  Moderate  Duration:  8 days  Timing:  Constant  Progression:  Waxing and waning  Chronicity:  Recurrent  Context: not lifting heavy objects, not physical stress and not recent injury    Relieved by: meds given in ED earlier, but now they've worn off; pressure on affected side.  Worsened by:  Ambulation and standing  Associated symptoms: leg pain    Associated symptoms: no abdominal pain, no abdominal swelling, no bladder incontinence, no bowel incontinence, no chest pain, no dysuria, no fever, no numbness, no paresthesias, no pelvic pain, no perianal numbness and no weakness    Risk factors: obesity        Review of Systems   Constitutional: Negative.  Negative for fever.   HENT: Negative.    Respiratory: Negative.    Cardiovascular: Negative.  Negative for chest pain.   Gastrointestinal: Negative.  Negative for abdominal pain and bowel incontinence.   Endocrine: Negative.    Genitourinary: Negative.  Negative for bladder incontinence, dysuria and pelvic pain.   Musculoskeletal: Positive for back pain.   Skin: Negative.    Neurological: Negative.  Negative for weakness, numbness and paresthesias.   Psychiatric/Behavioral: Negative.    All other systems reviewed and are negative.      Past Medical History:   Diagnosis Date   • Cancer (CMS/HCC)    • Degenerative joint disease    • Diabetes mellitus (CMS/HCC)    • DVT, lower extremity (CMS/HCC)     left 2013   • Hypertension    • Peroneal DVT (deep venous thrombosis)     in 1998 and dvt 2010   • Spinal stenosis    • Urolithiasis        No Known Allergies    Past Surgical History:   Procedure Laterality Date   • COLONOSCOPY     • CYSTOSCOPY W/ LITHOLAPAXY / [facility]     • INNER EAR SURGERY     • OTHER SURGICAL HISTORY      Mass removed from left side of face       Family  History   Problem Relation Age of Onset   • Other Other         deveral siblings tatyana had early onset DVTs   • No Known Problems Mother    • No Known Problems Father        Social History     Socioeconomic History   • Marital status: Single     Spouse name: Not on file   • Number of children: Not on file   • Years of education: Not on file   • Highest education level: Not on file   Tobacco Use   • Smoking status: Former Smoker     Years: 20.00     Last attempt to quit:      Years since quittin.1   • Smokeless tobacco: Current User     Types: Chew   • Tobacco comment: occasionally uses chew once a year    Substance and Sexual Activity   • Alcohol use: No     Comment: beer occasionally   • Drug use: No   • Sexual activity: Defer           Objective   Physical Exam   Constitutional: He is oriented to person, place, and time. He appears well-developed and well-nourished. No distress.   HENT:   Head: Normocephalic and atraumatic.   Right Ear: External ear normal.   Left Ear: External ear normal.   Nose: Nose normal.   Eyes: Pupils are equal, round, and reactive to light. Conjunctivae and EOM are normal.   Neck: Normal range of motion. Neck supple. No JVD present. No tracheal deviation present.   Cardiovascular: Normal rate, regular rhythm and normal heart sounds.   No murmur heard.  Pulmonary/Chest: Effort normal and breath sounds normal. No respiratory distress. He has no wheezes.   Abdominal: Soft. Bowel sounds are normal. There is no tenderness.   Musculoskeletal: Normal range of motion. He exhibits no edema or deformity.        Lumbar back: He exhibits tenderness and pain.        Back:    Neurological: He is alert and oriented to person, place, and time. No cranial nerve deficit.   Skin: Skin is warm and dry. No rash noted. He is not diaphoretic. No erythema. No pallor.   Psychiatric: He has a normal mood and affect. His behavior is normal. Thought content normal.   Nursing note and vitals  reviewed.      Procedures           ED Course  ED Course as of Feb 13 0134   Thu Feb 13, 2020   0048 IMPRESSION:   Degenerative changes of the lumbar spine. No acute findings.    Signer Name: Yovany Moreno MD  Signed: 2/13/2020 12:36 AM  Workstation Name: [email or web address]   [facility]    XR Spine Lumbar Complete 4+VW [TK]   0123 Pt continues to be in pain, states no relief after Toradol and Norflex. Offered CT scan, however states he'd prefer MRI. Would like something for pain and significant other states they will call Dr. Vargas tomorrow to see if they can follow up with for MRI and possible physical therapy referral for SI joint. Offered patient Diclofenac and Norflex RX however he states he has Mobic, Ibuprofen, Zanaflex, Norflex, and Flexeril already and they don't work.    [TK]      ED Course User Index  [TK] Pilar Corona PA-C                                           MDM  Number of Diagnoses or Management Options  Sacro-iliac pain: new and requires workup     Amount and/or Complexity of Data Reviewed  Tests in the radiology section of CPT®: reviewed and ordered    Risk of Complications, Morbidity, and/or Mortality  Presenting problems: moderate  Diagnostic procedures: moderate  Management options: moderate    Patient Progress  Patient progress: stable      Final diagnoses:   Sacro-iliac pain            Pilar Corona PA-C  02/13/20 0134

## 2020-03-03 ENCOUNTER — APPOINTMENT (OUTPATIENT)
Dept: PREADMISSION TESTING | Facility: HOSPITAL | Age: 50
End: 2020-03-03

## 2020-03-03 LAB
ANION GAP SERPL CALCULATED.3IONS-SCNC: 12.1 MMOL/L (ref 5–15)
BUN BLD-MCNC: 8 MG/DL (ref 6–20)
BUN/CREAT SERPL: 9.8 (ref 7–25)
CALCIUM SPEC-SCNC: 9.1 MG/DL (ref 8.6–10.5)
CHLORIDE SERPL-SCNC: 101 MMOL/L (ref 98–107)
CO2 SERPL-SCNC: 25.9 MMOL/L (ref 22–29)
CREAT BLD-MCNC: 0.82 MG/DL (ref 0.76–1.27)
DEPRECATED RDW RBC AUTO: 37.4 FL (ref 37–54)
ERYTHROCYTE [DISTWIDTH] IN BLOOD BY AUTOMATED COUNT: 12.1 % (ref 12.3–15.4)
GFR SERPL CREATININE-BSD FRML MDRD: 101 ML/MIN/1.73
GLUCOSE BLD-MCNC: 168 MG/DL (ref 65–99)
HCT VFR BLD AUTO: 52.1 % (ref 37.5–51)
HGB BLD-MCNC: 17.2 G/DL (ref 13–17.7)
MCH RBC QN AUTO: 28.2 PG (ref 26.6–33)
MCHC RBC AUTO-ENTMCNC: 33 G/DL (ref 31.5–35.7)
MCV RBC AUTO: 85.3 FL (ref 79–97)
PLATELET # BLD AUTO: 207 10*3/MM3 (ref 140–450)
PMV BLD AUTO: 9.8 FL (ref 6–12)
POTASSIUM BLD-SCNC: 4.1 MMOL/L (ref 3.5–5.2)
RBC # BLD AUTO: 6.11 10*6/MM3 (ref 4.14–5.8)
SODIUM BLD-SCNC: 139 MMOL/L (ref 136–145)
WBC NRBC COR # BLD: 7.46 10*3/MM3 (ref 3.4–10.8)

## 2020-03-03 PROCEDURE — 80048 BASIC METABOLIC PNL TOTAL CA: CPT | Performed by: ANESTHESIOLOGY

## 2020-03-03 PROCEDURE — 36415 COLL VENOUS BLD VENIPUNCTURE: CPT

## 2020-03-03 PROCEDURE — 85027 COMPLETE CBC AUTOMATED: CPT | Performed by: ANESTHESIOLOGY

## 2020-03-03 RX ORDER — TIZANIDINE 4 MG/1
4 TABLET ORAL NIGHTLY PRN
COMMUNITY

## 2020-03-03 NOTE — DISCHARGE INSTRUCTIONS
3/04/2020+   ARRIVAL TIME PER DR OFFICE    TAKE the following medications the morning of surgery:  All heart or blood pressure medications    HOLD all diabetic medications the morning of surgery as ordered by physician.    Please discontinue all blood thinners and anticoagulants (except aspirin) prior to surgery as per your surgeon and cardiologist instructions.  Aspirin may be continued up to the day prior to surgery.         General Instructions:  · Do not eat or drink after midnight: includes water, mints, or gum. You may brush your teeth.  Dental appliances that are removable must be taken out day of surgery.  · Do not smoke, chew tobacco, or drink alcohol.  · Bring medications in original bottles, any inhalers and if applicable your C-PAP/BI-PAP machine.  · Bring any papers given to you in the doctor's office.  · Wear clean comfortable clothes and socks.  · Do not wear contact lenses or make-up. Bring a case for your glasses if applicable.  · Bring crutches or walker if applicable.  · Leave all other valuables and jewelry at home.    If you were given a blood bank ID arm band remember to bring it with you the day of surgery.    Preventing a Surgical Site Infection:  Shower the night before surgery (unless instructed other wise) using a fresh bar of anti-bacterial soap (such as Dial) and clean washcloth. Dry with a clean towel and dress in clean clothing.  For 2 to 3 days before surgery, avoid shaving with a razor near where you will have surgery because the razor can irritate skin and make it easier to develop an infection. Ask your surgeon if you will be receiving antibiotics prior to surgery.  Make sure you, your family, and all healthcare providers clear their hands with soap and water or an alcohol-based hand  before caring for you or your wound.  If at all possible, quit smoking as many days before surgery as you can.    Day of surgery:  Upon arrival, a Pre-op nurse and Anesthesiologist will  review your health history, obtain vital signs, and answer questions you may have. The only belongings needed at this time will be your home medications and if applicable your C-PAP/BI-PAP machine. If you are staying overnight your family can leave the rest of your belongings in the car and bring them to your room later. A Pre-op nurse will start an IV and you may receive medication in preparation for surgery, including something to help you relax. Your family will be able to see you in the Pre-op area. While you are in surgery your family should notify the waiting room  if they leave the waiting room area and provide a contact phone number.    Please be aware that surgery does come with discomfort. We want to make every effort to control your discomfort so please discuss any uncontrolled symptoms with your nurse. Your doctor will most likely have prescribed pain medications.    If you are going home after surgery you will receive individualized written care instructions before being discharged. A responsible adult must drive you to and from the hospital on the day of surgery and stay with you for 24 hours.    If you are staying overnight following surgery, you will be transported to your hospital room following the recovery period.  [facility] has all private rooms.    If you have any questions please call Pre-Admission Testing at [phone].  Deductibles and co-payments are collected on the day of service. Please be prepared to pay the required co-pay, deductible or deposit on the day of service as defined by your plan.    A RESPONSIBLE PERSON MUST REMAIN IN THE WAITING ROOM DURING YOUR PROCEDURE AND A RESPONSIBLE  MUST BE AVAILABLE UPON YOUR DISCHARGE.

## 2020-03-04 ENCOUNTER — ANESTHESIA EVENT (OUTPATIENT)
Dept: PERIOP | Facility: HOSPITAL | Age: 50
End: 2020-03-04

## 2020-03-04 ENCOUNTER — HOSPITAL ENCOUNTER (OUTPATIENT)
Facility: HOSPITAL | Age: 50
Discharge: HOME | End: 2020-03-04
Attending: UROLOGY | Admitting: UROLOGY

## 2020-03-04 ENCOUNTER — ANESTHESIA (OUTPATIENT)
Dept: PERIOP | Facility: HOSPITAL | Age: 50
End: 2020-03-04

## 2020-03-04 VITALS
DIASTOLIC BLOOD PRESSURE: 95 MMHG | WEIGHT: 314 LBS | OXYGEN SATURATION: 97 % | HEIGHT: 70 IN | TEMPERATURE: 98 F | SYSTOLIC BLOOD PRESSURE: 165 MMHG | RESPIRATION RATE: 16 BRPM | HEART RATE: 90 BPM | BODY MASS INDEX: 44.95 KG/M2

## 2020-03-04 DIAGNOSIS — R79.89 LOW TESTOSTERONE IN MALE: ICD-10-CM

## 2020-03-04 DIAGNOSIS — Z98.52 VASECTOMY STATUS: Primary | ICD-10-CM

## 2020-03-04 LAB — GLUCOSE BLDC GLUCOMTR-MCNC: 141 MG/DL (ref 70–130)

## 2020-03-04 PROCEDURE — 25010000002 PROPOFOL 10 MG/ML EMULSION: Performed by: NURSE ANESTHETIST, CERTIFIED REGISTERED

## 2020-03-04 PROCEDURE — 82962 GLUCOSE BLOOD TEST: CPT

## 2020-03-04 PROCEDURE — 55250 REMOVAL OF SPERM DUCT(S): CPT | Performed by: UROLOGY

## 2020-03-04 PROCEDURE — S0260 H&P FOR SURGERY: HCPCS | Performed by: UROLOGY

## 2020-03-04 PROCEDURE — 25010000002 FENTANYL CITRATE (PF) 100 MCG/2ML SOLUTION: Performed by: NURSE ANESTHETIST, CERTIFIED REGISTERED

## 2020-03-04 PROCEDURE — 25010000002 MIDAZOLAM PER 1 MG: Performed by: NURSE ANESTHETIST, CERTIFIED REGISTERED

## 2020-03-04 PROCEDURE — 25010000002 GENTAMICIN PER 80 MG: Performed by: UROLOGY

## 2020-03-04 PROCEDURE — 25010000002 ONDANSETRON PER 1 MG: Performed by: NURSE ANESTHETIST, CERTIFIED REGISTERED

## 2020-03-04 RX ORDER — PROPOFOL 10 MG/ML
VIAL (ML) INTRAVENOUS AS NEEDED
Status: DISCONTINUED | OUTPATIENT
Start: 2020-03-04 | End: 2020-03-04 | Stop reason: SURG

## 2020-03-04 RX ORDER — LIDOCAINE HYDROCHLORIDE 20 MG/ML
INJECTION, SOLUTION INFILTRATION; PERINEURAL AS NEEDED
Status: DISCONTINUED | OUTPATIENT
Start: 2020-03-04 | End: 2020-03-04 | Stop reason: SURG

## 2020-03-04 RX ORDER — BACITRACIN, NEOMYCIN, POLYMYXIN B 400; 3.5; 5 [USP'U]/G; MG/G; [USP'U]/G
OINTMENT TOPICAL AS NEEDED
Status: DISCONTINUED | OUTPATIENT
Start: 2020-03-04 | End: 2020-03-04 | Stop reason: HOSPADM

## 2020-03-04 RX ORDER — FENTANYL CITRATE 50 UG/ML
INJECTION, SOLUTION INTRAMUSCULAR; INTRAVENOUS AS NEEDED
Status: DISCONTINUED | OUTPATIENT
Start: 2020-03-04 | End: 2020-03-04 | Stop reason: SURG

## 2020-03-04 RX ORDER — ONDANSETRON 2 MG/ML
4 INJECTION INTRAMUSCULAR; INTRAVENOUS AS NEEDED
Status: DISCONTINUED | OUTPATIENT
Start: 2020-03-04 | End: 2020-03-04 | Stop reason: HOSPADM

## 2020-03-04 RX ORDER — FAMOTIDINE 10 MG/ML
INJECTION, SOLUTION INTRAVENOUS AS NEEDED
Status: DISCONTINUED | OUTPATIENT
Start: 2020-03-04 | End: 2020-03-04 | Stop reason: SURG

## 2020-03-04 RX ORDER — CEPHALEXIN 500 MG/1
500 CAPSULE ORAL 2 TIMES DAILY
Qty: 8 CAPSULE | Refills: 0 | Status: SHIPPED | OUTPATIENT
Start: 2020-03-04 | End: 2020-03-08

## 2020-03-04 RX ORDER — OXYCODONE AND ACETAMINOPHEN 10; 325 MG/1; MG/1
1 TABLET ORAL EVERY 4 HOURS PRN
Qty: 12 TABLET | Refills: 0 | Status: SHIPPED | OUTPATIENT
Start: 2020-03-04

## 2020-03-04 RX ORDER — SODIUM CHLORIDE 0.9 % (FLUSH) 0.9 %
10 SYRINGE (ML) INJECTION EVERY 12 HOURS SCHEDULED
Status: DISCONTINUED | OUTPATIENT
Start: 2020-03-04 | End: 2020-03-04 | Stop reason: HOSPADM

## 2020-03-04 RX ORDER — SODIUM CHLORIDE 0.9 % (FLUSH) 0.9 %
10 SYRINGE (ML) INJECTION AS NEEDED
Status: DISCONTINUED | OUTPATIENT
Start: 2020-03-04 | End: 2020-03-04 | Stop reason: HOSPADM

## 2020-03-04 RX ORDER — GENTAMICIN SULFATE 80 MG/100ML
80 INJECTION, SOLUTION INTRAVENOUS ONCE
Status: COMPLETED | OUTPATIENT
Start: 2020-03-04 | End: 2020-03-04

## 2020-03-04 RX ORDER — ONDANSETRON 2 MG/ML
INJECTION INTRAMUSCULAR; INTRAVENOUS AS NEEDED
Status: DISCONTINUED | OUTPATIENT
Start: 2020-03-04 | End: 2020-03-04 | Stop reason: SURG

## 2020-03-04 RX ORDER — MEPERIDINE HYDROCHLORIDE 25 MG/ML
12.5 INJECTION INTRAMUSCULAR; INTRAVENOUS; SUBCUTANEOUS
Status: DISCONTINUED | OUTPATIENT
Start: 2020-03-04 | End: 2020-03-04 | Stop reason: HOSPADM

## 2020-03-04 RX ORDER — MIDAZOLAM HYDROCHLORIDE 1 MG/ML
INJECTION INTRAMUSCULAR; INTRAVENOUS AS NEEDED
Status: DISCONTINUED | OUTPATIENT
Start: 2020-03-04 | End: 2020-03-04 | Stop reason: SURG

## 2020-03-04 RX ORDER — OXYCODONE HYDROCHLORIDE AND ACETAMINOPHEN 5; 325 MG/1; MG/1
1 TABLET ORAL ONCE AS NEEDED
Status: DISCONTINUED | OUTPATIENT
Start: 2020-03-04 | End: 2020-03-04 | Stop reason: HOSPADM

## 2020-03-04 RX ORDER — IPRATROPIUM BROMIDE AND ALBUTEROL SULFATE 2.5; .5 MG/3ML; MG/3ML
3 SOLUTION RESPIRATORY (INHALATION) ONCE AS NEEDED
Status: DISCONTINUED | OUTPATIENT
Start: 2020-03-04 | End: 2020-03-04 | Stop reason: HOSPADM

## 2020-03-04 RX ORDER — FENTANYL CITRATE 50 UG/ML
50 INJECTION, SOLUTION INTRAMUSCULAR; INTRAVENOUS
Status: DISCONTINUED | OUTPATIENT
Start: 2020-03-04 | End: 2020-03-04 | Stop reason: HOSPADM

## 2020-03-04 RX ORDER — SODIUM CHLORIDE, SODIUM LACTATE, POTASSIUM CHLORIDE, CALCIUM CHLORIDE 600; 310; 30; 20 MG/100ML; MG/100ML; MG/100ML; MG/100ML
125 INJECTION, SOLUTION INTRAVENOUS CONTINUOUS
Status: DISCONTINUED | OUTPATIENT
Start: 2020-03-04 | End: 2020-03-04 | Stop reason: HOSPADM

## 2020-03-04 RX ORDER — BUPIVACAINE HYDROCHLORIDE 5 MG/ML
INJECTION, SOLUTION PERINEURAL AS NEEDED
Status: DISCONTINUED | OUTPATIENT
Start: 2020-03-04 | End: 2020-03-04 | Stop reason: HOSPADM

## 2020-03-04 RX ADMIN — GENTAMICIN SULFATE 80 MG: 80 INJECTION, SOLUTION INTRAVENOUS at 09:41

## 2020-03-04 RX ADMIN — MIDAZOLAM HYDROCHLORIDE 2 MG: 1 INJECTION, SOLUTION INTRAMUSCULAR; INTRAVENOUS at 09:41

## 2020-03-04 RX ADMIN — FAMOTIDINE 20 MG: 10 INJECTION INTRAVENOUS at 09:41

## 2020-03-04 RX ADMIN — FENTANYL CITRATE 50 MCG: 50 INJECTION INTRAMUSCULAR; INTRAVENOUS at 09:45

## 2020-03-04 RX ADMIN — PROPOFOL 200 MG: 10 INJECTION, EMULSION INTRAVENOUS at 09:45

## 2020-03-04 RX ADMIN — SODIUM CHLORIDE, POTASSIUM CHLORIDE, SODIUM LACTATE AND CALCIUM CHLORIDE 125 ML/HR: 600; 310; 30; 20 INJECTION, SOLUTION INTRAVENOUS at 08:48

## 2020-03-04 RX ADMIN — FENTANYL CITRATE 50 MCG: 50 INJECTION INTRAMUSCULAR; INTRAVENOUS at 09:51

## 2020-03-04 RX ADMIN — LIDOCAINE HYDROCHLORIDE 60 MG: 20 INJECTION, SOLUTION INFILTRATION; PERINEURAL at 09:45

## 2020-03-04 RX ADMIN — ONDANSETRON 4 MG: 2 INJECTION INTRAMUSCULAR; INTRAVENOUS at 09:51

## 2020-03-04 NOTE — ANESTHESIA POSTPROCEDURE EVALUATION
Patient: Alek Munoz    Procedure Summary     Date:  03/04/20 Room / Location:  [facility] OR  /  [facility] OR    Anesthesia Start:  0941 Anesthesia Stop:  1021    Procedure:  VASECTOMY (Bilateral Scrotum) Diagnosis:       Low testosterone in male      (Low testosterone in male [R79.89])    Surgeon:  Angel Adkins MD Provider:  Gareth Rodriguez MD    Anesthesia Type:  general ASA Status:  3          Anesthesia Type: general    Vitals  Vitals Value Taken Time   /76 3/4/2020 10:23 AM   Temp 97.6 °F (36.4 °C) 3/4/2020 10:23 AM   Pulse 112 3/4/2020 10:23 AM   Resp 11 3/4/2020 10:23 AM   SpO2 98 % 3/4/2020 10:23 AM           Post Anesthesia Care and Evaluation    Patient location during evaluation: PHASE II  Patient participation: complete - patient participated  Level of consciousness: awake and alert  Pain score: 1  Pain management: adequate  Airway patency: patent  Anesthetic complications: No anesthetic complications  PONV Status: controlled  Cardiovascular status: acceptable  Respiratory status: acceptable  Hydration status: acceptable

## 2020-03-04 NOTE — ANESTHESIA PREPROCEDURE EVALUATION
" Anesthesia Evaluation     Patient summary reviewed and Nursing notes reviewed   no history of anesthetic complications:  NPO Solid Status: > 8 hours  NPO Liquid Status: > 8 hours           Airway   Mallampati: II  TM distance: >3 FB  Neck ROM: full  No difficulty expected  Dental - normal exam   (+) poor dentition    Pulmonary - normal exam   (+) pulmonary embolism, a smoker (chews) Former,   Cardiovascular - normal exam  Exercise tolerance: good (4-7 METS)    NYHA Classification: II    (+) DVT resolved,   (-) hypertension (?\"white coat\")      Neuro/Psych- negative ROS  GI/Hepatic/Renal/Endo    (+) morbid obesity, GERD,  renal disease stones, diabetes mellitus type 2,     Musculoskeletal     (+) back pain,   Abdominal  - normal exam    Bowel sounds: normal.   Substance History - negative use     OB/GYN negative ob/gyn ROS         Other   arthritis,    history of cancer active                  Anesthesia Plan    ASA 3     general     intravenous induction     Anesthetic plan, all risks, benefits, and alternatives have been provided, discussed and informed consent has been obtained with: patient.    Plan discussed with CRNA.      "

## 2020-03-04 NOTE — H&P
"49 y.o. male is referred for resumption of his testosterone treatment.  He has a positive VINCENT-androgen deficiency in the age male questionnaire  The patient was queried regarding the androgen deficiency in the age male questionnaire.  This is a validated questionnaire that was performed on a set of 314 [place] male physicians when it was positive it correlated directly with a 94% chance of low testosterone.  Patient indicates there is a decrease in libido or sex drive, a lack of energy, Decreased  strength and endurance, a decreased \"enjoyment of life\", sad and grumpy feelings with significant difficulty maintaining erections.  He is also been a recent deterioration regarding work performance.  Currently has been getting 1 cc every 2 weeks.  We discussed the situation at length his last level was 428 after he has been on testosterone.  He was managed by Dr. Torres.  He is also desirous of a scrotal vasectomy.  He has had multiple lithotripsies in [facility] and [place].  He has erectile dysfunction he had problems with polycythemia.  He is interested in a vasectomy.  Patient presents for consideration of an elective scrotal vasectomy.  I discussed the procedure at length.  I discussed the fact that it has a risk of anesthesia, bleeding, infection, testicular pain postoperatively, a failure in the range of 1 in 10,000.  The important necessity to have a follow-up in about 8 weeks after the original procedure to be sure that the semen specimen is free of spermatozoa thus ensuring sterility.  I discussed the various forms out there including a 1 incision, 2 incision technique as well as the after extensive discussion they wish to proceed given his body habitus and the fact that testes are not easily palpable I would recommend this be done in the operating suite.        Past Medical History:         Medical History        Past Medical History:   Diagnosis Date   • Cancer (CMS/HCC)     • Degenerative joint " disease     • Diabetes mellitus (CMS/HCC)     • DVT, lower extremity (CMS/[facility])       left    • Hypertension     • Peroneal DVT (deep venous thrombosis)       in  and dvt    • Spinal stenosis     • Urolithiasis                 Current Meds:      Current Medications          Current Outpatient Medications   Medication Sig Dispense Refill   • butalbital-acetaminophen-caffeine (FIORICET, ESGIC) -40 MG per tablet         • cyclobenzaprine (FLEXERIL) 10 MG tablet Take 10 mg by mouth 3 (Three) Times a Day As Needed for Muscle Spasms.       • fluticasone (FLONASE) 50 MCG/ACT nasal spray 2 sprays into the nostril(s) as directed by provider Daily.       • gabapentin (NEURONTIN) 800 MG tablet 4 (Four) Times a Day.       • meclizine (ANTIVERT) 25 MG tablet Take 1 tablet by mouth 3 (Three) Times a Day As Needed for Dizziness. 20 tablet 0   • omeprazole (PriLOSEC) 20 MG capsule         • oxyCODONE-acetaminophen (PERCOCET)  MG per tablet Take 1 tablet by mouth Every 6 (Six) Hours As Needed.       • phentermine (ADIPEX-P) 37.5 MG capsule Take 37.5 mg by mouth Every Morning.          No current facility-administered medications for this visit.             Allergies:       No Known Allergies     Past Surgical History:      Surgical History         Past Surgical History:   Procedure Laterality Date   • COLONOSCOPY       • CYSTOSCOPY W/ LITHOLAPAXY / EHL       • INNER EAR SURGERY       • OTHER SURGICAL HISTORY         Mass removed from left side of face               Social History:      Social History   Social History            Socioeconomic History   • Marital status: Single       Spouse name: Not on file   • Number of children: Not on file   • Years of education: Not on file   • Highest education level: Not on file   Tobacco Use   • Smoking status: Former Smoker       Years: 20.00       Last attempt to quit:        Years since quittin.0   • Smokeless tobacco: Current User       Types: Chew   •  Tobacco comment: occasionally uses chew once a year    Substance and Sexual Activity   • Alcohol use: No       Comment: beer occasionally   • Drug use: No   • Sexual activity: Defer            Family History:            Family History   Problem Relation Age of Onset   • Other Other           deveral siblings tatyana had early onset DVTs   • No Known Problems Mother     • No Known Problems Father           Review of Systems:      Review of Systems   Constitutional: Negative.    HENT: Negative.    Eyes: Negative.    Respiratory: Negative.    Cardiovascular: Negative.    Gastrointestinal: Negative.    Endocrine: Negative.    Musculoskeletal: Negative.    Allergic/Immunologic: Negative.    Neurological: Negative.    Hematological: Negative.    Psychiatric/Behavioral: Negative.          Physical Exam:      Physical Exam   Constitutional: He is oriented to person, place, and time. He appears well-developed and well-nourished.   HENT:   Head: Normocephalic and atraumatic.   Eyes: Pupils are equal, round, and reactive to light. Conjunctivae and EOM are normal.   Neck: Normal range of motion.   Cardiovascular: Normal rate, regular rhythm, normal heart sounds and intact distal pulses.   Pulmonary/Chest: Effort normal and breath sounds normal.   Abdominal: Soft. Bowel sounds are normal.   Genitourinary: Penis normal.   Musculoskeletal: Normal range of motion.   Neurological: He is alert and oriented to person, place, and time. He has normal reflexes.   Skin: Skin is warm and dry.   Psychiatric: He has a normal mood and affect. His behavior is normal. Judgment and thought content normal.   Nursing note and vitals reviewed.        I have reviewed the following portions of the patient's history: allergies, current medications, past family history, past medical history, past social history, past surgical history, problem list and ROS and confirm it's accurate.        Procedure:         Assessment/Plan:   Vasectomy status-we will set  up an elective scrotal vasectomy  Low TestosteroneThis pleasant male patient presents today with signs and symptoms that are consistent with low testosterone he has positive Catrachito questionnaire by history this includes both the sexual and nonsexual side effects.  Sexual side effects include inability to achieve and maintain an erection, in ability to maintain his erection and decreased interest and sexual activity.  Nonsexual symptomatology includes fatigue, difficulty completing a job, tiredness.  He has a discussion of the various forms testosterone available including parenteral, topical, and the form of a patch.  We discussed the efficacy of the gels, and the injections.  As well as the cost and benefits analysis.  We discussed the the studies a talked about heart disease and its effect on prostate cancer both of which are negligible.  He gives verbal consent to proceed with treatment.  He understands the risks and benefits of length he also completed his attempts at fertility he understands the partial effect on spermatogenesis                 Patient's Body mass index is 46.35 kg/m². BMI is above normal parameters. Recommendations include: educational material.                   This document

## 2020-03-04 NOTE — OP NOTE
VASECTOMY  Procedure Note    Alek Munoz  3/4/2020    Pre-op Diagnosis:   Low testosterone in male [R79.89]    Post-op Diagnosis:     Post-Op Diagnosis Codes:     * Low testosterone in male [R79.89]    Procedure/CPT® Codes:    49-year-old white male for an elective scrotal vasectomy.  Elective scrotal vasectomy -After an appropriate informed consent including the risk of anesthesia, infection, scrotal hematoma, failure in the range of 1 in 10,000, chronic testalgia,  low testosterone, as well as the other very rare complications identified.  He was brought to the operative suite his scrotum was shaved and prepped in a sterile fashion using Betadine.  Local anesthetic was infiltrated into the midline scrotal raphae. A midline scrotal incision was made and the right vas entrapped.  I anesthetized the spermatic cord and skin using 5 cc of 1% Xylocaine with epinephrine after an adequate period of analgesia I  identified the vas and brought out into the incision.  The fascia was divided.  The vas was then doubly ligated fulgurated and the and was sewn in the sheath away from the proximal end.  Bovie electrocautery had been used to fulgurate the end of both vases as per the AUA guidelines Hemostasis was excellent and it was allowed to fall back in the scrotal sac the identical procedure was done on the contralateral side.  The skin was closed with a 3-0 chromic sutures.  Hemostasis was excellent he was recommended to use ice pack with a shield covering the scrotum to protect it from the ice.  He was given pain medication and antibiotics.  The incision was covered with bacitracin ointment.  The patient will be seen in 8 weeks whereupon we will then check a semen analysis to confirm the absence of spermatozoa and therby declare sterility.  Procedure(s):  VASECTOMY    Surgeon(s):  Angel Adkins MD    Anesthesia: see anesthesia record    Staff:   Circulator: Nataly Granados RN  Scrub Person: Jigna Arizmendi  Pastor    Estimated Blood Loss: none  Urine Voided: * No values recorded between 3/4/2020  9:40 AM and 3/4/2020 10:15 AM *    Specimens:                ID Type Source Tests Collected by Time   A :  Tissue Vas Deferens, Left TISSUE PATHOLOGY EXAM Angel Adkins MD 3/4/2020 0958   B :  Tissue Vas Deferens, Right TISSUE PATHOLOGY EXAM Angel Adkins MD 3/4/2020 0958         Drains: None    Findings: Normal    Blood: N/A    Complications: None    Grafts and Implants: None    Angel Adkins MD     Date: 3/4/2020  Time: 10:18 AM

## 2020-03-04 NOTE — ANESTHESIA PROCEDURE NOTES
Airway  Urgency: elective    Date/Time: 3/4/2020 9:46 AM    General Information and Staff    Patient location during procedure: OR    Indications and Patient Condition    Preoxygenated: yes  Mask difficulty assessment: 0 - not attempted    Final Airway Details  Final airway type: supraglottic airway      Successful airway: Supreme  Size 5    Number of attempts at approach: 1  Assessment: lips, teeth, and gum same as pre-op

## 2020-03-05 LAB
LAB AP CASE REPORT: NORMAL
PATH REPORT.FINAL DX SPEC: NORMAL

## 2020-03-07 ENCOUNTER — APPOINTMENT (OUTPATIENT)
Dept: ULTRASOUND IMAGING | Facility: HOSPITAL | Age: 50
End: 2020-03-07

## 2020-03-07 ENCOUNTER — HOSPITAL ENCOUNTER (EMERGENCY)
Facility: HOSPITAL | Age: 50
Discharge: HOME | End: 2020-03-07
Attending: EMERGENCY MEDICINE | Admitting: EMERGENCY MEDICINE

## 2020-03-07 VITALS
HEIGHT: 70 IN | OXYGEN SATURATION: 93 % | HEART RATE: 98 BPM | TEMPERATURE: 97.9 F | RESPIRATION RATE: 20 BRPM | SYSTOLIC BLOOD PRESSURE: 145 MMHG | DIASTOLIC BLOOD PRESSURE: 91 MMHG | WEIGHT: 314 LBS | BODY MASS INDEX: 44.95 KG/M2

## 2020-03-07 DIAGNOSIS — M79.604 PAIN OF RIGHT LOWER EXTREMITY: Primary | ICD-10-CM

## 2020-03-07 PROCEDURE — 93971 EXTREMITY STUDY: CPT

## 2020-03-07 PROCEDURE — 99282 EMERGENCY DEPT VISIT SF MDM: CPT

## 2020-03-07 NOTE — ED NOTES
Pt left ED at this time, refused discharge vital signs. Received verbal discharge instructions per provider, left without paper copy of discharge instructions. Pt's daughter signed paperwork at this time     Piedad Cantu RN  03/07/20 [id number]

## 2020-03-07 NOTE — ED PROVIDER NOTES
Subjective   Patient is a 49-year-old male with diabetes, GERD, vertigo, neuropathy and history of DVT that presents to the ED with complaints of right calf pain.  He states he just recently underwent vasectomy and developed right calf pain today.  He states he has a history of DVT with his last one being in 2010.  He is concerned he may have another clot and is requesting ultrasound to evaluate further.  He denies chest pain, shortness of breath, cough, nausea, vomiting, abdominal pain, headache, dizziness, diarrhea, or other associated symptoms.      History provided by:  Patient   used: No    Leg Swelling   Location:  Right calf  Severity:  Mild  Onset quality:  Sudden  Duration:  1 day  Timing:  Constant  Progression:  Worsening  Chronicity:  New  Context:  C/o mild calf pain  Associated symptoms: myalgias    Associated symptoms: no abdominal pain, no chest pain, no congestion, no cough, no diarrhea, no ear pain, no fever, no headaches, no loss of consciousness, no nausea, no rhinorrhea, no shortness of breath, no sore throat, no vomiting and no wheezing        Review of Systems   Constitutional: Negative.  Negative for fever.   HENT: Negative.  Negative for congestion, ear discharge, ear pain, facial swelling, rhinorrhea, sinus pressure, sinus pain, sneezing, sore throat, tinnitus and trouble swallowing.    Eyes: Negative.  Negative for pain, discharge, redness and itching.   Respiratory: Negative.  Negative for cough, chest tightness, shortness of breath and wheezing.    Cardiovascular: Negative.  Negative for chest pain.   Gastrointestinal: Negative.  Negative for abdominal pain, diarrhea, nausea and vomiting.   Endocrine: Negative.    Genitourinary: Negative.  Negative for dysuria, flank pain, frequency and urgency.   Musculoskeletal: Positive for myalgias.   Skin: Negative.    Neurological: Negative.  Negative for dizziness, loss of consciousness, syncope, weakness, light-headedness,  numbness and headaches.   Hematological: Negative.    Psychiatric/Behavioral: Negative.    All other systems reviewed and are negative.      Past Medical History:   Diagnosis Date   • Back pain    • Cancer (CMS/HCC)     PAROTID CANCER   • Degenerative joint disease    • Diabetes mellitus (CMS/HCC)    • DVT, lower extremity (CMS/HCC)     left    • GERD (gastroesophageal reflux disease)    • Kidney stones    • Neuropathy     FEET    • Peroneal DVT (deep venous thrombosis)     in  and dvt    • Pulmonary embolism (CMS/HCC)    • Spinal stenosis    • Urolithiasis    • Vertigo     OCASSIONALLY       No Known Allergies    Past Surgical History:   Procedure Laterality Date   • COLONOSCOPY     • CYSTOSCOPY W/ LITHOLAPAXY / EHL     • INNER EAR SURGERY     • OTHER SURGICAL HISTORY      Mass removed from left side of face   • VASECTOMY Bilateral 3/4/2020    Procedure: VASECTOMY;  Surgeon: Angel Adkins MD;  Location: [facility];  Service: Urology;  Laterality: Bilateral;       Family History   Problem Relation Age of Onset   • Other Other         deveral siblings havd had early onset DVTs   • No Known Problems Mother    • No Known Problems Father        Social History     Socioeconomic History   • Marital status:      Spouse name: Not on file   • Number of children: Not on file   • Years of education: Not on file   • Highest education level: Not on file   Tobacco Use   • Smoking status: Former Smoker     Years: 20.00     Last attempt to quit:      Years since quittin.1   • Smokeless tobacco: Current User     Types: Chew   • Tobacco comment: occasionally uses chew once a year    Substance and Sexual Activity   • Alcohol use: No     Comment: beer occasionally   • Drug use: No   • Sexual activity: Defer           Objective   Physical Exam   Constitutional: He is oriented to person, place, and time. He appears well-developed and well-nourished. No distress.   HENT:   Head: Normocephalic and  atraumatic.   Right Ear: External ear normal.   Left Ear: External ear normal.   Nose: Nose normal.   Eyes: Pupils are equal, round, and reactive to light. Conjunctivae and EOM are normal.   Neck: Normal range of motion. Neck supple. No JVD present. No tracheal deviation present.   Cardiovascular: Normal rate, regular rhythm and normal heart sounds.   No murmur heard.  Pulmonary/Chest: Effort normal and breath sounds normal. No respiratory distress. He has no wheezes.   Abdominal: Soft. Bowel sounds are normal. There is no tenderness.   Musculoskeletal: Normal range of motion. He exhibits no edema or deformity.        Right lower leg: He exhibits tenderness. He exhibits no swelling and no edema.   Neurological: He is alert and oriented to person, place, and time. No cranial nerve deficit.   Skin: Skin is warm and dry. No rash noted. He is not diaphoretic. No erythema. No pallor.   Psychiatric: He has a normal mood and affect. His behavior is normal. Thought content normal.   Nursing note and vitals reviewed.      Procedures           ED Course  ED Course as of Mar 07 0318   Sat Mar 07, 2020   0300 IMPRESSION:  No deep venous thrombus in the right lower extremity.      Signer Name: Yovany Moreno MD   Signed: 3/7/2020 2:53 AM   US Venous Doppler Lower Extremity Right (duplex) [MH]   0301 Case discussed with Dr. Lazaro    []   0310 Discussed plan of care with patient.  Advised if symptoms worsen return back to the ED.  Advised to follow-up with PCP in 1 day.    [MH]      ED Course User Index  [MH] Shea Smith PA-C                                           MDM  Number of Diagnoses or Management Options  Pain of right lower extremity: new and requires workup     Amount and/or Complexity of Data Reviewed  Tests in the radiology section of CPT®: ordered and reviewed  Discuss the patient with other providers: yes    Risk of Complications, Morbidity, and/or Mortality  Presenting problems: moderate  Diagnostic  procedures: moderate  Management options: moderate    Patient Progress  Patient progress: stable      Final diagnoses:   Pain of right lower extremity            Shea Smith PA-C  03/07/20 0320

## 2020-04-21 ENCOUNTER — HOSPITAL ENCOUNTER (EMERGENCY)
Facility: HOSPITAL | Age: 50
Discharge: HOME | End: 2020-04-21
Attending: EMERGENCY MEDICINE | Admitting: EMERGENCY MEDICINE

## 2020-04-21 VITALS
OXYGEN SATURATION: 100 % | DIASTOLIC BLOOD PRESSURE: 102 MMHG | BODY MASS INDEX: 43.95 KG/M2 | TEMPERATURE: 97.7 F | RESPIRATION RATE: 18 BRPM | HEART RATE: 98 BPM | HEIGHT: 70 IN | SYSTOLIC BLOOD PRESSURE: 146 MMHG | WEIGHT: 307 LBS

## 2020-04-21 DIAGNOSIS — J01.01 ACUTE RECURRENT MAXILLARY SINUSITIS: Primary | ICD-10-CM

## 2020-04-21 PROCEDURE — 99282 EMERGENCY DEPT VISIT SF MDM: CPT

## 2020-04-21 RX ORDER — MOMETASONE FUROATE 50 UG/1
2 SPRAY, METERED NASAL DAILY
Qty: 17 G | Refills: 0 | Status: SHIPPED | OUTPATIENT
Start: 2020-04-21

## 2020-04-21 RX ORDER — CEFDINIR 300 MG/1
300 CAPSULE ORAL 2 TIMES DAILY
Qty: 20 CAPSULE | Refills: 0 | OUTPATIENT
Start: 2020-04-21 | End: 2022-04-17

## 2020-04-21 NOTE — ED PROVIDER NOTES
Subjective   49-year-old white male presents secondary to left-sided sinus pain.  He states that he has had recurrent problems over the past few months.  He has an appointment with ear nose and throat in [place] next week.  He states that his pain is into his left upper teeth.  He states that he has had pain into his ear which feels full at times.  He has been using Flonase not resolved.  No fever.  No other complaint.  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  This is on his right side.  His pain is left-sided.          Review of Systems   Constitutional: Negative.  Negative for fever.   HENT: Negative.    Respiratory: Negative.    Cardiovascular: Negative.  Negative for chest pain.   Gastrointestinal: Negative.  Negative for abdominal pain.   Endocrine: Negative.    Genitourinary: Negative.  Negative for dysuria.   Skin: Negative.    Neurological: Negative.    Psychiatric/Behavioral: Negative.    All other systems reviewed and are negative.      Past Medical History:   Diagnosis Date   • Back pain    • Cancer (CMS/HCC)     PAROTID CANCER   • Degenerative joint disease    • Diabetes mellitus (CMS/HCC)    • DVT, lower extremity (CMS/HCC)     left 2013   • GERD (gastroesophageal reflux disease)    • Kidney stones    • Neuropathy     FEET    • Peroneal DVT (deep venous thrombosis)     in 1998 and dvt 2010   • Pulmonary embolism (CMS/HCC)    • Spinal stenosis    • Urolithiasis    • Vertigo     OCASSIONALLY       No Known Allergies    Past Surgical History:   Procedure Laterality Date   • COLONOSCOPY     • CYSTOSCOPY W/ LITHOLAPAXY / EHL     • INNER EAR SURGERY     • OTHER SURGICAL HISTORY      Mass removed from left side of face   • VASECTOMY Bilateral 3/4/2020    Procedure: VASECTOMY;  Surgeon: Angel Adkins MD;  Location: [facility];  Service: Urology;  Laterality: Bilateral;       Family History   Problem Relation Age of Onset   • Other Other          deveral siblings tatyana had early onset DVTs   • No Known Problems Mother    • No Known Problems Father        Social History     Socioeconomic History   • Marital status:      Spouse name: Not on file   • Number of children: Not on file   • Years of education: Not on file   • Highest education level: Not on file   Tobacco Use   • Smoking status: Former Smoker     Years: 20.00     Last attempt to quit:      Years since quittin.3   • Smokeless tobacco: Current User     Types: Chew   • Tobacco comment: occasionally uses chew once a year    Substance and Sexual Activity   • Alcohol use: No     Comment: beer occasionally   • Drug use: No   • Sexual activity: Defer           Objective   Physical Exam   Constitutional: He is oriented to person, place, and time. He appears well-developed and well-nourished. No distress.   HENT:   Head: Normocephalic and atraumatic.   Right Ear: External ear normal.   Left Ear: External ear normal.   Nose: Nose normal.   Left maxillary tenderness   Eyes: Pupils are equal, round, and reactive to light. Conjunctivae and EOM are normal.   Neck: Normal range of motion. Neck supple. No JVD present. No tracheal deviation present.   Cardiovascular: Normal rate, regular rhythm and normal heart sounds.   No murmur heard.  Pulmonary/Chest: Effort normal and breath sounds normal. No respiratory distress. He has no wheezes.   Abdominal: Soft. Bowel sounds are normal. There is no tenderness.   Musculoskeletal: Normal range of motion. He exhibits no edema or deformity.   Neurological: He is alert and oriented to person, place, and time. No cranial nerve deficit.   Skin: Skin is warm and dry. No rash noted. He is not diaphoretic. No erythema. No pallor.   Psychiatric: He has a normal mood and affect. His behavior is normal. Thought content normal.   Nursing note and vitals reviewed.      Procedures           ED Course                                           MDM  Number of Diagnoses or  Management Options  Acute recurrent maxillary sinusitis: new and does not require workup  Risk of Complications, Morbidity, and/or Mortality  Presenting problems: low        Final diagnoses:   Acute recurrent maxillary sinusitis            Aubrey Horn PA  04/21/20 [id number]       Aubrey Horn PA  04/29/20 7835

## 2020-04-23 ENCOUNTER — APPOINTMENT (OUTPATIENT)
Dept: CT IMAGING | Facility: HOSPITAL | Age: 50
End: 2020-04-23

## 2020-04-23 ENCOUNTER — HOSPITAL ENCOUNTER (EMERGENCY)
Facility: HOSPITAL | Age: 50
Discharge: ANOTHER HOSPITAL | End: 2020-04-23
Attending: EMERGENCY MEDICINE | Admitting: EMERGENCY MEDICINE

## 2020-04-23 VITALS
DIASTOLIC BLOOD PRESSURE: 92 MMHG | WEIGHT: 305 LBS | RESPIRATION RATE: 20 BRPM | TEMPERATURE: 98.3 F | HEIGHT: 70 IN | HEART RATE: 108 BPM | BODY MASS INDEX: 43.67 KG/M2 | SYSTOLIC BLOOD PRESSURE: 120 MMHG | OXYGEN SATURATION: 97 %

## 2020-04-23 DIAGNOSIS — R13.10 DYSPHAGIA, UNSPECIFIED TYPE: ICD-10-CM

## 2020-04-23 DIAGNOSIS — K12.2 UVULITIS: ICD-10-CM

## 2020-04-23 DIAGNOSIS — K12.2 ORAL ABSCESS: Primary | ICD-10-CM

## 2020-04-23 LAB
ALBUMIN SERPL-MCNC: 3.92 G/DL (ref 3.5–5.2)
ALBUMIN/GLOB SERPL: 1.1 G/DL
ALP SERPL-CCNC: 79 U/L (ref 39–117)
ALT SERPL W P-5'-P-CCNC: 32 U/L (ref 1–41)
ANION GAP SERPL CALCULATED.3IONS-SCNC: 12.3 MMOL/L (ref 5–15)
AST SERPL-CCNC: 18 U/L (ref 1–40)
BACTERIA UR QL AUTO: NORMAL /HPF
BASOPHILS # BLD AUTO: 0.02 10*3/MM3 (ref 0–0.2)
BASOPHILS NFR BLD AUTO: 0.2 % (ref 0–1.5)
BILIRUB SERPL-MCNC: 0.6 MG/DL (ref 0.2–1.2)
BILIRUB UR QL STRIP: NEGATIVE
BUN BLD-MCNC: 10 MG/DL (ref 6–20)
BUN/CREAT SERPL: 10.2 (ref 7–25)
CALCIUM SPEC-SCNC: 9.5 MG/DL (ref 8.6–10.5)
CHLORIDE SERPL-SCNC: 97 MMOL/L (ref 98–107)
CLARITY UR: CLEAR
CO2 SERPL-SCNC: 26.7 MMOL/L (ref 22–29)
COLOR UR: YELLOW
CREAT BLD-MCNC: 0.98 MG/DL (ref 0.76–1.27)
CRP SERPL-MCNC: 5.35 MG/DL (ref 0–0.5)
D-LACTATE SERPL-SCNC: 0.9 MMOL/L (ref 0.5–2)
DEPRECATED RDW RBC AUTO: 38.5 FL (ref 37–54)
EOSINOPHIL # BLD AUTO: 0.07 10*3/MM3 (ref 0–0.4)
EOSINOPHIL NFR BLD AUTO: 0.7 % (ref 0.3–6.2)
ERYTHROCYTE [DISTWIDTH] IN BLOOD BY AUTOMATED COUNT: 12.5 % (ref 12.3–15.4)
GFR SERPL CREATININE-BSD FRML MDRD: 82 ML/MIN/1.73
GLOBULIN UR ELPH-MCNC: 3.6 GM/DL
GLUCOSE BLD-MCNC: 143 MG/DL (ref 65–99)
GLUCOSE BLDC GLUCOMTR-MCNC: 132 MG/DL (ref 70–130)
GLUCOSE UR STRIP-MCNC: NEGATIVE MG/DL
HBA1C MFR BLD: 7.8 % (ref 4.8–5.6)
HCT VFR BLD AUTO: 52.9 % (ref 37.5–51)
HETEROPH AB SER QL LA: NEGATIVE
HGB BLD-MCNC: 17.4 G/DL (ref 13–17.7)
HGB UR QL STRIP.AUTO: NEGATIVE
HOLD SPECIMEN: NORMAL
HOLD SPECIMEN: NORMAL
HYALINE CASTS UR QL AUTO: NORMAL /LPF
IMM GRANULOCYTES # BLD AUTO: 0.02 10*3/MM3 (ref 0–0.05)
IMM GRANULOCYTES NFR BLD AUTO: 0.2 % (ref 0–0.5)
KETONES UR QL STRIP: ABNORMAL
LEUKOCYTE ESTERASE UR QL STRIP.AUTO: NEGATIVE
LYMPHOCYTES # BLD AUTO: 1.42 10*3/MM3 (ref 0.7–3.1)
LYMPHOCYTES NFR BLD AUTO: 13.8 % (ref 19.6–45.3)
MAGNESIUM SERPL-MCNC: 1.6 MG/DL (ref 1.6–2.6)
MCH RBC QN AUTO: 28 PG (ref 26.6–33)
MCHC RBC AUTO-ENTMCNC: 32.9 G/DL (ref 31.5–35.7)
MCV RBC AUTO: 85 FL (ref 79–97)
MONOCYTES # BLD AUTO: 0.8 10*3/MM3 (ref 0.1–0.9)
MONOCYTES NFR BLD AUTO: 7.8 % (ref 5–12)
NEUTROPHILS # BLD AUTO: 7.93 10*3/MM3 (ref 1.7–7)
NEUTROPHILS NFR BLD AUTO: 77.3 % (ref 42.7–76)
NITRITE UR QL STRIP: NEGATIVE
NRBC BLD AUTO-RTO: 0 /100 WBC (ref 0–0.2)
PH UR STRIP.AUTO: 7.5 [PH] (ref 5–8)
PLATELET # BLD AUTO: 191 10*3/MM3 (ref 140–450)
PMV BLD AUTO: 9 FL (ref 6–12)
POTASSIUM BLD-SCNC: 4.5 MMOL/L (ref 3.5–5.2)
PROT SERPL-MCNC: 7.5 G/DL (ref 6–8.5)
PROT UR QL STRIP: ABNORMAL
RBC # BLD AUTO: 6.22 10*6/MM3 (ref 4.14–5.8)
RBC # UR: NORMAL /HPF
REF LAB TEST METHOD: NORMAL
S PYO AG THROAT QL: NEGATIVE
SODIUM BLD-SCNC: 136 MMOL/L (ref 136–145)
SP GR UR STRIP: >1.03 (ref 1–1.03)
SQUAMOUS #/AREA URNS HPF: NORMAL /HPF
UROBILINOGEN UR QL STRIP: ABNORMAL
WBC NRBC COR # BLD: 10.26 10*3/MM3 (ref 3.4–10.8)
WBC UR QL AUTO: NORMAL /HPF
WHOLE BLOOD HOLD SPECIMEN: NORMAL
WHOLE BLOOD HOLD SPECIMEN: NORMAL

## 2020-04-23 PROCEDURE — 81001 URINALYSIS AUTO W/SCOPE: CPT | Performed by: PHYSICIAN ASSISTANT

## 2020-04-23 PROCEDURE — 25010000002 ONDANSETRON PER 1 MG: Performed by: PHYSICIAN ASSISTANT

## 2020-04-23 PROCEDURE — 70487 CT MAXILLOFACIAL W/DYE: CPT | Performed by: RADIOLOGY

## 2020-04-23 PROCEDURE — 85025 COMPLETE CBC W/AUTO DIFF WBC: CPT | Performed by: PHYSICIAN ASSISTANT

## 2020-04-23 PROCEDURE — 87081 CULTURE SCREEN ONLY: CPT | Performed by: PHYSICIAN ASSISTANT

## 2020-04-23 PROCEDURE — 0 IOVERSOL 68 % SOLUTION: Performed by: EMERGENCY MEDICINE

## 2020-04-23 PROCEDURE — 86308 HETEROPHILE ANTIBODY SCREEN: CPT | Performed by: PHYSICIAN ASSISTANT

## 2020-04-23 PROCEDURE — 83735 ASSAY OF MAGNESIUM: CPT | Performed by: PHYSICIAN ASSISTANT

## 2020-04-23 PROCEDURE — 96365 THER/PROPH/DIAG IV INF INIT: CPT

## 2020-04-23 PROCEDURE — 25010000002 HYDROMORPHONE 1 MG/ML SOLUTION: Performed by: EMERGENCY MEDICINE

## 2020-04-23 PROCEDURE — 82962 GLUCOSE BLOOD TEST: CPT

## 2020-04-23 PROCEDURE — 25010000002 DEXAMETHASONE PER 1 MG: Performed by: PHYSICIAN ASSISTANT

## 2020-04-23 PROCEDURE — 83036 HEMOGLOBIN GLYCOSYLATED A1C: CPT | Performed by: PHYSICIAN ASSISTANT

## 2020-04-23 PROCEDURE — 83605 ASSAY OF LACTIC ACID: CPT | Performed by: PHYSICIAN ASSISTANT

## 2020-04-23 PROCEDURE — 99285 EMERGENCY DEPT VISIT HI MDM: CPT

## 2020-04-23 PROCEDURE — 70487 CT MAXILLOFACIAL W/DYE: CPT

## 2020-04-23 PROCEDURE — 96361 HYDRATE IV INFUSION ADD-ON: CPT

## 2020-04-23 PROCEDURE — 70491 CT SOFT TISSUE NECK W/DYE: CPT

## 2020-04-23 PROCEDURE — 96375 TX/PRO/DX INJ NEW DRUG ADDON: CPT

## 2020-04-23 PROCEDURE — 87880 STREP A ASSAY W/OPTIC: CPT | Performed by: PHYSICIAN ASSISTANT

## 2020-04-23 PROCEDURE — 25010000002 PIPERACILLIN SOD-TAZOBACTAM PER 1 G: Performed by: PHYSICIAN ASSISTANT

## 2020-04-23 PROCEDURE — 70491 CT SOFT TISSUE NECK W/DYE: CPT | Performed by: RADIOLOGY

## 2020-04-23 PROCEDURE — 25010000002 DIPHENHYDRAMINE PER 50 MG: Performed by: PHYSICIAN ASSISTANT

## 2020-04-23 PROCEDURE — 99282 EMERGENCY DEPT VISIT SF MDM: CPT | Performed by: INTERNAL MEDICINE

## 2020-04-23 PROCEDURE — 80053 COMPREHEN METABOLIC PANEL: CPT | Performed by: PHYSICIAN ASSISTANT

## 2020-04-23 PROCEDURE — 86140 C-REACTIVE PROTEIN: CPT | Performed by: PHYSICIAN ASSISTANT

## 2020-04-23 PROCEDURE — 96376 TX/PRO/DX INJ SAME DRUG ADON: CPT

## 2020-04-23 PROCEDURE — 87040 BLOOD CULTURE FOR BACTERIA: CPT | Performed by: PHYSICIAN ASSISTANT

## 2020-04-23 RX ORDER — DEXAMETHASONE SODIUM PHOSPHATE 4 MG/ML
8 INJECTION, SOLUTION INTRA-ARTICULAR; INTRALESIONAL; INTRAMUSCULAR; INTRAVENOUS; SOFT TISSUE ONCE
Status: COMPLETED | OUTPATIENT
Start: 2020-04-23 | End: 2020-04-23

## 2020-04-23 RX ORDER — SODIUM CHLORIDE 0.9 % (FLUSH) 0.9 %
10 SYRINGE (ML) INJECTION AS NEEDED
Status: DISCONTINUED | OUTPATIENT
Start: 2020-04-23 | End: 2020-04-23 | Stop reason: HOSPADM

## 2020-04-23 RX ORDER — DIPHENHYDRAMINE HYDROCHLORIDE 50 MG/ML
25 INJECTION INTRAMUSCULAR; INTRAVENOUS ONCE
Status: COMPLETED | OUTPATIENT
Start: 2020-04-23 | End: 2020-04-23

## 2020-04-23 RX ORDER — CLONIDINE HYDROCHLORIDE 0.1 MG/1
0.2 TABLET ORAL ONCE
Status: COMPLETED | OUTPATIENT
Start: 2020-04-23 | End: 2020-04-23

## 2020-04-23 RX ORDER — SODIUM CHLORIDE 9 MG/ML
150 INJECTION, SOLUTION INTRAVENOUS CONTINUOUS
Status: DISCONTINUED | OUTPATIENT
Start: 2020-04-23 | End: 2020-04-23 | Stop reason: HOSPADM

## 2020-04-23 RX ORDER — ONDANSETRON 2 MG/ML
4 INJECTION INTRAMUSCULAR; INTRAVENOUS ONCE
Status: COMPLETED | OUTPATIENT
Start: 2020-04-23 | End: 2020-04-23

## 2020-04-23 RX ADMIN — IOVERSOL 100 ML: 678 INJECTION INTRA-ARTERIAL; INTRAVENOUS at 10:55

## 2020-04-23 RX ADMIN — DEXAMETHASONE SODIUM PHOSPHATE 8 MG: 4 INJECTION, SOLUTION INTRAMUSCULAR; INTRAVENOUS at 10:46

## 2020-04-23 RX ADMIN — CLONIDINE HYDROCHLORIDE 0.2 MG: 0.1 TABLET ORAL at 13:13

## 2020-04-23 RX ADMIN — HYDROMORPHONE HYDROCHLORIDE 1 MG: 1 INJECTION, SOLUTION INTRAMUSCULAR; INTRAVENOUS; SUBCUTANEOUS at 14:50

## 2020-04-23 RX ADMIN — SODIUM CHLORIDE 1000 ML: 9 INJECTION, SOLUTION INTRAVENOUS at 10:07

## 2020-04-23 RX ADMIN — HYDROMORPHONE HYDROCHLORIDE 1 MG: 1 INJECTION, SOLUTION INTRAMUSCULAR; INTRAVENOUS; SUBCUTANEOUS at 10:37

## 2020-04-23 RX ADMIN — PIPERACILLIN AND TAZOBACTAM 3.38 G: 3; .375 INJECTION, POWDER, FOR SOLUTION INTRAVENOUS at 10:07

## 2020-04-23 RX ADMIN — DIPHENHYDRAMINE HYDROCHLORIDE 25 MG: 50 INJECTION, SOLUTION INTRAMUSCULAR; INTRAVENOUS at 09:05

## 2020-04-23 RX ADMIN — ONDANSETRON 4 MG: 2 INJECTION INTRAMUSCULAR; INTRAVENOUS at 09:05

## 2020-04-23 RX ADMIN — SODIUM CHLORIDE 1000 ML: 9 INJECTION, SOLUTION INTRAVENOUS at 09:03

## 2020-04-23 RX ADMIN — HYDROMORPHONE HYDROCHLORIDE 1 MG: 1 INJECTION, SOLUTION INTRAMUSCULAR; INTRAVENOUS; SUBCUTANEOUS at 09:08

## 2020-04-23 RX ADMIN — SODIUM CHLORIDE 150 ML/HR: 9 INJECTION, SOLUTION INTRAVENOUS at 13:17

## 2020-04-23 NOTE — ED NOTES
Patient's significant other updated on plan of care and encouraged to call back for updates.     Naomi Senior, RN  04/23/20 7415

## 2020-04-23 NOTE — ED NOTES
Called [facility] and talked to Oscar Guallpa, he is going to send an advance truck.      Mikki Wagner  04/23/20 2074

## 2020-04-23 NOTE — ED NOTES
[facility]. EMS called back and advised they are unable to take the patient at this time.      Mikki Wagner  04/23/20 9904

## 2020-04-23 NOTE — ED NOTES
Dilaudid out of stock in [facility]. Called pharmacy to bring meds.     Naomi Senior, RN  04/23/20 8482

## 2020-04-23 NOTE — ED NOTES
Called [facility] back for an update on ambulance coming to get patient. [facility] Oscar Guallpa advised that the truck is in route.      Mikki Wagner  04/23/20 5699

## 2020-04-23 NOTE — ED NOTES
Called [place], talked to Kailee, she is going to call [facility] and call us back. Provider is made aware.      Mikki Wagner  04/23/20 1312

## 2020-04-23 NOTE — CONSULTS
[facility] HOSPITALIST CONSULT NOTE     Consults     Patient Identification:  Name:  Alek Munoz  Age:  49 y.o.  Sex:  male  :  1970  MRN:  [id number]  Visit Number:  [id number]  Room number:  102/02  Primary care provider:  Arnol Torres MD    Chief Complaint:    Chief Complaint   Patient presents with   • Oral Swelling       History of Presenting Illness:  50 yo male who presented to [facility] ED with throat pain.  He has been having issues with sinus pain and drainage for 2-3 weeks and has sought treatment at 3-4 times at 2 different EDs.  He finished a round of Omnicef and then had another antibiotic given to him as well but he does not know the name of this one.  He came in today to our ED with new onset throat pain.  He said that he was drooling when I asked him about this but then said that he meant that it is painful to swallow.  He denies chest pain, denies shortness of air, denies trouble breathing, denies nausea, denies emesis, denies diarrhea.  He does admit to a change in his voice.  He also has a tender and swollen area in the roof of his mouth.  He has left ear pain and some tenderness to palpation over the left mastoid area.    ---------------------------------------------------------------------------------------------------------------------  Review of Systems   Constitutional: Negative for chills, diaphoresis and fever.   HENT: Positive for congestion, drooling, ear pain, sore throat, trouble swallowing and voice change. Negative for ear discharge.    Eyes: Negative for discharge and redness.   Respiratory: Negative for cough and shortness of breath.    Cardiovascular: Negative for chest pain and leg swelling.   Gastrointestinal: Positive for vomiting. Negative for diarrhea and nausea.   Genitourinary: Negative for dysuria and hematuria.   Musculoskeletal: Negative for arthralgias and myalgias.   Skin: Negative for rash.   Neurological: Negative for  seizures, syncope, facial asymmetry and numbness.   Psychiatric/Behavioral: Negative for agitation and confusion.      ---------------------------------------------------------------------------------------------------------------------   Past History:  Family History   Problem Relation Age of Onset   • Other Other         deveral siblings tatyana had early onset DVTs   • No Known Problems Mother    • No Known Problems Father      Past Medical History:   Diagnosis Date   • Back pain    • Cancer (CMS/HCC)     PAROTID CANCER   • Degenerative joint disease    • Diabetes mellitus (CMS/HCC)    • DVT, lower extremity (CMS/HCC)     left    • GERD (gastroesophageal reflux disease)    • Kidney stones    • Neuropathy     FEET    • Peroneal DVT (deep venous thrombosis)     in  and dvt    • Pulmonary embolism (CMS/HCC)    • Spinal stenosis    • Urolithiasis    • Vertigo     OCASSIONALLY     Past Surgical History:   Procedure Laterality Date   • COLONOSCOPY     • CYSTOSCOPY W/ LITHOLAPAXY / EHL     • INNER EAR SURGERY     • OTHER SURGICAL HISTORY      Mass removed from left side of face   • VASECTOMY Bilateral 3/4/2020    Procedure: VASECTOMY;  Surgeon: Angel Adkins MD;  Location: [facility];  Service: Urology;  Laterality: Bilateral;     Social History     Socioeconomic History   • Marital status:    Tobacco Use   • Smoking status: Former Smoker     Years: 20.00     Last attempt to quit: 2003     Years since quittin.3   • Smokeless tobacco: Current User     Types: Chew   • Tobacco comment: occasionally uses chew once a year    Substance and Sexual Activity   • Alcohol use: No     Comment: beer occasionally   • Drug use: No   • Sexual activity: Defer     ---------------------------------------------------------------------------------------------------------------------   Allergies:  Patient has no known  "allergies.  ---------------------------------------------------------------------------------------------------------------------  Medications below are reported home medications pulling from within the system; at this time, these medications have not been reconciled unless otherwise specified and are in the verification process for further verifcation as current home medications.\\    Prior to Admission Medications     Prescriptions Last Dose Informant Patient Reported? Taking?    butalbital-acetaminophen-caffeine (FIORICET, ESGIC) -40 MG per tablet   Yes No    cefdinir (OMNICEF) 300 MG capsule   No No    Take 1 capsule by mouth 2 (Two) Times a Day.    cyclobenzaprine (FLEXERIL) 10 MG tablet   Yes No    Take 10 mg by mouth 3 (Three) Times a Day As Needed for Muscle Spasms.    gabapentin (NEURONTIN) 800 MG tablet   Yes No    4 (Four) Times a Day.    Insulin Glargine (BASAGLAR KWIKPEN SC)  Self Yes No    Inject 55 Units under the skin into the appropriate area as directed Every Evening.    meclizine (ANTIVERT) 25 MG tablet   No No    Take 1 tablet by mouth 3 (Three) Times a Day As Needed for Dizziness.    mometasone (Nasonex) 50 MCG/ACT nasal spray   No No    2 sprays into the nostril(s) as directed by provider Daily.    omeprazole (PriLOSEC) 20 MG capsule   Yes No    oxyCODONE-acetaminophen (PERCOCET)  MG per tablet   Yes No    Take 1 tablet by mouth Every 6 (Six) Hours As Needed.    oxyCODONE-acetaminophen (PERCOCET)  MG per tablet   No No    Take 1 tablet by mouth Every 4 (Four) Hours As Needed for Moderate Pain  (Pain).    phentermine (ADIPEX-P) 37.5 MG capsule   Yes No    Take 37.5 mg by mouth Every Morning.    Syringe 25G X 5/8\" 3 ML misc   No No    Use as directed 2 x weekly    Testosterone Cypionate (DEPO-TESTOSTERONE) 200 MG/ML injection   No No    Inject 1/2 cc subcutaneously every Monday and Thursday    tiZANidine (ZANAFLEX) 4 MG tablet  Self Yes No    Take 4 mg by mouth At Night As " Needed for Muscle Spasms.        Current listed hospital scheduled medications may not yet reflect those currently placed in orders that are signed and held awaiting patient's arrival to floor.   ---------------------------------------------------------------------------------------------------------------------   Vital Signs:  Temp:  [98.2 °F (36.8 °C)] 98.2 °F (36.8 °C)  Heart Rate:  [112-128] 123  Resp:  [18] 18  BP: (151-217)/() 217/115    Mean Arterial Pressure (Non-Invasive) for the past 24 hrs (Last 3 readings):   Noninvasive MAP (mmHg)   04/23/20 1303 142   04/23/20 1247 100   04/23/20 1233 113     SpO2:  [93 %-100 %] 93 %  on  Flow (L/min):  [2] 2;   Device (Oxygen Therapy): nasal cannula  Body mass index is 43.76 kg/m².    Wt Readings from Last 1 Encounters:   04/23/20 0829 (!) 138 kg (305 lb)     Wt Readings from Last 3 Encounters:   04/23/20 (!) 138 kg (305 lb)   04/21/20 (!) 139 kg (307 lb)   03/07/20 (!) 142 kg (314 lb)           ---------------------------------------------------------------------------------------------------------------------   Physical Exam   Constitutional: He is oriented to person, place, and time. He appears well-developed. He appears distressed.   HENT:   Head: Normocephalic and atraumatic. Head is without raccoon's eyes.   Nose: Nose normal.   Mouth/Throat: Mucous membranes are not pale, not dry and not cyanotic. He does not have dentures. No trismus in the jaw. Uvula swelling and dental caries present. Posterior oropharyngeal erythema and tonsillar abscesses present. Tonsils are 4+ on the right. Tonsils are 4+ on the left. No tonsillar exudate.   Palpable parotid mass that is known to the patient.   Eyes: Pupils are equal, round, and reactive to light. Conjunctivae and EOM are normal. Right eye exhibits no discharge. Left eye exhibits no discharge. No scleral icterus.   Neck: Neck supple. No tracheal deviation present.   Cardiovascular: Normal rate, regular rhythm and  intact distal pulses.   No murmur heard.  Pulmonary/Chest: Effort normal and breath sounds normal. No respiratory distress. He has no wheezes. He has no rales.   Abdominal: Soft. Bowel sounds are normal. He exhibits no distension.   Musculoskeletal: He exhibits no edema or deformity.   Neurological: He is alert and oriented to person, place, and time. No cranial nerve deficit.   Skin: Capillary refill takes less than 2 seconds. No rash noted. He is not diaphoretic. No erythema.   Psychiatric: He has a normal mood and affect. His behavior is normal. Judgment and thought content normal.     ---------------------------------------------------------------------------------------------------------------------   EKG:  None    Telemetry:  Sinus tachycardia in the 120-130's.    I have personally looked at both the EKG and the telemetry strips.  ---------------------------------------------------------------------------------------------------------------------   Results from last 7 days   Lab Units 04/23/20  0903   CRP mg/dL 5.35*   LACTATE mmol/L 0.9   WBC 10*3/mm3 10.26   HEMOGLOBIN g/dL 17.4   HEMATOCRIT % 52.9*   MCV fL 85.0   MCHC g/dL 32.9   PLATELETS 10*3/mm3 191         Results from last 7 days   Lab Units 04/23/20  0903   SODIUM mmol/L 136   POTASSIUM mmol/L 4.5   MAGNESIUM mg/dL 1.6   CHLORIDE mmol/L 97*   CO2 mmol/L 26.7   BUN mg/dL 10   CREATININE mg/dL 0.98   EGFR IF NONAFRICN AM mL/min/1.73 82   CALCIUM mg/dL 9.5   GLUCOSE mg/dL 143*   ALBUMIN g/dL 3.92   BILIRUBIN mg/dL 0.6   ALK PHOS U/L 79   AST (SGOT) U/L 18   ALT (SGPT) U/L 32   Estimated Creatinine Clearance: 130.5 mL/min (by C-G formula based on SCr of 0.98 mg/dL).      Hemoglobin A1C   Date/Time Value Ref Range Status   04/23/2020 0903 7.80 (H) 4.80 - 5.60 % Final     Glucose   Date/Time Value Ref Range Status   04/23/2020 0921 132 (H) 70 - 130 mg/dL Final     Brief Urine Lab Results  (Last result in the past 365 days)      Color   Clarity   Blood    Leuk Est   Nitrite   Protein   CREAT   Urine HCG        04/23/20 1103 Yellow Clear Negative Negative Negative 30 mg/dL (1+)             Results from last 7 days   Lab Units 04/23/20  1103   NITRITE UA  Negative   WBC UA /HPF 0-2   BACTERIA UA /HPF None Seen   SQUAM EPITHEL UA /HPF None Seen     Results from last 7 days   Lab Units 04/23/20  0903   LACTATE mmol/L 0.9   CRP mg/dL 5.35*       I have personally looked at the labs and they are summarized above.  ---------------------------------------------------------------------------------------------------------------------   Detailed radiology reports for the last 24 hours:    Imaging Results (Last 24 Hours)     Procedure Component Value Units Date/Time    CT Soft Tissue Neck With Contrast [[id number]] Collected:  04/23/20 1034     Updated:  04/23/20 1054    Narrative:       EXAMINATION: CT SOFT TISSUE NECK W CONTRAST-      CLINICAL INDICATION: left side neck swelling, pain with swallowing,  swelling to throat        COMPARISON: PET scan 12/14/2018      DOSE: 1291.16 mGy.cm     Radiation dose reduction techniques were utilized per ALARA protocol.  Automated exposure control was initiated through either or [employer] or  DoseRight software packages by  protocol.              TECHNIQUE: Axial images were acquired through theneck duringIV contrast.  Reformatted images were created.     FINDINGS:  Right parotid mass measuring 3.75 cm and on the PET scan was 3.4 cm.     Scattered lymph nodes are seen bilaterally. At least one that is  slightly enlarged just deep to the right sternocleidomastoid muscle.     Opacification of the left maxillary sinus     No extrinsic deviation of the airway     No drainable fluid collections.       Impression:       1. Small lymph nodes bilaterally with one that appears to be borderline  in regards to size in the right neck.  2. Large right parotid mass  3. Other findings as above     This report was finalized on 4/23/2020 10:35 AM  by Dr. Rush Amin MD.       CT Facial Bones With Contrast [[id number]] Collected:  04/23/20 1031     Updated:  04/23/20 1053    Narrative:       EXAMINATION: CT FACIAL BONES W CONTRAST-      CLINICAL INDICATION: left side facial swelling and pain        COMPARISON: PET scan dated 12/14/2018      DOSE:     Radiation dose reduction techniques were utilized per ALARA protocol.  Automated exposure control was initiated through either or [employer] or  DoseRight software packages by  protocol.              TECHNIQUE: Axial images were acquired through thefacial bones duringIV  contrast. Reformatted images were created.     FINDINGS:  3.75 cm right parotid mass is present. Previously it measured 3.4 cm.  There is a single enlarged lymph node just deep to the right  sternocleidomastoid muscle. Other smaller lymph nodes are present  bilaterally but none above threshold.     No extrinsic deviation of the airway     Diffuse opacification of the left maxillary sinus. No erosion of bone       Impression:       1. Diffuse opacification of the left maxillary sinus  2. Right parotid mass slightly larger than on the previous exam  3. 2 enlarged lymph nodes in the right neck but none on the left.     This report was finalized on 4/23/2020 10:34 AM by Dr. Rush Amin MD.           I have personally looked at the radiology images and read the final radiology report.  ----------------------------------------------------------------------------------------------------------------------  Assessment & Plan      -Uvular edema with severe narrowing of the airway  -Edema of the hard and soft palate, concerning for an abscessed tooth as the cause  -Acute hypomagnesemia  -Acute meft maxillary and ethmoid sinusitis  -Benign right parotid mass that is non cancerous  -Morbid obesity, BMI 43.76 kg/m2  -Type 2 diabetes mellitus  -Essential hypertension  -Former tobacco smoking addiction for 20 years    I was consulted for possible  admission to the hospitalist service.  However, after evaluation of the patient and listening to his voice (I had this patient in the past in my primary care clinic) that he is at risk of losing his airway.  We do not have ENT here; I asked the ED PA to call ENT at [facility], which she did and after reevaluation of the patient and the doubling in size of his palate during his stay in our ED, she has decided to send the patient to a facility like the [facility] for an opinion from ENT and OMFS.      Thank you for the consult.      Kimberly Tamayo MD  [facility] Hospitalist  04/23/20  13:12

## 2020-04-23 NOTE — ED NOTES
Called [facility] for transfer. I was advised that they only have one paramedic for the county at this time and will have another one after 7. Provider is made aware.        Mikki Wagner  04/23/20 [id number]

## 2020-04-23 NOTE — ED NOTES
Bedside report given to [facility] ALS crew. NADN at time of transport. VSS. Oxygenation maintained on 2L NC and pt tolerating well. Respirations even, unlabored. Skin PWD. No needs or concerns voiced. Patient reports pain is improving since pain medication. IV in place and infusing without difficulty. Patient belongings with patient at time of transfer.      Naomi Senior, RN  04/23/20 [id number]

## 2020-04-23 NOTE — ED NOTES
Alexys Coronado, is made aware of transfer and Aliya Beck Coming to get the patient.      Mikki Wagner  04/23/20 7206

## 2020-04-23 NOTE — ED NOTES
Pao Huynh called [facility] for possible transfer. She is on the phone at this time.      Mikki Wagner  04/23/20 1257

## 2020-04-23 NOTE — ED PROVIDER NOTES
Subjective   49-year-old male who presents to the ED today for sore throat, difficulty swallowing and swelling to the roof of his mouth.  He states the symptoms have gotten progressively worse over the last 3 days.  He reports that he was seen here in the ED 2 days ago because he was having left-sided facial pain and nasal congestion.  He states the pain was in his left upper teeth and radiated up into his face.  He was diagnosed with a sinus infection and started on Omnicef.  He states he has been taking it but his symptoms have progressively gotten worse.  He states yesterday he developed a severe sore throat and felt like his throat was swollen.  He states it was difficult to swallow his own saliva due to the pain.  He states he has not been drooling.  He states he also developed swelling to the left hard palate.  He states the area is tender and painful.  He states he went to [facility] ER last night because his symptoms seem to be getting worse.  He states he had a CT of his face and they told him he had a sinus infection and mastoiditis.  He states their plan was to change his antibiotic.  He states he has not been able to take it yet and his symptoms continue to get worse.  He states today he has runny nose and is having a left-sided headache.  He states he has noticed some left-sided facial swelling today.  He states his sore throat has gotten worse.  He denies any cough or fever.  He denies any chills or sweats.  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  That has been there since approximately November 2014.  He had a biopsy of the area by Dr. Bonilla about 2 or 3 years ago which showed it to be benign.  At that time he was declining surgery to have it removed due to the risk factors.  He states he actually had agreed to have surgery to have it removed and was scheduled to have surgery last month however due to the restrictions from COVID-19 the surgery was canceled.  He states he has  not had any changes to the mass and it is not causing him any difficulty at this time.      History provided by:  Patient  Sore Throat   Location:  Generalized  Quality:  Sore  Severity:  Severe  Onset quality:  Gradual  Duration:  3 days  Timing:  Constant  Progression:  Worsening  Chronicity:  New  Relieved by:  Nothing  Worsened by:  Swallowing  Associated symptoms: adenopathy, ear pain, headaches, plugged ear sensation, postnasal drip, rhinorrhea, sinus congestion, trouble swallowing and voice change    Associated symptoms: no abdominal pain, no chest pain, no chills, no cough, no drooling, no ear discharge, no epistaxis, no eye discharge, no fever, no neck stiffness, no night sweats, no rash, no shortness of breath and no stridor    Risk factors: no exposure to strep, no exposure to mono, no sick contacts and no recent dental procedure        Review of Systems   Constitutional: Negative for chills, diaphoresis, fever and night sweats.   HENT: Positive for congestion, ear pain, facial swelling, mouth sores, postnasal drip, rhinorrhea, sinus pressure, sinus pain, sore throat, trouble swallowing and voice change. Negative for drooling, ear discharge and nosebleeds.    Eyes: Negative for discharge.   Respiratory: Negative for cough, shortness of breath and stridor.    Cardiovascular: Negative for chest pain.   Gastrointestinal: Positive for nausea. Negative for abdominal pain, diarrhea and vomiting.   Genitourinary: Negative.    Musculoskeletal: Negative.  Negative for neck stiffness.   Skin: Negative for rash.   Neurological: Positive for headaches.   Hematological: Positive for adenopathy.   All other systems reviewed and are negative.      Past Medical History:   Diagnosis Date   • Back pain    • Cancer (CMS/HCC)     PAROTID CANCER   • Degenerative joint disease    • Diabetes mellitus (CMS/HCC)    • DVT, lower extremity (CMS/HCC)     left 2013   • GERD (gastroesophageal reflux disease)    • Kidney stones    •  Neuropathy     FEET    • Peroneal DVT (deep venous thrombosis)     in  and dvt    • Pulmonary embolism (CMS/HCC)    • Spinal stenosis    • Urolithiasis    • Vertigo     OCASSIONALLY       No Known Allergies    Past Surgical History:   Procedure Laterality Date   • COLONOSCOPY     • CYSTOSCOPY W/ LITHOLAPAXY / EHL     • INNER EAR SURGERY     • OTHER SURGICAL HISTORY      Mass removed from left side of face   • VASECTOMY Bilateral 3/4/2020    Procedure: VASECTOMY;  Surgeon: Angel Adkins MD;  Location: [facility];  Service: Urology;  Laterality: Bilateral;       Family History   Problem Relation Age of Onset   • Other Other         deveral siblings havd had early onset DVTs   • No Known Problems Mother    • No Known Problems Father        Social History     Socioeconomic History   • Marital status:      Spouse name: Not on file   • Number of children: Not on file   • Years of education: Not on file   • Highest education level: Not on file   Tobacco Use   • Smoking status: Former Smoker     Years: 20.00     Last attempt to quit:      Years since quittin.3   • Smokeless tobacco: Current User     Types: Chew   • Tobacco comment: occasionally uses chew once a year    Substance and Sexual Activity   • Alcohol use: No     Comment: beer occasionally   • Drug use: No   • Sexual activity: Defer           Objective   Physical Exam   Constitutional: He is oriented to person, place, and time. He appears well-developed and well-nourished. He appears ill.   HENT:   Head: Normocephalic and atraumatic.   Right Ear: External ear normal. No mastoid tenderness.   Left Ear: There is swelling (behind the left ear). No mastoid tenderness. Tympanic membrane is retracted. Tympanic membrane is not perforated and not erythematous.   Nose: Nose normal.   Mouth/Throat: Mucous membranes are normal. Oral lesions (area of swelling to the left side of the hard palpate with a yellow head noted, no active drainage)  present. No trismus in the jaw. Uvula swelling present. Posterior oropharyngeal edema and posterior oropharyngeal erythema present. No oropharyngeal exudate.   Right parotid gland mass noted   Eyes: Pupils are equal, round, and reactive to light. Conjunctivae and EOM are normal. Right eye exhibits no discharge. Left eye exhibits no discharge.   Neck: Normal range of motion.   Cardiovascular: Regular rhythm, normal heart sounds and intact distal pulses. Tachycardia present.   Pulmonary/Chest: Effort normal and breath sounds normal. No respiratory distress. He has no wheezes. He exhibits no tenderness.   Abdominal: Soft. Bowel sounds are normal. There is no tenderness. There is no rebound and no guarding.   Musculoskeletal: Normal range of motion.   Neurological: He is alert and oriented to person, place, and time.   Skin: Skin is warm and dry. Capillary refill takes less than 2 seconds. No rash noted.   Psychiatric: He has a normal mood and affect. His behavior is normal. Judgment and thought content normal.   Nursing note and vitals reviewed.      Procedures           ED Course  ED Course as of Apr 23 1340   Thu Apr 23, 2020   0855 I saw this patient personally.  I agree with Pao's plan of care.    [CM]   1058 I have discussed with Dr. Tamayo.  She is going to come to the ED to see the patient.    [AH]   1102 FINDINGS:  Right parotid mass measuring 3.75 cm and on the PET scan was 3.4 cm.     Scattered lymph nodes are seen bilaterally. At least one that is  slightly enlarged just deep to the right sternocleidomastoid muscle.     Opacification of the left maxillary sinus     No extrinsic deviation of the airway     No drainable fluid collections.     IMPRESSION:  1. Small lymph nodes bilaterally with one that appears to be borderline  in regards to size in the right neck.  2. Large right parotid mass  3. Other findings as above      CT Soft Tissue Neck With Contrast [AH]   1102 FINDINGS:  3.75 cm right parotid mass  is present. Previously it measured 3.4 cm.  There is a single enlarged lymph node just deep to the right  sternocleidomastoid muscle. Other smaller lymph nodes are present  bilaterally but none above threshold.     No extrinsic deviation of the airway     Diffuse opacification of the left maxillary sinus. No erosion of bone     IMPRESSION:  1. Diffuse opacification of the left maxillary sinus  2. Right parotid mass slightly larger than on the previous exam  3. 2 enlarged lymph nodes in the right neck but none on the left.   CT Facial Bones With Contrast [AH]   1143 Dr. Tamayo has evaluated the patient in the ED.  She requests that I discuss the case with ENT at [facility] and have them review the CT scans due to the concern for airway swelling.    [AH]   1242 I have discussed with Dr. Hiral Garza, ENT.  She will evaluate the CT scans and call back.  She advised based on verbal report of history and physical that most likely the patient will be able to stay here and would benefit from IV abx and steroids.  She advised the likelihood of airway compromise for this patient is very low.      [AH]   1316 Dr. Garza called back and advised she is more concerned that the patient has a large dental abscess to the left upper molar.  She feels as if this may be the source of all of the problem.  I re-evaluated the patient and the area of swelling to his hard palate has doubled in size and he reports it is becoming more painful.  The patient continues to be tachycardic and hypertensive.  He is also now diaphoretic however he is not running a fever.  I spoke with Dr. Jarod Romero, oral surgery at .  He accepts the patient in transfer to the ED.  He advised this could be something that can be drained in the ED and he could possibly be discharged home later tonight.  He advised to make sure the patient had transportation home.  This was information was passed on to the patient by me and the patient's spouse by nursing  staff.    [AH]      ED Course User Index  [AH] Pao Huynh PA  [] Stephen Camacho MD                                           MDM  Number of Diagnoses or Management Options     Amount and/or Complexity of Data Reviewed  Clinical lab tests: reviewed  Tests in the radiology section of CPT®: reviewed  Discuss the patient with other providers: yes    Patient Progress  Patient progress: stable      Final diagnoses:   Oral abscess   Uvulitis   Dysphagia, unspecified type            Pao Hunyh PA  04/23/20 3859

## 2020-04-25 LAB — BACTERIA SPEC AEROBE CULT: NORMAL

## 2020-04-27 ENCOUNTER — TRANSCRIBE ORDERS (OUTPATIENT)
Dept: ADMINISTRATIVE | Facility: HOSPITAL | Age: 50
End: 2020-04-27

## 2020-04-27 DIAGNOSIS — R22.0 FACIAL SWELLING: Primary | ICD-10-CM

## 2020-04-28 LAB
BACTERIA SPEC AEROBE CULT: NORMAL
BACTERIA SPEC AEROBE CULT: NORMAL

## 2020-07-28 ENCOUNTER — APPOINTMENT (OUTPATIENT)
Dept: CT IMAGING | Facility: HOSPITAL | Age: 50
End: 2020-07-28

## 2020-07-28 ENCOUNTER — HOSPITAL ENCOUNTER (EMERGENCY)
Facility: HOSPITAL | Age: 50
Discharge: HOME | End: 2020-07-28
Attending: FAMILY MEDICINE | Admitting: FAMILY MEDICINE

## 2020-07-28 VITALS
DIASTOLIC BLOOD PRESSURE: 93 MMHG | WEIGHT: 315 LBS | SYSTOLIC BLOOD PRESSURE: 164 MMHG | BODY MASS INDEX: 45.1 KG/M2 | OXYGEN SATURATION: 96 % | TEMPERATURE: 98 F | HEIGHT: 70 IN | RESPIRATION RATE: 18 BRPM | HEART RATE: 80 BPM

## 2020-07-28 DIAGNOSIS — M54.41 CHRONIC BILATERAL LOW BACK PAIN WITH BILATERAL SCIATICA: Primary | ICD-10-CM

## 2020-07-28 DIAGNOSIS — H00.014 HORDEOLUM EXTERNUM OF LEFT UPPER EYELID: ICD-10-CM

## 2020-07-28 DIAGNOSIS — L03.213 PRESEPTAL CELLULITIS OF LEFT EYE: ICD-10-CM

## 2020-07-28 DIAGNOSIS — G89.29 CHRONIC BILATERAL LOW BACK PAIN WITH BILATERAL SCIATICA: Primary | ICD-10-CM

## 2020-07-28 DIAGNOSIS — M54.42 CHRONIC BILATERAL LOW BACK PAIN WITH BILATERAL SCIATICA: Primary | ICD-10-CM

## 2020-07-28 LAB
ALBUMIN SERPL-MCNC: 3.99 G/DL (ref 3.5–5.2)
ALBUMIN/GLOB SERPL: 1.2 G/DL
ALP SERPL-CCNC: 72 U/L (ref 39–117)
ALT SERPL W P-5'-P-CCNC: 47 U/L (ref 1–41)
ANION GAP SERPL CALCULATED.3IONS-SCNC: 11 MMOL/L (ref 5–15)
AST SERPL-CCNC: 31 U/L (ref 1–40)
BACTERIA UR QL AUTO: NORMAL /HPF
BASOPHILS # BLD AUTO: 0.03 10*3/MM3 (ref 0–0.2)
BASOPHILS NFR BLD AUTO: 0.3 % (ref 0–1.5)
BILIRUB SERPL-MCNC: 0.3 MG/DL (ref 0–1.2)
BILIRUB UR QL STRIP: NEGATIVE
BUN SERPL-MCNC: 8 MG/DL (ref 6–20)
BUN/CREAT SERPL: 9.1 (ref 7–25)
CALCIUM SPEC-SCNC: 9.4 MG/DL (ref 8.6–10.5)
CHLORIDE SERPL-SCNC: 101 MMOL/L (ref 98–107)
CLARITY UR: CLEAR
CO2 SERPL-SCNC: 25 MMOL/L (ref 22–29)
COLOR UR: YELLOW
CREAT SERPL-MCNC: 0.88 MG/DL (ref 0.76–1.27)
DEPRECATED RDW RBC AUTO: 39.8 FL (ref 37–54)
EOSINOPHIL # BLD AUTO: 0.12 10*3/MM3 (ref 0–0.4)
EOSINOPHIL NFR BLD AUTO: 1.3 % (ref 0.3–6.2)
ERYTHROCYTE [DISTWIDTH] IN BLOOD BY AUTOMATED COUNT: 12.9 % (ref 12.3–15.4)
GFR SERPL CREATININE-BSD FRML MDRD: 92 ML/MIN/1.73
GLOBULIN UR ELPH-MCNC: 3.4 GM/DL
GLUCOSE SERPL-MCNC: 153 MG/DL (ref 65–99)
GLUCOSE UR STRIP-MCNC: ABNORMAL MG/DL
HCT VFR BLD AUTO: 53 % (ref 37.5–51)
HGB BLD-MCNC: 17.7 G/DL (ref 13–17.7)
HGB UR QL STRIP.AUTO: NEGATIVE
HYALINE CASTS UR QL AUTO: NORMAL /LPF
IMM GRANULOCYTES # BLD AUTO: 0.04 10*3/MM3 (ref 0–0.05)
IMM GRANULOCYTES NFR BLD AUTO: 0.4 % (ref 0–0.5)
KETONES UR QL STRIP: ABNORMAL
LEUKOCYTE ESTERASE UR QL STRIP.AUTO: NEGATIVE
LYMPHOCYTES # BLD AUTO: 2.23 10*3/MM3 (ref 0.7–3.1)
LYMPHOCYTES NFR BLD AUTO: 24.2 % (ref 19.6–45.3)
MCH RBC QN AUTO: 28.7 PG (ref 26.6–33)
MCHC RBC AUTO-ENTMCNC: 33.4 G/DL (ref 31.5–35.7)
MCV RBC AUTO: 86 FL (ref 79–97)
MONOCYTES # BLD AUTO: 0.66 10*3/MM3 (ref 0.1–0.9)
MONOCYTES NFR BLD AUTO: 7.2 % (ref 5–12)
NEUTROPHILS NFR BLD AUTO: 6.14 10*3/MM3 (ref 1.7–7)
NEUTROPHILS NFR BLD AUTO: 66.6 % (ref 42.7–76)
NITRITE UR QL STRIP: NEGATIVE
NRBC BLD AUTO-RTO: 0 /100 WBC (ref 0–0.2)
PH UR STRIP.AUTO: 5.5 [PH] (ref 5–8)
PLATELET # BLD AUTO: 195 10*3/MM3 (ref 140–450)
PMV BLD AUTO: 9.5 FL (ref 6–12)
POTASSIUM SERPL-SCNC: 4.8 MMOL/L (ref 3.5–5.2)
PROT SERPL-MCNC: 7.4 G/DL (ref 6–8.5)
PROT UR QL STRIP: ABNORMAL
RBC # BLD AUTO: 6.16 10*6/MM3 (ref 4.14–5.8)
RBC # UR: NORMAL /HPF
REF LAB TEST METHOD: NORMAL
SODIUM SERPL-SCNC: 137 MMOL/L (ref 136–145)
SP GR UR STRIP: 1.02 (ref 1–1.03)
SQUAMOUS #/AREA URNS HPF: NORMAL /HPF
UROBILINOGEN UR QL STRIP: ABNORMAL
WBC # BLD AUTO: 9.22 10*3/MM3 (ref 3.4–10.8)
WBC UR QL AUTO: NORMAL /HPF

## 2020-07-28 PROCEDURE — 80053 COMPREHEN METABOLIC PANEL: CPT | Performed by: FAMILY MEDICINE

## 2020-07-28 PROCEDURE — 72128 CT CHEST SPINE W/O DYE: CPT

## 2020-07-28 PROCEDURE — 99283 EMERGENCY DEPT VISIT LOW MDM: CPT

## 2020-07-28 PROCEDURE — 72128 CT CHEST SPINE W/O DYE: CPT | Performed by: RADIOLOGY

## 2020-07-28 PROCEDURE — 96372 THER/PROPH/DIAG INJ SC/IM: CPT

## 2020-07-28 PROCEDURE — 85025 COMPLETE CBC W/AUTO DIFF WBC: CPT | Performed by: FAMILY MEDICINE

## 2020-07-28 PROCEDURE — 72131 CT LUMBAR SPINE W/O DYE: CPT | Performed by: RADIOLOGY

## 2020-07-28 PROCEDURE — 81001 URINALYSIS AUTO W/SCOPE: CPT | Performed by: FAMILY MEDICINE

## 2020-07-28 PROCEDURE — 25010000002 DEXAMETHASONE PER 1 MG: Performed by: FAMILY MEDICINE

## 2020-07-28 PROCEDURE — 72131 CT LUMBAR SPINE W/O DYE: CPT

## 2020-07-28 RX ORDER — DEXAMETHASONE SODIUM PHOSPHATE 4 MG/ML
4 INJECTION, SOLUTION INTRA-ARTICULAR; INTRALESIONAL; INTRAMUSCULAR; INTRAVENOUS; SOFT TISSUE ONCE
Status: COMPLETED | OUTPATIENT
Start: 2020-07-28 | End: 2020-07-28

## 2020-07-28 RX ORDER — CLINDAMYCIN HYDROCHLORIDE 300 MG/1
300 CAPSULE ORAL 4 TIMES DAILY
Qty: 40 CAPSULE | Refills: 0 | OUTPATIENT
Start: 2020-07-28 | End: 2022-04-17

## 2020-07-28 RX ORDER — TIZANIDINE 4 MG/1
4 TABLET ORAL NIGHTLY PRN
Qty: 30 TABLET | Refills: 0 | Status: SHIPPED | OUTPATIENT
Start: 2020-07-28

## 2020-07-28 RX ORDER — ERYTHROMYCIN 5 MG/G
OINTMENT OPHTHALMIC
Qty: 1 EACH | Refills: 0 | OUTPATIENT
Start: 2020-07-28 | End: 2022-04-17

## 2020-07-28 RX ORDER — CLINDAMYCIN PHOSPHATE 150 MG/ML
600 INJECTION, SOLUTION INTRAVENOUS ONCE
Status: COMPLETED | OUTPATIENT
Start: 2020-07-28 | End: 2020-07-28

## 2020-07-28 RX ADMIN — CLINDAMYCIN PHOSPHATE 600 MG: 150 INJECTION, SOLUTION INTRAMUSCULAR; INTRAVENOUS at 12:04

## 2020-07-28 RX ADMIN — DEXAMETHASONE SODIUM PHOSPHATE 4 MG: 4 INJECTION, SOLUTION INTRAMUSCULAR; INTRAVENOUS at 13:17

## 2020-07-28 NOTE — ED PROVIDER NOTES
Subjective   Patient is a 50-year-old male who presents the emergency department complaining of left eye swelling that started this morning.  The patient states that he has had a stye on his left eye the past couple days and when he woke up this morning his eyelid was more swollen.  He also notes some swelling around the perimeter of his eye as well.  He has no visual changes, and is able to move his eye from left to right and up and down without any pain.  He denies any fever, chills, nausea, vomiting or headache.  The patient states that this is never happened to him before.  He states that he has not had any treatment for his stye or these issues as of yet.  He also mentions that he has had some low back pain.  He has a history of back problems, and states that he has had no recent injury but his back pain is been much worse over the past few days.  He has associated sciatica on both legs with this back pain.  He denies any difficulty urinating, difficulty having bowel movement or any additional symptoms at this time.          Review of Systems   Constitutional: Negative for activity change, appetite change, chills, diaphoresis, fatigue and fever.   HENT: Positive for facial swelling. Negative for congestion, postnasal drip, rhinorrhea, sinus pressure, sinus pain, sneezing and sore throat.    Eyes: Positive for itching. Negative for pain and discharge.   Respiratory: Negative for apnea, cough, shortness of breath and stridor.    Cardiovascular: Negative for chest pain, palpitations and leg swelling.   Gastrointestinal: Negative for abdominal distention, abdominal pain, diarrhea, nausea and vomiting.   Genitourinary: Negative for difficulty urinating and flank pain.   Musculoskeletal: Negative for arthralgias and back pain.   Neurological: Negative for dizziness and light-headedness.   Psychiatric/Behavioral: Negative for agitation and decreased concentration.       Past Medical History:   Diagnosis Date   • Back  pain    • Cancer (CMS/HCC)     PAROTID CANCER   • Degenerative joint disease    • Diabetes mellitus (CMS/HCC)    • DVT, lower extremity (CMS/HCC)     left    • GERD (gastroesophageal reflux disease)    • Kidney stones    • Neuropathy     FEET    • Peroneal DVT (deep venous thrombosis)     in  and dvt    • Pulmonary embolism (CMS/HCC)    • Spinal stenosis    • Urolithiasis    • Vertigo     OCASSIONALLY       No Known Allergies    Past Surgical History:   Procedure Laterality Date   • COLONOSCOPY     • CYSTOSCOPY W/ LITHOLAPAXY / EHL     • INNER EAR SURGERY     • OTHER SURGICAL HISTORY      Mass removed from left side of face   • VASECTOMY Bilateral 3/4/2020    Procedure: VASECTOMY;  Surgeon: Angel Adkins MD;  Location: [facility];  Service: Urology;  Laterality: Bilateral;       Family History   Problem Relation Age of Onset   • Other Other         deveral siblings havd had early onset DVTs   • No Known Problems Mother    • No Known Problems Father        Social History     Socioeconomic History   • Marital status:      Spouse name: Not on file   • Number of children: Not on file   • Years of education: Not on file   • Highest education level: Not on file   Tobacco Use   • Smoking status: Former Smoker     Years: 20.00     Last attempt to quit:      Years since quittin.5   • Smokeless tobacco: Current User     Types: Chew   • Tobacco comment: occasionally uses chew once a year    Substance and Sexual Activity   • Alcohol use: No     Comment: beer occasionally   • Drug use: No   • Sexual activity: Defer           Objective   Physical Exam   Constitutional: He appears well-developed and well-nourished. No distress.   HENT:   Head: Normocephalic and atraumatic.   Right Ear: External ear normal.   Left Ear: External ear normal.   Nose: Nose normal.   Mouth/Throat: Oropharynx is clear and moist. No oropharyngeal exudate.   Mild preseptal cellulitis on examination with a small stye on  the left upper eyelid   Eyes: Pupils are equal, round, and reactive to light. Conjunctivae and EOM are normal. Right eye exhibits no discharge. Left eye exhibits no discharge. No scleral icterus.   Neck: Normal range of motion. Neck supple. No JVD present. No tracheal deviation present. No thyromegaly present.   Cardiovascular: Normal rate, regular rhythm and normal heart sounds. Exam reveals no gallop and no friction rub.   No murmur heard.  Pulmonary/Chest: Effort normal and breath sounds normal. No stridor. No respiratory distress.   Abdominal: Soft. Bowel sounds are normal. He exhibits no distension and no mass. There is no tenderness. There is no guarding.   Skin: Skin is warm and dry. Capillary refill takes less than 2 seconds. No rash noted. He is not diaphoretic. No erythema. No pallor.   Psychiatric: He has a normal mood and affect. His behavior is normal.   Nursing note and vitals reviewed.      Procedures           ED Course                                           MDM  Number of Diagnoses or Management Options  Chronic bilateral low back pain with bilateral sciatica: new and requires workup  Hordeolum externum of left upper eyelid: new and requires workup  Preseptal cellulitis of left eye: new and requires workup     Amount and/or Complexity of Data Reviewed  Clinical lab tests: reviewed and ordered  Tests in the radiology section of CPT®: ordered and reviewed  Tests in the medicine section of CPT®: ordered and reviewed  Independent visualization of images, tracings, or specimens: yes    Risk of Complications, Morbidity, and/or Mortality  Presenting problems: high  Diagnostic procedures: high  Management options: high    Patient Progress  Patient progress: stable      Final diagnoses:   Chronic bilateral low back pain with bilateral sciatica   Preseptal cellulitis of left eye   Hordeolum externum of left upper eyelid            Jessie Kumar DO  07/28/20 1259

## 2021-03-15 ENCOUNTER — BULK ORDERING (OUTPATIENT)
Dept: CASE MANAGEMENT | Facility: OTHER | Age: 51
End: 2021-03-15

## 2021-03-15 DIAGNOSIS — Z23 IMMUNIZATION DUE: ICD-10-CM

## 2021-08-12 ENCOUNTER — HOSPITAL ENCOUNTER (EMERGENCY)
Facility: HOSPITAL | Age: 51
Discharge: LEFT BEFORE DISCHARGE | End: 2021-08-12
Admitting: PHYSICIAN ASSISTANT

## 2021-08-12 ENCOUNTER — APPOINTMENT (OUTPATIENT)
Dept: CT IMAGING | Facility: HOSPITAL | Age: 51
End: 2021-08-12

## 2021-08-12 VITALS
RESPIRATION RATE: 18 BRPM | DIASTOLIC BLOOD PRESSURE: 108 MMHG | WEIGHT: 285 LBS | HEART RATE: 98 BPM | SYSTOLIC BLOOD PRESSURE: 164 MMHG | OXYGEN SATURATION: 94 % | HEIGHT: 70 IN | BODY MASS INDEX: 40.8 KG/M2 | TEMPERATURE: 97.8 F

## 2021-08-12 LAB
ALBUMIN SERPL-MCNC: 4.17 G/DL (ref 3.5–5.2)
ALBUMIN/GLOB SERPL: 1.3 G/DL
ALP SERPL-CCNC: 128 U/L (ref 39–117)
ALT SERPL W P-5'-P-CCNC: 52 U/L (ref 1–41)
ANION GAP SERPL CALCULATED.3IONS-SCNC: 10.3 MMOL/L (ref 5–15)
AST SERPL-CCNC: 45 U/L (ref 1–40)
BASOPHILS # BLD AUTO: 0 10*3/MM3 (ref 0–0.2)
BASOPHILS NFR BLD AUTO: 0 % (ref 0–1.5)
BILIRUB SERPL-MCNC: 0.4 MG/DL (ref 0–1.2)
BUN SERPL-MCNC: 13 MG/DL (ref 6–20)
BUN/CREAT SERPL: 14.9 (ref 7–25)
CALCIUM SPEC-SCNC: 9.1 MG/DL (ref 8.6–10.5)
CHLORIDE SERPL-SCNC: 101 MMOL/L (ref 98–107)
CO2 SERPL-SCNC: 22.7 MMOL/L (ref 22–29)
CREAT SERPL-MCNC: 0.87 MG/DL (ref 0.76–1.27)
DEPRECATED RDW RBC AUTO: 40.4 FL (ref 37–54)
EOSINOPHIL # BLD AUTO: 0.01 10*3/MM3 (ref 0–0.4)
EOSINOPHIL NFR BLD AUTO: 0.2 % (ref 0.3–6.2)
ERYTHROCYTE [DISTWIDTH] IN BLOOD BY AUTOMATED COUNT: 13.5 % (ref 12.3–15.4)
GFR SERPL CREATININE-BSD FRML MDRD: 93 ML/MIN/1.73
GLOBULIN UR ELPH-MCNC: 3.1 GM/DL
GLUCOSE SERPL-MCNC: 127 MG/DL (ref 65–99)
HCT VFR BLD AUTO: 53.1 % (ref 37.5–51)
HGB BLD-MCNC: 17.3 G/DL (ref 13–17.7)
HOLD SPECIMEN: NORMAL
HOLD SPECIMEN: NORMAL
IMM GRANULOCYTES # BLD AUTO: 0.01 10*3/MM3 (ref 0–0.05)
IMM GRANULOCYTES NFR BLD AUTO: 0.2 % (ref 0–0.5)
LYMPHOCYTES # BLD AUTO: 1.01 10*3/MM3 (ref 0.7–3.1)
LYMPHOCYTES NFR BLD AUTO: 17.9 % (ref 19.6–45.3)
MCH RBC QN AUTO: 27.2 PG (ref 26.6–33)
MCHC RBC AUTO-ENTMCNC: 32.6 G/DL (ref 31.5–35.7)
MCV RBC AUTO: 83.6 FL (ref 79–97)
MONOCYTES # BLD AUTO: 0.46 10*3/MM3 (ref 0.1–0.9)
MONOCYTES NFR BLD AUTO: 8.1 % (ref 5–12)
NEUTROPHILS NFR BLD AUTO: 4.16 10*3/MM3 (ref 1.7–7)
NEUTROPHILS NFR BLD AUTO: 73.6 % (ref 42.7–76)
NRBC BLD AUTO-RTO: 0 /100 WBC (ref 0–0.2)
PLATELET # BLD AUTO: 150 10*3/MM3 (ref 140–450)
PMV BLD AUTO: 9.1 FL (ref 6–12)
POTASSIUM SERPL-SCNC: 3.8 MMOL/L (ref 3.5–5.2)
PROT SERPL-MCNC: 7.3 G/DL (ref 6–8.5)
RBC # BLD AUTO: 6.35 10*6/MM3 (ref 4.14–5.8)
SODIUM SERPL-SCNC: 134 MMOL/L (ref 136–145)
WBC # BLD AUTO: 5.65 10*3/MM3 (ref 3.4–10.8)
WHOLE BLOOD HOLD SPECIMEN: NORMAL

## 2021-08-12 PROCEDURE — 80053 COMPREHEN METABOLIC PANEL: CPT | Performed by: PHYSICIAN ASSISTANT

## 2021-08-12 PROCEDURE — 99282 EMERGENCY DEPT VISIT SF MDM: CPT

## 2021-08-12 PROCEDURE — 36415 COLL VENOUS BLD VENIPUNCTURE: CPT

## 2021-08-12 PROCEDURE — 85025 COMPLETE CBC W/AUTO DIFF WBC: CPT | Performed by: PHYSICIAN ASSISTANT

## 2021-08-12 PROCEDURE — 99211 OFF/OP EST MAY X REQ PHY/QHP: CPT

## 2021-08-12 PROCEDURE — 99281 EMR DPT VST MAYX REQ PHY/QHP: CPT

## 2021-08-14 ENCOUNTER — HOSPITAL ENCOUNTER (EMERGENCY)
Dept: HOSPITAL 79 - ER1 | Age: 51
Discharge: HOME | End: 2021-08-14
Payer: COMMERCIAL

## 2021-08-14 DIAGNOSIS — E11.9: ICD-10-CM

## 2021-08-14 DIAGNOSIS — U07.1: Primary | ICD-10-CM

## 2021-08-14 DIAGNOSIS — C73: ICD-10-CM

## 2021-08-14 LAB
BUN/CREATININE RATIO: 18 (ref 0–10)
HGB BLD-MCNC: 17.7 GM/DL (ref 14–17.5)
RED BLOOD COUNT: 6.19 M/UL (ref 4.2–5.5)
WHITE BLOOD COUNT: 5.6 K/UL (ref 4.5–11)

## 2021-08-14 PROCEDURE — U0002 COVID-19 LAB TEST NON-CDC: HCPCS

## 2022-04-17 ENCOUNTER — HOSPITAL ENCOUNTER (EMERGENCY)
Facility: HOSPITAL | Age: 52
Discharge: HOME | End: 2022-04-17
Attending: EMERGENCY MEDICINE | Admitting: EMERGENCY MEDICINE

## 2022-04-17 ENCOUNTER — APPOINTMENT (OUTPATIENT)
Dept: CT IMAGING | Facility: HOSPITAL | Age: 52
End: 2022-04-17

## 2022-04-17 VITALS
HEIGHT: 70 IN | SYSTOLIC BLOOD PRESSURE: 140 MMHG | HEART RATE: 88 BPM | RESPIRATION RATE: 14 BRPM | OXYGEN SATURATION: 99 % | BODY MASS INDEX: 40.09 KG/M2 | WEIGHT: 280 LBS | DIASTOLIC BLOOD PRESSURE: 80 MMHG | TEMPERATURE: 98.4 F

## 2022-04-17 DIAGNOSIS — N20.1 URETEROLITHIASIS: Primary | ICD-10-CM

## 2022-04-17 LAB
ALBUMIN SERPL-MCNC: 4.07 G/DL (ref 3.5–5.2)
ALBUMIN/GLOB SERPL: 1.2 G/DL
ALP SERPL-CCNC: 84 U/L (ref 39–117)
ALT SERPL W P-5'-P-CCNC: 43 U/L (ref 1–41)
ANION GAP SERPL CALCULATED.3IONS-SCNC: 10.3 MMOL/L (ref 5–15)
AST SERPL-CCNC: 33 U/L (ref 1–40)
BACTERIA UR QL AUTO: ABNORMAL /HPF
BASOPHILS # BLD AUTO: 0.01 10*3/MM3 (ref 0–0.2)
BASOPHILS NFR BLD AUTO: 0.2 % (ref 0–1.5)
BILIRUB SERPL-MCNC: 0.5 MG/DL (ref 0–1.2)
BILIRUB UR QL STRIP: ABNORMAL
BUN SERPL-MCNC: 15 MG/DL (ref 6–20)
BUN/CREAT SERPL: 14.6 (ref 7–25)
CALCIUM SPEC-SCNC: 9.1 MG/DL (ref 8.6–10.5)
CHLORIDE SERPL-SCNC: 98 MMOL/L (ref 98–107)
CLARITY UR: CLEAR
CO2 SERPL-SCNC: 24.7 MMOL/L (ref 22–29)
COD CRY URNS QL: ABNORMAL /HPF
COLOR UR: ABNORMAL
CREAT SERPL-MCNC: 1.03 MG/DL (ref 0.76–1.27)
CRP SERPL-MCNC: 6.14 MG/DL (ref 0–0.5)
DEPRECATED RDW RBC AUTO: 38 FL (ref 37–54)
EGFRCR SERPLBLD CKD-EPI 2021: 89 ML/MIN/1.73
EOSINOPHIL # BLD AUTO: 0.02 10*3/MM3 (ref 0–0.4)
EOSINOPHIL NFR BLD AUTO: 0.3 % (ref 0.3–6.2)
ERYTHROCYTE [DISTWIDTH] IN BLOOD BY AUTOMATED COUNT: 12.4 % (ref 12.3–15.4)
GLOBULIN UR ELPH-MCNC: 3.4 GM/DL
GLUCOSE SERPL-MCNC: 163 MG/DL (ref 65–99)
GLUCOSE UR STRIP-MCNC: ABNORMAL MG/DL
GRAN CASTS URNS QL MICRO: ABNORMAL /LPF
HCT VFR BLD AUTO: 46.4 % (ref 37.5–51)
HGB BLD-MCNC: 15.9 G/DL (ref 13–17.7)
HGB UR QL STRIP.AUTO: ABNORMAL
HYALINE CASTS UR QL AUTO: ABNORMAL /LPF
IMM GRANULOCYTES # BLD AUTO: 0.01 10*3/MM3 (ref 0–0.05)
IMM GRANULOCYTES NFR BLD AUTO: 0.2 % (ref 0–0.5)
KETONES UR QL STRIP: ABNORMAL
LEUKOCYTE ESTERASE UR QL STRIP.AUTO: NEGATIVE
LIPASE SERPL-CCNC: 17 U/L (ref 13–60)
LYMPHOCYTES # BLD AUTO: 0.86 10*3/MM3 (ref 0.7–3.1)
LYMPHOCYTES NFR BLD AUTO: 14.1 % (ref 19.6–45.3)
MCH RBC QN AUTO: 29 PG (ref 26.6–33)
MCHC RBC AUTO-ENTMCNC: 34.3 G/DL (ref 31.5–35.7)
MCV RBC AUTO: 84.5 FL (ref 79–97)
MONOCYTES # BLD AUTO: 0.6 10*3/MM3 (ref 0.1–0.9)
MONOCYTES NFR BLD AUTO: 9.8 % (ref 5–12)
MUCOUS THREADS URNS QL MICRO: ABNORMAL /HPF
NEUTROPHILS NFR BLD AUTO: 4.61 10*3/MM3 (ref 1.7–7)
NEUTROPHILS NFR BLD AUTO: 75.4 % (ref 42.7–76)
NITRITE UR QL STRIP: NEGATIVE
NRBC BLD AUTO-RTO: 0 /100 WBC (ref 0–0.2)
PH UR STRIP.AUTO: <=5 [PH] (ref 5–8)
PLATELET # BLD AUTO: 167 10*3/MM3 (ref 140–450)
PMV BLD AUTO: 9.2 FL (ref 6–12)
POTASSIUM SERPL-SCNC: 3.8 MMOL/L (ref 3.5–5.2)
PROT SERPL-MCNC: 7.5 G/DL (ref 6–8.5)
PROT UR QL STRIP: ABNORMAL
RBC # BLD AUTO: 5.49 10*6/MM3 (ref 4.14–5.8)
RBC # UR STRIP: ABNORMAL /HPF
REF LAB TEST METHOD: ABNORMAL
SODIUM SERPL-SCNC: 133 MMOL/L (ref 136–145)
SP GR UR STRIP: >1.03 (ref 1–1.03)
SQUAMOUS #/AREA URNS HPF: ABNORMAL /HPF
TROPONIN T SERPL-MCNC: <0.01 NG/ML (ref 0–0.03)
UROBILINOGEN UR QL STRIP: ABNORMAL
WBC # UR STRIP: ABNORMAL /HPF
WBC NRBC COR # BLD: 6.11 10*3/MM3 (ref 3.4–10.8)

## 2022-04-17 PROCEDURE — 86140 C-REACTIVE PROTEIN: CPT | Performed by: PHYSICIAN ASSISTANT

## 2022-04-17 PROCEDURE — 25010000002 HYDROMORPHONE 1 MG/ML SOLUTION: Performed by: EMERGENCY MEDICINE

## 2022-04-17 PROCEDURE — 83690 ASSAY OF LIPASE: CPT | Performed by: PHYSICIAN ASSISTANT

## 2022-04-17 PROCEDURE — 84484 ASSAY OF TROPONIN QUANT: CPT | Performed by: PHYSICIAN ASSISTANT

## 2022-04-17 PROCEDURE — 25010000002 IOPAMIDOL 61 % SOLUTION: Performed by: EMERGENCY MEDICINE

## 2022-04-17 PROCEDURE — 93005 ELECTROCARDIOGRAM TRACING: CPT | Performed by: PHYSICIAN ASSISTANT

## 2022-04-17 PROCEDURE — 93010 ELECTROCARDIOGRAM REPORT: CPT | Performed by: INTERNAL MEDICINE

## 2022-04-17 PROCEDURE — 80053 COMPREHEN METABOLIC PANEL: CPT | Performed by: PHYSICIAN ASSISTANT

## 2022-04-17 PROCEDURE — 99283 EMERGENCY DEPT VISIT LOW MDM: CPT

## 2022-04-17 PROCEDURE — 96374 THER/PROPH/DIAG INJ IV PUSH: CPT

## 2022-04-17 PROCEDURE — 25010000002 HYDROMORPHONE 1 MG/ML SOLUTION: Performed by: STUDENT IN AN ORGANIZED HEALTH CARE EDUCATION/TRAINING PROGRAM

## 2022-04-17 PROCEDURE — 81001 URINALYSIS AUTO W/SCOPE: CPT | Performed by: PHYSICIAN ASSISTANT

## 2022-04-17 PROCEDURE — 74177 CT ABD & PELVIS W/CONTRAST: CPT

## 2022-04-17 PROCEDURE — 96376 TX/PRO/DX INJ SAME DRUG ADON: CPT

## 2022-04-17 PROCEDURE — 85025 COMPLETE CBC W/AUTO DIFF WBC: CPT | Performed by: PHYSICIAN ASSISTANT

## 2022-04-17 RX ORDER — SODIUM CHLORIDE 0.9 % (FLUSH) 0.9 %
10 SYRINGE (ML) INJECTION AS NEEDED
Status: DISCONTINUED | OUTPATIENT
Start: 2022-04-17 | End: 2022-04-17 | Stop reason: HOSPADM

## 2022-04-17 RX ORDER — CEFDINIR 300 MG/1
300 CAPSULE ORAL 2 TIMES DAILY
Qty: 14 CAPSULE | Refills: 0 | Status: SHIPPED | OUTPATIENT
Start: 2022-04-17 | End: 2022-04-24

## 2022-04-17 RX ORDER — TAMSULOSIN HYDROCHLORIDE 0.4 MG/1
1 CAPSULE ORAL DAILY
Qty: 5 CAPSULE | Refills: 0 | Status: SHIPPED | OUTPATIENT
Start: 2022-04-17 | End: 2022-04-22

## 2022-04-17 RX ORDER — KETOROLAC TROMETHAMINE 10 MG/1
10 TABLET, FILM COATED ORAL EVERY 6 HOURS PRN
Qty: 12 TABLET | Refills: 0 | Status: SHIPPED | OUTPATIENT
Start: 2022-04-17

## 2022-04-17 RX ADMIN — SODIUM CHLORIDE 1000 ML: 9 INJECTION, SOLUTION INTRAVENOUS at 18:38

## 2022-04-17 RX ADMIN — HYDROMORPHONE HYDROCHLORIDE 1 MG: 1 INJECTION, SOLUTION INTRAMUSCULAR; INTRAVENOUS; SUBCUTANEOUS at 20:17

## 2022-04-17 RX ADMIN — IOPAMIDOL 87 ML: 612 INJECTION, SOLUTION INTRAVENOUS at 19:43

## 2022-04-17 RX ADMIN — HYDROMORPHONE HYDROCHLORIDE 1 MG: 1 INJECTION, SOLUTION INTRAMUSCULAR; INTRAVENOUS; SUBCUTANEOUS at 18:38

## 2022-04-17 NOTE — ED PROVIDER NOTES
Subjective   Patient is a 51-year-old male with diabetes, GERD, and history of kidney stones that presents to the ED via EMS for right-sided flank pain radiating into the right lower quadrant.  He describes it as a throbbing aching-like pain.  He states he is also had associated nausea and vomiting.  He states symptoms have been ongoing now for about 3 or 4 days.  He denies chest pain, shortness of breath, fever, chills, headache, dizziness, dysuria, diarrhea, or constipation.      History provided by:  Patient   used: No        Review of Systems   Constitutional: Negative.  Negative for chills, diaphoresis, fatigue and fever.   HENT: Negative.  Negative for congestion, drooling, ear discharge, ear pain, facial swelling, postnasal drip, rhinorrhea, sinus pressure, sinus pain, sneezing, sore throat, tinnitus and trouble swallowing.    Eyes: Negative.  Negative for photophobia, pain, discharge, redness, itching and visual disturbance.   Respiratory: Negative.  Negative for cough, shortness of breath and wheezing.    Cardiovascular: Negative.  Negative for chest pain.   Gastrointestinal: Positive for abdominal pain, nausea and vomiting. Negative for abdominal distention and diarrhea.   Endocrine: Negative.    Genitourinary: Positive for flank pain. Negative for difficulty urinating and dysuria.   Musculoskeletal: Negative for arthralgias, back pain, gait problem, joint swelling, myalgias, neck pain and neck stiffness.   Skin: Negative.    Neurological: Negative.  Negative for dizziness, tremors, seizures, syncope, facial asymmetry, speech difficulty, weakness, light-headedness, numbness and headaches.   Psychiatric/Behavioral: Negative.  Negative for confusion.   All other systems reviewed and are negative.      Past Medical History:   Diagnosis Date   • Back pain    • Cancer (CMS/HCC)     PAROTID CANCER   • Degenerative joint disease    • Diabetes mellitus (CMS/HCC)    • DVT, lower extremity  (CMS/HCC)     left    • GERD (gastroesophageal reflux disease)    • Kidney stones    • Neuropathy     FEET    • Peroneal DVT (deep venous thrombosis)     in  and dvt    • Pulmonary embolism (CMS/[facility])    • Spinal stenosis    • Urolithiasis    • Vertigo     OCASSIONALLY       Allergies   Allergen Reactions   • Morphine Seizure       Past Surgical History:   Procedure Laterality Date   • COLONOSCOPY     • CYSTOSCOPY W/ LITHOLAPAXY / EHL     • INNER EAR SURGERY     • OTHER SURGICAL HISTORY      Mass removed from left side of face   • VASECTOMY Bilateral 3/4/2020    Procedure: VASECTOMY;  Surgeon: Angel Adkins MD;  Location: [facility];  Service: Urology;  Laterality: Bilateral;       Family History   Problem Relation Age of Onset   • Other Other         deveral siblings havd had early onset DVTs   • No Known Problems Mother    • No Known Problems Father        Social History     Socioeconomic History   • Marital status:    Tobacco Use   • Smoking status: Former Smoker     Years: 20.00     Quit date:      Years since quittin.3   • Smokeless tobacco: Current User     Types: Chew   • Tobacco comment: occasionally uses chew once a year    Substance and Sexual Activity   • Alcohol use: No     Comment: beer occasionally   • Drug use: No   • Sexual activity: Defer           Objective   Physical Exam  Vitals and nursing note reviewed.   Constitutional:       General: He is not in acute distress.     Appearance: He is well-developed. He is not diaphoretic.   HENT:      Head: Normocephalic and atraumatic.      Right Ear: External ear normal.      Left Ear: External ear normal.      Nose: Nose normal.      Mouth/Throat:      Mouth: Mucous membranes are moist.      Pharynx: Oropharynx is clear.   Eyes:      Extraocular Movements: Extraocular movements intact.      Conjunctiva/sclera: Conjunctivae normal.      Pupils: Pupils are equal, round, and reactive to light.   Neck:      Vascular: No JVD.       Trachea: No tracheal deviation.   Cardiovascular:      Rate and Rhythm: Normal rate and regular rhythm.      Pulses: Normal pulses.      Heart sounds: Normal heart sounds. No murmur heard.  Pulmonary:      Effort: Pulmonary effort is normal. No respiratory distress.      Breath sounds: Normal breath sounds. No wheezing.   Abdominal:      General: Bowel sounds are normal. There is no distension.      Palpations: Abdomen is soft.      Tenderness: There is abdominal tenderness in the right lower quadrant. There is right CVA tenderness. There is no left CVA tenderness, guarding or rebound.      Hernia: No hernia is present.   Musculoskeletal:         General: No deformity. Normal range of motion.      Cervical back: Normal range of motion and neck supple.      Right lower leg: No edema.      Left lower leg: No edema.   Skin:     General: Skin is warm and dry.      Coloration: Skin is not pale.      Findings: No erythema or rash.   Neurological:      General: No focal deficit present.      Mental Status: He is alert and oriented to person, place, and time.      Cranial Nerves: No cranial nerve deficit.   Psychiatric:         Mood and Affect: Mood normal.         Behavior: Behavior normal.         Thought Content: Thought content normal.         Procedures           ED Course  ED Course as of 04/17/22 2046   Sun Apr 17, 2022   1859 EKG normal sinus rhythm ventricular 83  QRS 98  no ST elevation. [BB]   2001 CT Abdomen Pelvis With Contrast  IMPRESSION:  1. Incidental patchy infiltrate in the left lower lobe. Correlate clinically.        2. Acute obstructive uropathy on the right due to a 4 mm stone that projects in the bladder just beyond the UV junction. This may be recently passed. Blood suspected within the right renal collecting system.  .  3. Splenomegaly and hepatic steatosis. Correlate with LFTs.     Signer Name: Danika Foote MD   Signed: 4/17/2022 7:57 PM   Workstation Name: Reissued  [MH]    2046 Discussed plan of care with patient.  Advised if symptoms worsen return to ED.  Advised follow-up with urology in 1 to 2 days. [MH]      ED Course User Index  [BB] Yon Wagner MD  [] Shea Smith PA-C                                                 [facility]    Final diagnoses:   Ureterolithiasis       ED Disposition  ED Disposition     ED Disposition   Discharge    Condition   Stable    Comment   --             Angel Adkins MD  [address] [facility] [address]  [place] [id number]  [phone]    Schedule an appointment as soon as possible for a visit in 1 day           Medication List      New Prescriptions    tamsulosin 0.4 MG capsule 24 hr capsule  Commonly known as: FLOMAX  Take 1 capsule by mouth Daily for 5 days.        Stop    clindamycin 300 MG capsule  Commonly known as: CLEOCIN     erythromycin 5 MG/GM ophthalmic ointment  Commonly known as: ROMYCIN           Where to Get Your Medications      You can get these medications from any pharmacy    Bring a paper prescription for each of these medications  · cefdinir 300 MG capsule  · tamsulosin 0.4 MG capsule 24 hr capsule          Shea Smith PA-C  04/17/22 2046

## 2022-04-17 NOTE — ED NOTES
"GENET Sauceda attempted to start IV x3 with no success. Patient requesting that we give him a break at this time because he does not want to be a \"pin cushion\".   "

## 2022-04-18 LAB
QT INTERVAL: 380 MS
QTC INTERVAL: 446 MS

## 2023-04-24 ENCOUNTER — OFFICE VISIT (OUTPATIENT)
Dept: UROLOGY | Facility: CLINIC | Age: 53
End: 2023-04-24
Payer: COMMERCIAL

## 2023-04-24 VITALS
SYSTOLIC BLOOD PRESSURE: 127 MMHG | HEIGHT: 70 IN | BODY MASS INDEX: 40.09 KG/M2 | DIASTOLIC BLOOD PRESSURE: 79 MMHG | WEIGHT: 280 LBS | HEART RATE: 77 BPM

## 2023-04-24 DIAGNOSIS — R79.89 LOW TESTOSTERONE IN MALE: ICD-10-CM

## 2023-04-24 DIAGNOSIS — N42.9 DISORDER OF PROSTATE: Primary | ICD-10-CM

## 2023-04-24 DIAGNOSIS — N48.6 PEYRONIE'S DISEASE: ICD-10-CM

## 2023-04-24 PROCEDURE — 1160F RVW MEDS BY RX/DR IN RCRD: CPT | Performed by: UROLOGY

## 2023-04-24 PROCEDURE — 3074F SYST BP LT 130 MM HG: CPT | Performed by: UROLOGY

## 2023-04-24 PROCEDURE — 3078F DIAST BP <80 MM HG: CPT | Performed by: UROLOGY

## 2023-04-24 PROCEDURE — 1159F MED LIST DOCD IN RCRD: CPT | Performed by: UROLOGY

## 2023-04-24 PROCEDURE — 82670 ASSAY OF TOTAL ESTRADIOL: CPT | Performed by: UROLOGY

## 2023-04-24 PROCEDURE — 99214 OFFICE O/P EST MOD 30 MIN: CPT | Performed by: UROLOGY

## 2023-04-24 PROCEDURE — 84153 ASSAY OF PSA TOTAL: CPT | Performed by: UROLOGY

## 2023-04-24 PROCEDURE — 85027 COMPLETE CBC AUTOMATED: CPT | Performed by: UROLOGY

## 2023-04-24 PROCEDURE — 84403 ASSAY OF TOTAL TESTOSTERONE: CPT | Performed by: UROLOGY

## 2023-04-24 RX ORDER — BISOPROLOL FUMARATE AND HYDROCHLOROTHIAZIDE 5; 6.25 MG/1; MG/1
1 TABLET ORAL DAILY
COMMUNITY
Start: 2023-04-18

## 2023-04-24 RX ORDER — TAMSULOSIN HYDROCHLORIDE 0.4 MG/1
1 CAPSULE ORAL NIGHTLY
Qty: 30 CAPSULE | Refills: 5 | Status: SHIPPED | OUTPATIENT
Start: 2023-04-24

## 2023-04-24 RX ORDER — PANTOPRAZOLE SODIUM 40 MG/1
TABLET, DELAYED RELEASE ORAL
COMMUNITY
Start: 2023-04-18 | End: 2023-04-24 | Stop reason: ALTCHOICE

## 2023-04-24 RX ORDER — SEMAGLUTIDE 1.34 MG/ML
INJECTION, SOLUTION SUBCUTANEOUS
COMMUNITY
Start: 2023-04-18

## 2023-04-24 RX ORDER — OXYCODONE HYDROCHLORIDE 10 MG/1
TABLET, FILM COATED, EXTENDED RELEASE ORAL
COMMUNITY
Start: 2023-04-18

## 2023-04-24 RX ORDER — TESTOSTERONE CYPIONATE 200 MG/ML
INJECTION, SOLUTION INTRAMUSCULAR
Qty: 10 ML | Refills: 2 | Status: SHIPPED | OUTPATIENT
Start: 2023-04-24

## 2023-04-24 RX ORDER — TAMSULOSIN HYDROCHLORIDE 0.4 MG/1
CAPSULE ORAL
COMMUNITY
Start: 2023-04-18

## 2023-04-24 NOTE — PROGRESS NOTES
"Chief Complaint:      Chief Complaint   Patient presents with   • LOW TESTOSTERONE        HPI:   52 y.o. male patient returns today for follow-up.  He has been on testosterone replacement therapy.  He reports a dramatic improvement in his VINCENT questionnaire: -VINCENT-androgen deficiency in the age male questionnaire. The patient was queried regarding the androgen deficiency in the age male questionnaire.  This is a validated questionnaire that was performed on a set of 314 [place] male physicians. When it was positive it correlated directly with a 94% chance of low testosterone.  Patient indicates there is a decrease in libido or sex drive, a lack of energy, decreased  strength and endurance, a decreased \"enjoyment of life\", sad and grumpy feelings with significant difficulty maintaining erections.  There has also been a recent deterioration regarding work performance. He reports weight loss.  He has good facility and the use of subcutaneous and intramuscular injections as well as comfort level and using the medication in a sterile fashion.  He understands he should use only the prescribed dose.  He is here for appropriate lab monitoring regarding this.  He understands this is a controlled substance and therefore must be watched closely, will not be refilled in the medical loss or miscalculation of the dose.  He is very happy with the treatment and therefore wants to continue it.    Past Medical History:     Past Medical History:   Diagnosis Date   • Back pain    • Cancer     PAROTID CANCER   • Degenerative joint disease    • Diabetes mellitus    • DVT, lower extremity     left 2013   • GERD (gastroesophageal reflux disease)    • Kidney stones    • Neuropathy     FEET    • Peroneal DVT (deep venous thrombosis)     in 1998 and dvt 2010   • Pulmonary embolism    • Spinal stenosis    • Urolithiasis    • Vertigo     OCASSIONALLY       Current Meds:     Current Outpatient Medications   Medication Sig Dispense Refill   • " "bisoprolol-hydrochlorothiazide (ZIAC) 5-6.25 MG per tablet Take 1 tablet by mouth Daily. as directed     • butalbital-acetaminophen-caffeine (FIORICET, ESGIC) -40 MG per tablet      • cyclobenzaprine (FLEXERIL) 10 MG tablet Take 1 tablet by mouth 3 (Three) Times a Day As Needed for Muscle Spasms.     • gabapentin (NEURONTIN) 800 MG tablet 4 (Four) Times a Day.     • Insulin Glargine (BASAGLAR KWIKPEN SC) Inject 55 Units under the skin into the appropriate area as directed Every Evening.     • ketorolac (TORADOL) 10 MG tablet Take 1 tablet by mouth Every 6 (Six) Hours As Needed for Mild Pain . 12 tablet 0   • meclizine (ANTIVERT) 25 MG tablet Take 1 tablet by mouth 3 (Three) Times a Day As Needed for Dizziness. 20 tablet 0   • mometasone (Nasonex) 50 MCG/ACT nasal spray 2 sprays into the nostril(s) as directed by provider Daily. 17 g 0   • omeprazole (PriLOSEC) 20 MG capsule      • oxyCODONE-acetaminophen (PERCOCET)  MG per tablet Take 1 tablet by mouth Every 6 (Six) Hours As Needed.     • oxyCODONE-acetaminophen (PERCOCET)  MG per tablet Take 1 tablet by mouth Every 4 (Four) Hours As Needed for Moderate Pain  (Pain). 12 tablet 0   • OxyCONTIN 10 MG 12 hr tablet TAKE ONE TABLET BY MOUTH EVERY NIGHT AT BEDTIME AS NEEDED FOR PAIN *DNF 04/18/23*     • Ozempic, 1 MG/DOSE, 4 MG/3ML solution pen-injector INJECT 1MG UNDER THE SKIN EVERY WEEK AS DIRECTED     • phentermine 37.5 MG capsule Take 1 capsule by mouth Every Morning.     • Syringe 25G X 5/8\" 3 ML misc Use as directed 2 x weekly 24 each 3   • tamsulosin (FLOMAX) 0.4 MG capsule 24 hr capsule TAKE 1 CAPSULE BY MOUTH 30 MINUTES FOLLOWING THE SAME MEAL EACH DAY     • Testosterone Cypionate (DEPO-TESTOSTERONE) 200 MG/ML injection Inject 1/2 cc subcutaneously every Monday and Thursday 10 mL 2   • tiZANidine (ZANAFLEX) 4 MG tablet Take 1 tablet by mouth At Night As Needed for Muscle Spasms.     • tiZANidine (ZANAFLEX) 4 MG tablet Take 1 tablet by mouth At " Night As Needed for Muscle Spasms. 30 tablet 0     No current facility-administered medications for this visit.        Allergies:      Allergies   Allergen Reactions   • Morphine Seizure        Past Surgical History:     Past Surgical History:   Procedure Laterality Date   • COLONOSCOPY     • CYSTOSCOPY W/ LITHOLAPAXY / EHL     • INNER EAR SURGERY     • OTHER SURGICAL HISTORY      Mass removed from left side of face   • VASECTOMY Bilateral 3/4/2020    Procedure: VASECTOMY;  Surgeon: Angel Adkins MD;  Location: [facility];  Service: Urology;  Laterality: Bilateral;       Social History:     Social History     Socioeconomic History   • Marital status:    Tobacco Use   • Smoking status: Former     Years: 20.00     Types: Cigarettes     Quit date:      Years since quittin.3   • Smokeless tobacco: Current     Types: Chew   • Tobacco comments:     occasionally uses chew once a year    Substance and Sexual Activity   • Alcohol use: No     Comment: beer occasionally   • Drug use: No   • Sexual activity: Defer       Family History:     Family History   Problem Relation Age of Onset   • Other Other         deveral siblings havd had early onset DVTs   • No Known Problems Mother    • No Known Problems Father        Review of Systems:     Review of Systems   Constitutional: Negative.    HENT: Negative.    Eyes: Negative.    Respiratory: Negative.    Cardiovascular: Negative.    Gastrointestinal: Negative.    Endocrine: Negative.    Musculoskeletal: Negative.    Allergic/Immunologic: Negative.    Neurological: Negative.    Hematological: Negative.    Psychiatric/Behavioral: Negative.        Physical Exam:     Physical Exam  Vitals and nursing note reviewed.   Constitutional:       Appearance: He is well-developed.   HENT:      Head: Normocephalic and atraumatic.   Eyes:      Conjunctiva/sclera: Conjunctivae normal.      Pupils: Pupils are equal, round, and reactive to light.   Cardiovascular:      Rate  and Rhythm: Normal rate and regular rhythm.      Heart sounds: Normal heart sounds.   Pulmonary:      Effort: Pulmonary effort is normal.      Breath sounds: Normal breath sounds.   Abdominal:      General: Bowel sounds are normal.      Palpations: Abdomen is soft.   Musculoskeletal:         General: Normal range of motion.      Cervical back: Normal range of motion.   Skin:     General: Skin is warm and dry.   Neurological:      Mental Status: He is alert and oriented to person, place, and time.      Deep Tendon Reflexes: Reflexes are normal and symmetric.   Psychiatric:         Behavior: Behavior normal.         Thought Content: Thought content normal.         Judgment: Judgment normal.         I have reviewed the following portions of the patient's history: Allergies, current medications, past family history, past medical history, past social history, past surgical history, problem list, and ROS and confirm it is accurate.    Recent Image (CT and/or KUB):      CT Abdomen and Pelvis: No results found for this or any previous visit.       CT Stone Protocol: Results for orders placed during the hospital encounter of 07/05/19    CT Abdomen Pelvis Stone Protocol    Narrative  CT ABDOMEN PELVIS STONE PROTOCOL-    CLINICAL INDICATION: Flank pain, stone disease suspected      COMPARISON: 02/14/2018    TECHNIQUE: Axial images were acquired from the lung bases through the  pubic symphysis without any IV or oral contrast.  Reformatted images were created in both the coronal and sagittal planes.    Radiation dose reduction techniques were utilized per ALARA protocol.  Automated exposure control was initiated through either or [employer] or  [employer] software packages by  protocol.      FINDINGS:  The lung bases are clear. There are no pleural effusions.    The liver is homogeneous. There is no evidence of focal hepatic mass    The spleen is homogeneous    There is no peripancreatic stranding or pancreatic head  mass.    There is no adrenal enlargement.    There is a proximal left ureteral stone measuring 7 mm. This does cause  hydronephrosis.  Nonobstructing right intrarenal stone. Otherwise I do not see any free  fluid or walled off fluid collections.    There is no bowel wall thickening or mesenteric stranding.    There is no evidence of mesenteric or retroperitoneal adenopathy    Impression  Left-sided hydronephrosis due to a 7 mm proximal left  ureteral stone          This report was finalized on 7/5/2019 8:49 AM by Dr. Rush Amin MD.       KUB: No results found for this or any previous visit.       Labs (past 3 months):      No visits with results within 3 Month(s) from this visit.   Latest known visit with results is:   Admission on 04/17/2022, Discharged on 04/17/2022   Component Date Value Ref Range Status   • Glucose 04/17/2022 163 (H)  65 - 99 mg/dL Final   • BUN 04/17/2022 15  6 - 20 mg/dL Final   • Creatinine 04/17/2022 1.03  0.76 - 1.27 mg/dL Final   • Sodium 04/17/2022 133 (L)  136 - 145 mmol/L Final   • Potassium 04/17/2022 3.8  3.5 - 5.2 mmol/L Final   • Chloride 04/17/2022 98  98 - 107 mmol/L Final   • CO2 04/17/2022 24.7  22.0 - 29.0 mmol/L Final   • Calcium 04/17/2022 9.1  8.6 - 10.5 mg/dL Final   • Total Protein 04/17/2022 7.5  6.0 - 8.5 g/dL Final   • Albumin 04/17/2022 4.07  3.50 - 5.20 g/dL Final   • ALT (SGPT) 04/17/2022 43 (H)  1 - 41 U/L Final   • AST (SGOT) 04/17/2022 33  1 - 40 U/L Final   • Alkaline Phosphatase 04/17/2022 84  39 - 117 U/L Final   • Total Bilirubin 04/17/2022 0.5  0.0 - 1.2 mg/dL Final   • Globulin 04/17/2022 3.4  gm/dL Final   • A/G Ratio 04/17/2022 1.2  g/dL Final   • BUN/Creatinine Ratio 04/17/2022 14.6  7.0 - 25.0 Final   • Anion Gap 04/17/2022 10.3  5.0 - 15.0 mmol/L Final   • eGFR 04/17/2022 89.0  >60.0 mL/min/1.73 Final    National Kidney Foundation and American Society of Nephrology (ASN) Task Force recommended calculation based on the Chronic Kidney Disease  Epidemiology Collaboration (CKD-EPI) equation refit without adjustment for race.   • Lipase 04/17/2022 17  13 - 60 U/L Final   • Color, UA 04/17/2022 Dark Yellow (A)  Yellow, Straw Final   • Appearance, UA 04/17/2022 Clear  Clear Final   • pH, UA 04/17/2022 <=5.0  5.0 - 8.0 Final   • Specific Gravity, UA 04/17/2022 >1.030 (H)  1.005 - 1.030 Final   • Glucose, UA 04/17/2022 >=1000 mg/dL (3+) (A)  Negative Final   • Ketones, UA 04/17/2022 Trace (A)  Negative Final   • Bilirubin, UA 04/17/2022 Small (1+) (A)  Negative Final   • Blood, UA 04/17/2022 Moderate (2+) (A)  Negative Final   • Protein, UA 04/17/2022 30 mg/dL (1+) (A)  Negative Final   • Leuk Esterase, UA 04/17/2022 Negative  Negative Final   • Nitrite, UA 04/17/2022 Negative  Negative Final   • Urobilinogen, UA 04/17/2022 1.0 E.U./dL  0.2 - 1.0 E.U./dL Final   • Troponin T 04/17/2022 <0.010  0.000 - 0.030 ng/mL Final   • QT Interval 04/17/2022 380  ms Final   • QTC Interval 04/17/2022 446  ms Final   • C-Reactive Protein 04/17/2022 6.14 (H)  0.00 - 0.50 mg/dL Final   • WBC 04/17/2022 6.11  3.40 - 10.80 10*3/mm3 Final   • RBC 04/17/2022 5.49  4.14 - 5.80 10*6/mm3 Final   • Hemoglobin 04/17/2022 15.9  13.0 - 17.7 g/dL Final   • Hematocrit 04/17/2022 46.4  37.5 - 51.0 % Final   • MCV 04/17/2022 84.5  79.0 - 97.0 fL Final   • MCH 04/17/2022 29.0  26.6 - 33.0 pg Final   • MCHC 04/17/2022 34.3  31.5 - 35.7 g/dL Final   • RDW 04/17/2022 12.4  12.3 - 15.4 % Final   • RDW-SD 04/17/2022 38.0  37.0 - 54.0 fl Final   • MPV 04/17/2022 9.2  6.0 - 12.0 fL Final   • Platelets 04/17/2022 167  140 - 450 10*3/mm3 Final   • Neutrophil % 04/17/2022 75.4  42.7 - 76.0 % Final   • Lymphocyte % 04/17/2022 14.1 (L)  19.6 - 45.3 % Final   • Monocyte % 04/17/2022 9.8  5.0 - 12.0 % Final   • Eosinophil % 04/17/2022 0.3  0.3 - 6.2 % Final   • Basophil % 04/17/2022 0.2  0.0 - 1.5 % Final   • Immature Grans % 04/17/2022 0.2  0.0 - 0.5 % Final   • Neutrophils, Absolute 04/17/2022 4.61  1.70 -  7.00 10*3/mm3 Final   • Lymphocytes, Absolute 04/17/2022 0.86  0.70 - 3.10 10*3/mm3 Final   • Monocytes, Absolute 04/17/2022 0.60  0.10 - 0.90 10*3/mm3 Final   • Eosinophils, Absolute 04/17/2022 0.02  0.00 - 0.40 10*3/mm3 Final   • Basophils, Absolute 04/17/2022 0.01  0.00 - 0.20 10*3/mm3 Final   • Immature Grans, Absolute 04/17/2022 0.01  0.00 - 0.05 10*3/mm3 Final   • nRBC 04/17/2022 0.0  0.0 - 0.2 /100 WBC Final   • RBC, UA 04/17/2022 6-12 (A)  None Seen, 0-2 /HPF Final   • WBC, UA 04/17/2022 3-5 (A)  None Seen, 0-2 /HPF Final   • Bacteria, UA 04/17/2022 Trace (A)  None Seen /HPF Final   • Squamous Epithelial Cells, UA 04/17/2022 0-2  None Seen, 0-2 /HPF Final   • Hyaline Casts, UA 04/17/2022 None Seen  None Seen /LPF Final   • Granular Casts, UA 04/17/2022 0-2  None Seen /LPF Final   • Calcium Oxalate Crystals, UA 04/17/2022 Small/1+  None Seen /HPF Final   • Mucus, UA 04/17/2022 Moderate/2+ (A)  None Seen, Trace /HPF Final   • Methodology 04/17/2022 Manual Light Microscopy   Final        Procedure:       Assessment/Plan:   Low testosterone: Patient is here for follow-up.  Since beginning the medication, he has been very pleased.  He reports a dramatic improvement in his erections, ability to achieve and maintain an erection, improvement in libido, increase in frequency of morning erections, and a noticeable weight loss consistent with the treatment.   He is going to have appropriate safety laboratory parameters checked.   He understands that the new data implicates testosterone with the development of prostate cancer and this is all but been disproven and the medical literature as well as the risks of cardiovascular disease which has actually also been disproven.  He understands that while he is a candidate for topical therapy if he is in contact with children this is not an option because it has been shown to accentuate genitalia development at an early age that is frequently irreversible.  He also understands  this it is a controlled substance and as such will not be prescribed without appropriate follow-up and appropriate laboratory investigation.  He understands effects on spermatogenesis including the fact that this is not always completely reversible and not always completely limited his ability to father a child.  He has demonstrated facility in the technique of both intramuscular and subcutaneous injection and has been taught sterility when drawing up the medication.    Erectile dysfunction-we discussed the anatomy and physiology of the penis and the endothelium.  We discussed the various forms of erectile dysfunction including peripheral vascular occlusive disease, postoperative, secondary to radiation treatments of the prostate, and arterial inflow.  We discussed the various treatment options available including oral medication and its various forms.  We discussed the use of both generic and non-generic Viagra.  We discussed Cialis and a longer half-life of 17 hours as well as the other 2 medications.  We discussed cost involved with this including the fact that the generic is much cheaper but is taken as multiple pills because they are 20 mg dosages.  We did discuss the other alternatives including penile injections, vacuum erection devices, and surgical intervention reserved for only the most severe cases.  We discussed the need for testosterone in about 20% of cases of erectile dysfunction.  Continue PDE-5 inhibition    PSA testing-I am recommending a PSA blood test that stands for prostate specific antigen.  I discussed the pathophysiology of PSA testing indicating its use in the diagnosis and management of prostate cancer.  I discussed the normal range being 0 to 4, but more appropriately being much closer to 0 to 2 in a normal male.  I discussed the fact that after a certain age we don't recommend PSA testing especially in view of numerous comorbidities, that this will not be a useful test.  I discussed many  of the things that can artificially raise PSA including a recent infection, urinary tract infection, and recent sexual intercourse, or even the type of movement such as manipulation of the prostate from riding a bicycle.  After all this is taken into account when the test is reviewed, the most important use of PSA is the velocity measurement.  In other words, the change of PSA with time is a very important factor in the use and that we look for greater than 20% rise over a year to help us make the prediction of prostate cancer.  I also discussed that the use with prostate cancer indicating that after a radical prostatectomy, the PSA should be 0 and any rise indicates an early biochemical recurrence.    Hyperestrogenism-we spoke about the role of estrogen metabolism and breakdown in the  presence of testosterone replacement therapy.  We spoke about how high estradiol levels can interfere with the improvement noted in a man on testosterone as well as significant side effects such as pseudogynecomastia.  We discussed the use of the medication Arimidex used in an off label setting and using a very judicious low-dose fashion to prevent too low of an estradiol which would precipitate bone complications.  Going to check an estradiol level.  He is at higher risk for breast problems due to his replacement    Polycythemia-I am going to check a CBC to rule out hemoglobin changes.  We utilized the American Heart Association guidelines for polycythemia which is a hemoglobin greater than 18 and a hematocrit greater than 54.5.  Recommend therapeutic phlebotomy as the treatment.  It is important that we indicate that is the most likely cause of the polycythemia.  We also discussed the possibility of decreasing the dose of testosterone and of stopping it altogether.    Controlled substance-he understands this is a controlled substance and as such is regulated under the state.  I cannot refill it outside the prescribed window.  I  stressed the importance of follow-up and appropriate laboratory parameters monitoring.    Liver function tests-according to the AUA guidelines we will not check liver functions due to the fact this is not an oral alkylated testosterone.    Peyronie's Disease - we discussed the pathophysiology of this at length.  I went ahead and harmeet a diagram showing the disease status and how it has about an 18% association with a congenital condition called Dupuytren's contracture which is very much an inherited disease but is seen in men at the average age in the 50s and is associated many times with diabetes.  However, the vast majority of this disease is related to penile trauma especially in the mid 50s when testosterone levels are dropping and a rockhard erection is not an option causing bending at the flexion point of the penis.  We discussed treatment options including the use of Xiaflex, which is clostridial collagenase causing a dissolution of the plaque.  This is especially useful single bend, however, we also discussed penile traction devices, vacuum pumps, and the use of low-dose PDE-5 inhibitors have been effective. We also discussed the significant foreshortening of the penis which is a hallmark of the disease. This is a very difficult situation for him and we will initiate aggressive therapy as his wife is very interested in resuming sexual intercourse.  He also discussed the various antiquated therapies including the use of colchicine, PABA, verapamil, and vitamin E therapy.          This document has been electronically signed by JOSE A SILVEIRA MD April 24, 2023 14:11 EDT    Dictated Utilizing Dragon Dictation: Part of this note may be an electronic transcription/translation of spoken language to printed text using the Dragon Dictation System.

## 2023-04-25 PROBLEM — N48.6 PEYRONIE'S DISEASE: Status: ACTIVE | Noted: 2023-04-25

## 2023-04-25 LAB
DEPRECATED RDW RBC AUTO: 41.3 FL (ref 37–54)
ERYTHROCYTE [DISTWIDTH] IN BLOOD BY AUTOMATED COUNT: 13.3 % (ref 12.3–15.4)
ESTRADIOL SERPL HS-MCNC: 34.3 PG/ML
HCT VFR BLD AUTO: 51.4 % (ref 37.5–51)
HGB BLD-MCNC: 17.3 G/DL (ref 13–17.7)
MCH RBC QN AUTO: 28.6 PG (ref 26.6–33)
MCHC RBC AUTO-ENTMCNC: 33.7 G/DL (ref 31.5–35.7)
MCV RBC AUTO: 85 FL (ref 79–97)
PLATELET # BLD AUTO: 224 10*3/MM3 (ref 140–450)
PMV BLD AUTO: 11 FL (ref 6–12)
PSA SERPL-MCNC: 0.97 NG/ML (ref 0–4)
RBC # BLD AUTO: 6.05 10*6/MM3 (ref 4.14–5.8)
TESTOST SERPL-MCNC: 150 NG/DL (ref 193–740)
WBC NRBC COR # BLD: 7.98 10*3/MM3 (ref 3.4–10.8)

## 2023-09-22 ENCOUNTER — TRANSCRIBE ORDERS (OUTPATIENT)
Dept: ADMINISTRATIVE | Facility: HOSPITAL | Age: 53
End: 2023-09-22
Payer: COMMERCIAL

## 2023-09-22 DIAGNOSIS — R60.0 EDEMA OF LEFT LOWER LEG: Primary | ICD-10-CM

## 2023-09-25 ENCOUNTER — HOSPITAL ENCOUNTER (OUTPATIENT)
Dept: CARDIOLOGY | Facility: HOSPITAL | Age: 53
Discharge: HOME | End: 2023-09-25
Admitting: PHYSICIAN ASSISTANT
Payer: COMMERCIAL

## 2023-09-25 DIAGNOSIS — R60.0 EDEMA OF LEFT LOWER LEG: ICD-10-CM

## 2023-09-25 PROCEDURE — 93971 EXTREMITY STUDY: CPT | Performed by: RADIOLOGY

## 2023-09-25 PROCEDURE — 93971 EXTREMITY STUDY: CPT

## 2023-12-17 ENCOUNTER — HOSPITAL ENCOUNTER (EMERGENCY)
Facility: HOSPITAL | Age: 53
Discharge: HOME | End: 2023-12-17
Attending: EMERGENCY MEDICINE | Admitting: EMERGENCY MEDICINE
Payer: COMMERCIAL

## 2023-12-17 ENCOUNTER — APPOINTMENT (OUTPATIENT)
Dept: GENERAL RADIOLOGY | Facility: HOSPITAL | Age: 53
End: 2023-12-17
Payer: COMMERCIAL

## 2023-12-17 VITALS
TEMPERATURE: 98.4 F | WEIGHT: 270 LBS | BODY MASS INDEX: 38.65 KG/M2 | OXYGEN SATURATION: 97 % | HEART RATE: 65 BPM | HEIGHT: 70 IN | SYSTOLIC BLOOD PRESSURE: 171 MMHG | DIASTOLIC BLOOD PRESSURE: 84 MMHG | RESPIRATION RATE: 18 BRPM

## 2023-12-17 DIAGNOSIS — M25.571 ACUTE RIGHT ANKLE PAIN: Primary | ICD-10-CM

## 2023-12-17 PROCEDURE — 99283 EMERGENCY DEPT VISIT LOW MDM: CPT

## 2023-12-17 PROCEDURE — 73610 X-RAY EXAM OF ANKLE: CPT

## 2023-12-17 RX ORDER — IBUPROFEN 800 MG/1
800 TABLET ORAL EVERY 6 HOURS PRN
Qty: 30 TABLET | Refills: 0 | Status: SHIPPED | OUTPATIENT
Start: 2023-12-17

## 2023-12-17 NOTE — ED PROVIDER NOTES
Subjective   History of Present Illness  53-year-old male patient presents to the emergency room today with complaints of right ankle pain.  Patient states that his dog tripped him this morning on his way to work.  This occurred about 20 minutes prior to arrival.  Patient reports worsening pain with palpation and bearing weight.  Rest alleviates pain.     History provided by:  Patient   used: No        Review of Systems   Constitutional: Negative.    HENT: Negative.     Eyes: Negative.    Respiratory: Negative.     Cardiovascular: Negative.    Gastrointestinal: Negative.    Endocrine: Negative.    Genitourinary: Negative.    Musculoskeletal:         R ankle pain     Skin: Negative.    Neurological: Negative.    Hematological: Negative.    Psychiatric/Behavioral: Negative.     All other systems reviewed and are negative.      Past Medical History:   Diagnosis Date    Back pain     Cancer     PAROTID CANCER    Degenerative joint disease     Diabetes mellitus     DVT, lower extremity     left 2013    GERD (gastroesophageal reflux disease)     Kidney stones     Neuropathy     FEET     Peroneal DVT (deep venous thrombosis)     in 1998 and dvt 2010    Pulmonary embolism     Spinal stenosis     Urolithiasis     Vertigo     OCASSIONALLY       Allergies   Allergen Reactions    Morphine Seizure       Past Surgical History:   Procedure Laterality Date    COLONOSCOPY      CYSTOSCOPY W/ LITHOLAPAXY / E[facility]      INNER EAR SURGERY      OTHER SURGICAL HISTORY      Mass removed from left side of face    VASECTOMY Bilateral 3/4/2020    Procedure: VASECTOMY;  Surgeon: Angel Adkins MD;  Location: [facility] OR;  Service: Urology;  Laterality: Bilateral;       Family History   Problem Relation Age of Onset    Other Other         deveral siblings havd had early onset DVTs    No Known Problems Mother     No Known Problems Father        Social History     Socioeconomic History    Marital status:    Tobacco  Use    Smoking status: Former     Years: 20     Types: Cigarettes     Quit date:      Years since quittin.9    Smokeless tobacco: Current     Types: Chew    Tobacco comments:     occasionally uses chew once a year    Substance and Sexual Activity    Alcohol use: No     Comment: beer occasionally    Drug use: No    Sexual activity: Defer           Objective   Physical Exam  Vitals and nursing note reviewed.   Constitutional:       Appearance: Normal appearance. He is normal weight.   HENT:      Head: Normocephalic and atraumatic.      Right Ear: External ear normal.      Left Ear: External ear normal.      Nose: Nose normal.      Mouth/Throat:      Mouth: Mucous membranes are moist.      Pharynx: Oropharynx is clear.   Eyes:      Extraocular Movements: Extraocular movements intact.      Conjunctiva/sclera: Conjunctivae normal.      Pupils: Pupils are equal, round, and reactive to light.   Cardiovascular:      Rate and Rhythm: Normal rate and regular rhythm.      Pulses: Normal pulses.      Heart sounds: Normal heart sounds.   Pulmonary:      Effort: Pulmonary effort is normal.      Breath sounds: Normal breath sounds.   Abdominal:      General: Abdomen is flat. Bowel sounds are normal.      Palpations: Abdomen is soft.   Musculoskeletal:         General: Normal range of motion.      Cervical back: Normal range of motion and neck supple.      Right ankle: Swelling present. No deformity or ecchymosis. Tenderness present. Normal range of motion. Normal pulse.      Left ankle: Normal.   Skin:     General: Skin is warm and dry.      Capillary Refill: Capillary refill takes less than 2 seconds.   Neurological:      General: No focal deficit present.      Mental Status: He is alert and oriented to person, place, and time. Mental status is at baseline.   Psychiatric:         Mood and Affect: Mood normal.         Behavior: Behavior normal.         Thought Content: Thought content normal.         Judgment: Judgment  normal.         Procedures           ED Course  ED Course as of 12/17/23 0542   Sun Dec 17, 2023   0542 XR Ankle 3+ View Right [ML]      ED Course User Index  [ML] Gia Ha PA                                      XR Ankle 3+ View Right    Result Date: 12/17/2023   1.  No acute fracture or dislocation. 2.  Very mild soft tissue swelling noted overlying the lateral malleolus. 3.  No foreign body.  This report was finalized on 12/17/2023 5:37 AM by Arnol Tee MD.            Medical Decision Making  53-year-old male patient presents to the emergency room today with complaints of right ankle pain.  Patient states that his dog tripped him this morning on his way to work.  This occurred about 20 minutes prior to arrival.  Patient reports worsening pain with palpation and bearing weight.  Rest alleviates pain.  Patient instructed to follow-up with family doctor.  Discussed symptoms and red flags that would warrant return to the emergency room.    Amount and/or Complexity of Data Reviewed  Radiology: ordered. Decision-making details documented in ED Course.        Final diagnoses:   Acute right ankle pain       ED Disposition  ED Disposition       ED Disposition   Discharge    Condition   Stable    Comment   --               Arnol Torres MD  [address]    [place] [address]  [phone]    Schedule an appointment as soon as possible for a visit in 1 day           Medication List        New Prescriptions      ibuprofen 800 MG tablet  Commonly known as: ADVIL,MOTRIN  Take 1 tablet by mouth Every 6 (Six) Hours As Needed for Mild Pain.               Where to Get Your Medications        These medications were sent to [facility] [address] [place], [place] [address] [place] [address] - [phone] [facility] [phone] [facility] [place] [id number]      Phone: [phone]   ibuprofen 800 MG tablet            Gia Ha PA  12/17/23 0542

## 2023-12-17 NOTE — Clinical Note
[facility] EMERGENCY DEPARTMENT  [address] [facility] [address]  Phone: [phone]    Alek Munoz was seen and treated in our emergency department on 12/17/2023.  He may return to work on 12/20/2023.         Thank you for choosing [facility].    Stephen Camacho MD

## 2024-02-28 ENCOUNTER — HOSPITAL ENCOUNTER (EMERGENCY)
Facility: HOSPITAL | Age: 54
Discharge: HOME | End: 2024-02-28
Attending: EMERGENCY MEDICINE | Admitting: EMERGENCY MEDICINE
Payer: COMMERCIAL

## 2024-02-28 ENCOUNTER — APPOINTMENT (OUTPATIENT)
Dept: CT IMAGING | Facility: HOSPITAL | Age: 54
End: 2024-02-28
Payer: COMMERCIAL

## 2024-02-28 VITALS
WEIGHT: 275 LBS | HEART RATE: 109 BPM | TEMPERATURE: 98.5 F | SYSTOLIC BLOOD PRESSURE: 162 MMHG | DIASTOLIC BLOOD PRESSURE: 100 MMHG | OXYGEN SATURATION: 98 % | BODY MASS INDEX: 39.37 KG/M2 | HEIGHT: 70 IN | RESPIRATION RATE: 19 BRPM

## 2024-02-28 DIAGNOSIS — N20.1 URETEROLITHIASIS: Primary | ICD-10-CM

## 2024-02-28 LAB
ALBUMIN SERPL-MCNC: 4.5 G/DL (ref 3.5–5.2)
ALBUMIN/GLOB SERPL: 1.5 G/DL
ALP SERPL-CCNC: 88 U/L (ref 39–117)
ALT SERPL W P-5'-P-CCNC: 33 U/L (ref 1–41)
ANION GAP SERPL CALCULATED.3IONS-SCNC: 13 MMOL/L (ref 5–15)
AST SERPL-CCNC: 23 U/L (ref 1–40)
BASOPHILS # BLD AUTO: 0.02 10*3/MM3 (ref 0–0.2)
BASOPHILS NFR BLD AUTO: 0.2 % (ref 0–1.5)
BILIRUB SERPL-MCNC: 0.4 MG/DL (ref 0–1.2)
BUN SERPL-MCNC: 17 MG/DL (ref 6–20)
BUN/CREAT SERPL: 15.3 (ref 7–25)
CALCIUM SPEC-SCNC: 9.2 MG/DL (ref 8.6–10.5)
CHLORIDE SERPL-SCNC: 100 MMOL/L (ref 98–107)
CO2 SERPL-SCNC: 25 MMOL/L (ref 22–29)
CREAT SERPL-MCNC: 1.11 MG/DL (ref 0.76–1.27)
CRP SERPL-MCNC: <0.3 MG/DL (ref 0–0.5)
DEPRECATED RDW RBC AUTO: 39.5 FL (ref 37–54)
EGFRCR SERPLBLD CKD-EPI 2021: 80.4 ML/MIN/1.73
EOSINOPHIL # BLD AUTO: 0.13 10*3/MM3 (ref 0–0.4)
EOSINOPHIL NFR BLD AUTO: 1.6 % (ref 0.3–6.2)
ERYTHROCYTE [DISTWIDTH] IN BLOOD BY AUTOMATED COUNT: 12.5 % (ref 12.3–15.4)
GLOBULIN UR ELPH-MCNC: 3.1 GM/DL
GLUCOSE SERPL-MCNC: 143 MG/DL (ref 65–99)
HCT VFR BLD AUTO: 54.4 % (ref 37.5–51)
HGB BLD-MCNC: 18.1 G/DL (ref 13–17.7)
HOLD SPECIMEN: NORMAL
HOLD SPECIMEN: NORMAL
IMM GRANULOCYTES # BLD AUTO: 0.03 10*3/MM3 (ref 0–0.05)
IMM GRANULOCYTES NFR BLD AUTO: 0.4 % (ref 0–0.5)
LIPASE SERPL-CCNC: 35 U/L (ref 13–60)
LYMPHOCYTES # BLD AUTO: 2.68 10*3/MM3 (ref 0.7–3.1)
LYMPHOCYTES NFR BLD AUTO: 32.9 % (ref 19.6–45.3)
MCH RBC QN AUTO: 28.8 PG (ref 26.6–33)
MCHC RBC AUTO-ENTMCNC: 33.3 G/DL (ref 31.5–35.7)
MCV RBC AUTO: 86.6 FL (ref 79–97)
MONOCYTES # BLD AUTO: 0.5 10*3/MM3 (ref 0.1–0.9)
MONOCYTES NFR BLD AUTO: 6.1 % (ref 5–12)
NEUTROPHILS NFR BLD AUTO: 4.79 10*3/MM3 (ref 1.7–7)
NEUTROPHILS NFR BLD AUTO: 58.8 % (ref 42.7–76)
NRBC BLD AUTO-RTO: 0 /100 WBC (ref 0–0.2)
PLATELET # BLD AUTO: 195 10*3/MM3 (ref 140–450)
PMV BLD AUTO: 9.5 FL (ref 6–12)
POTASSIUM SERPL-SCNC: 4.1 MMOL/L (ref 3.5–5.2)
PROT SERPL-MCNC: 7.6 G/DL (ref 6–8.5)
RBC # BLD AUTO: 6.28 10*6/MM3 (ref 4.14–5.8)
SODIUM SERPL-SCNC: 138 MMOL/L (ref 136–145)
WBC NRBC COR # BLD AUTO: 8.15 10*3/MM3 (ref 3.4–10.8)
WHOLE BLOOD HOLD COAG: NORMAL
WHOLE BLOOD HOLD SPECIMEN: NORMAL

## 2024-02-28 PROCEDURE — 36415 COLL VENOUS BLD VENIPUNCTURE: CPT

## 2024-02-28 PROCEDURE — 85025 COMPLETE CBC W/AUTO DIFF WBC: CPT | Performed by: EMERGENCY MEDICINE

## 2024-02-28 PROCEDURE — 96375 TX/PRO/DX INJ NEW DRUG ADDON: CPT

## 2024-02-28 PROCEDURE — 96374 THER/PROPH/DIAG INJ IV PUSH: CPT

## 2024-02-28 PROCEDURE — 99284 EMERGENCY DEPT VISIT MOD MDM: CPT

## 2024-02-28 PROCEDURE — 99221 1ST HOSP IP/OBS SF/LOW 40: CPT

## 2024-02-28 PROCEDURE — 25010000002 KETOROLAC TROMETHAMINE PER 15 MG: Performed by: EMERGENCY MEDICINE

## 2024-02-28 PROCEDURE — 74176 CT ABD & PELVIS W/O CONTRAST: CPT

## 2024-02-28 PROCEDURE — 86140 C-REACTIVE PROTEIN: CPT | Performed by: PHYSICIAN ASSISTANT

## 2024-02-28 PROCEDURE — 25010000002 ONDANSETRON PER 1 MG: Performed by: EMERGENCY MEDICINE

## 2024-02-28 PROCEDURE — 74176 CT ABD & PELVIS W/O CONTRAST: CPT | Performed by: RADIOLOGY

## 2024-02-28 PROCEDURE — 83690 ASSAY OF LIPASE: CPT | Performed by: EMERGENCY MEDICINE

## 2024-02-28 PROCEDURE — 25010000002 HYDROMORPHONE 1 MG/ML SOLUTION: Performed by: EMERGENCY MEDICINE

## 2024-02-28 PROCEDURE — 80053 COMPREHEN METABOLIC PANEL: CPT | Performed by: EMERGENCY MEDICINE

## 2024-02-28 RX ORDER — ONDANSETRON 4 MG/1
4 TABLET, FILM COATED ORAL EVERY 6 HOURS PRN
Qty: 15 TABLET | Refills: 0 | Status: SHIPPED | OUTPATIENT
Start: 2024-02-28

## 2024-02-28 RX ORDER — SODIUM CHLORIDE 0.9 % (FLUSH) 0.9 %
10 SYRINGE (ML) INJECTION AS NEEDED
Status: DISCONTINUED | OUTPATIENT
Start: 2024-02-28 | End: 2024-02-28 | Stop reason: HOSPADM

## 2024-02-28 RX ORDER — ONDANSETRON 2 MG/ML
4 INJECTION INTRAMUSCULAR; INTRAVENOUS ONCE
Status: COMPLETED | OUTPATIENT
Start: 2024-02-28 | End: 2024-02-28

## 2024-02-28 RX ORDER — KETOROLAC TROMETHAMINE 30 MG/ML
30 INJECTION, SOLUTION INTRAMUSCULAR; INTRAVENOUS ONCE
Status: COMPLETED | OUTPATIENT
Start: 2024-02-28 | End: 2024-02-28

## 2024-02-28 RX ORDER — KETOROLAC TROMETHAMINE 10 MG/1
10 TABLET, FILM COATED ORAL EVERY 6 HOURS PRN
Qty: 15 TABLET | Refills: 0 | Status: SHIPPED | OUTPATIENT
Start: 2024-02-28 | End: 2024-03-01

## 2024-02-28 RX ADMIN — HYDROMORPHONE HYDROCHLORIDE 1 MG: 1 INJECTION, SOLUTION INTRAMUSCULAR; INTRAVENOUS; SUBCUTANEOUS at 07:05

## 2024-02-28 RX ADMIN — KETOROLAC TROMETHAMINE 30 MG: 30 INJECTION, SOLUTION INTRAMUSCULAR at 07:06

## 2024-02-28 RX ADMIN — ONDANSETRON 4 MG: 2 INJECTION INTRAMUSCULAR; INTRAVENOUS at 07:06

## 2024-02-28 NOTE — CONSULTS
Name:  Alek Munoz  :  1970    DATE OF ADMISSION  2024    DATE OF CONSULT  2024     REFERRING PHYSICIAN  * No referring provider recorded for this case *    PRIMARY CARE PHYSICIAN  Arnol Torres MD    REASON FOR CONSULT  Nephrolithiasis    CHIEF COMPLAINT  Chief Complaint   Patient presents with    Flank Pain       HISTORY OF PRESENT ILLNESS:   Alek Munoz is a 53 y.o. male who presented to the emergency department early this morning secondary to left-sided back and flank pain.  He has a past medical history significant for nephrolithiasis and low testosterone.  He has been seen in office by Dr. Adkins recently in 2023.  Initial CMP at time of ER evaluation showed a creatinine of 1.11 and a GFR of 80.4.  BUN was normal at 17.  Patient's white blood cell count was normal at roughly 8000, hemoglobin and hematocrit were elevated slightly at 18.1 and 54 point.  Lipase and C-reactive protein were within normal limits.  He had a CT scan of the abdomen pelvis completed that showed a left proximal UPJ calculus measuring roughly 5 mm in size.  This was causing partial left renal obstruction.  There were punctate nonobstructing kidney stones bilaterally.  No other abnormalities were noted of the bladder or ureters.    I saw Alek Munoz in their hospital room this morning.  Patient was in no acute distress.  Patient states that he has had intermittent left-sided back and flank pain over the past few days.  He also reports some right-sided back pain but this is very minimal.  Patient does endorse a history of passing several kidney stones previously with his largest being roughly 7 to 8 mm in size.  Denies any fever, chills, gross hematuria, difficulty urinating, decreased urinary output, or inability urinate.  States he does feel better after receiving pain medication.  Nausea and vomiting has improved as well.    PAST MEDICAL HISTORY  Past Medical History:   Diagnosis Date    Back pain      Cancer     PAROTID CANCER    Degenerative joint disease     Diabetes mellitus     DVT, lower extremity     left 2013    GERD (gastroesophageal reflux disease)     Kidney stones     Neuropathy     FEET     Peroneal DVT (deep venous thrombosis)     in 1998 and dvt     Pulmonary embolism     Spinal stenosis     Urolithiasis     Vertigo     OCASSIONALLY       PAST SURGICAL HISTORY  Past Surgical History:   Procedure Laterality Date    COLONOSCOPY      CYSTOSCOPY W/ LITHOLAPAXY / [facility]      INNER EAR SURGERY      OTHER SURGICAL HISTORY      Mass removed from left side of face    VASECTOMY Bilateral 3/4/2020    Procedure: VASECTOMY;  Surgeon: Angel Adkins MD;  Location: [facility];  Service: Urology;  Laterality: Bilateral;       SOCIAL HISTORY  Social History     Socioeconomic History    Marital status:    Tobacco Use    Smoking status: Former     Years: 20     Types: Cigarettes     Quit date:      Years since quittin.1    Smokeless tobacco: Current     Types: Chew    Tobacco comments:     occasionally uses chew once a year    Substance and Sexual Activity    Alcohol use: No     Comment: beer occasionally    Drug use: No    Sexual activity: Defer       FAMILY HISTORY  Family History   Problem Relation Age of Onset    Other Other         deveral siblings havd had early onset DVTs    No Known Problems Mother     No Known Problems Father        ALLERGIES  Allergies   Allergen Reactions    Morphine Seizure       INPATIENT MEDICATIONS  Current Facility-Administered Medications   Medication Dose Route Frequency Provider Last Rate Last Admin    sodium chloride 0.9 % flush 10 mL  10 mL Intravenous PRN Mary Saucedo MD         Current Outpatient Medications   Medication Sig Dispense Refill    bisoprolol-hydrochlorothiazide (ZIAC) 5-6.25 MG per tablet Take 1 tablet by mouth Daily. as directed      butalbital-acetaminophen-caffeine (FIORICET, ESGIC) -40 MG per tablet       cyclobenzaprine  "(FLEXERIL) 10 MG tablet Take 1 tablet by mouth 3 (Three) Times a Day As Needed for Muscle Spasms.      gabapentin (NEURONTIN) 800 MG tablet 4 (Four) Times a Day.      ibuprofen (ADVIL,MOTRIN) 800 MG tablet Take 1 tablet by mouth Every 6 (Six) Hours As Needed for Mild Pain. 30 tablet 0    Insulin Glargine (BASAGLAR KWIKPEN SC) Inject 55 Units under the skin into the appropriate area as directed Every Evening.      meclizine (ANTIVERT) 25 MG tablet Take 1 tablet by mouth 3 (Three) Times a Day As Needed for Dizziness. 20 tablet 0    mometasone (Nasonex) 50 MCG/ACT nasal spray 2 sprays into the nostril(s) as directed by provider Daily. 17 g 0    omeprazole (PriLOSEC) 20 MG capsule       oxyCODONE-acetaminophen (PERCOCET)  MG per tablet Take 1 tablet by mouth Every 6 (Six) Hours As Needed.      oxyCODONE-acetaminophen (PERCOCET)  MG per tablet Take 1 tablet by mouth Every 4 (Four) Hours As Needed for Moderate Pain  (Pain). 12 tablet 0    OxyCONTIN 10 MG 12 hr tablet TAKE ONE TABLET BY MOUTH EVERY NIGHT AT BEDTIME AS NEEDED FOR PAIN *DNF 04/18/23*      Ozempic, 1 MG/DOSE, 4 MG/3ML solution pen-injector INJECT 1MG UNDER THE SKIN EVERY WEEK AS DIRECTED      phentermine 37.5 MG capsule Take 1 capsule by mouth Every Morning.      Syringe 25G X 5/8\" 3 ML misc Use as directed 2 x weekly 24 each 3    tamsulosin (FLOMAX) 0.4 MG capsule 24 hr capsule TAKE 1 CAPSULE BY MOUTH 30 MINUTES FOLLOWING THE SAME MEAL EACH DAY      tamsulosin (Flomax) 0.4 MG capsule 24 hr capsule Take 1 capsule by mouth Every Night. 30 capsule 5    Testosterone Cypionate (Depo-Testosterone) 200 MG/ML injection Inject 1/2 cc subcutaneously every Monday and Thursday 10 mL 2    tiZANidine (ZANAFLEX) 4 MG tablet Take 1 tablet by mouth At Night As Needed for Muscle Spasms.      tiZANidine (ZANAFLEX) 4 MG tablet Take 1 tablet by mouth At Night As Needed for Muscle Spasms. 30 tablet 0       PHYSICAL EXAMINATION    /100 (BP Location: Right arm, " "Patient Position: Sitting)   Pulse 109   Temp 98.5 °F (36.9 °C) (Oral)   Resp 19   Ht 177.8 cm (70\")   Wt 125 kg (275 lb)   SpO2 98%   BMI 39.46 kg/m²     GENERAL:  A well-developed, well-nourished, white male in no acute distress.  HEENT:  Pupils equally round and reactive to light.  Extraocular muscles intact.  CARDIOVASCULAR:  Regular rate and rhythm.  No murmurs, gallops or rubs.  LUNGS:  Clear to auscultation bilaterally.  ABDOMEN:  Soft, nontender, nondistended with positive bowel sounds.  No significant right-sided or left-sided CVA tenderness.  EXTREMITIES:  No clubbing, cyanosis or edema bilaterally.  SKIN:  No rashes or petechiae.  NEURO:  Cranial nerves grossly intact.  No focal deficits.  PSYCH:  Alert and oriented x3.    LABORATORY     WBC   Date Value Ref Range Status   02/28/2024 8.15 3.40 - 10.80 10*3/mm3 Final     RBC   Date Value Ref Range Status   02/28/2024 6.28 (H) 4.14 - 5.80 10*6/mm3 Final     Hemoglobin   Date Value Ref Range Status   02/28/2024 18.1 (H) 13.0 - 17.7 g/dL Final     Hematocrit   Date Value Ref Range Status   02/28/2024 54.4 (H) 37.5 - 51.0 % Final     MCV   Date Value Ref Range Status   02/28/2024 86.6 79.0 - 97.0 fL Final     MCH   Date Value Ref Range Status   02/28/2024 28.8 26.6 - 33.0 pg Final     MCHC   Date Value Ref Range Status   02/28/2024 33.3 31.5 - 35.7 g/dL Final     RDW   Date Value Ref Range Status   02/28/2024 12.5 12.3 - 15.4 % Final     RDW-SD   Date Value Ref Range Status   02/28/2024 39.5 37.0 - 54.0 fl Final     MPV   Date Value Ref Range Status   02/28/2024 9.5 6.0 - 12.0 fL Final     Platelets   Date Value Ref Range Status   02/28/2024 195 140 - 450 10*3/mm3 Final     Neutrophil %   Date Value Ref Range Status   02/28/2024 58.8 42.7 - 76.0 % Final     Lymphocyte %   Date Value Ref Range Status   02/28/2024 32.9 19.6 - 45.3 % Final     Monocyte %   Date Value Ref Range Status   02/28/2024 6.1 5.0 - 12.0 % Final     Eosinophil %   Date Value Ref " Range Status   02/28/2024 1.6 0.3 - 6.2 % Final     Basophil %   Date Value Ref Range Status   02/28/2024 0.2 0.0 - 1.5 % Final     Immature Grans %   Date Value Ref Range Status   02/28/2024 0.4 0.0 - 0.5 % Final     Neutrophils, Absolute   Date Value Ref Range Status   02/28/2024 4.79 1.70 - 7.00 10*3/mm3 Final     Lymphocytes, Absolute   Date Value Ref Range Status   02/28/2024 2.68 0.70 - 3.10 10*3/mm3 Final     Monocytes, Absolute   Date Value Ref Range Status   02/28/2024 0.50 0.10 - 0.90 10*3/mm3 Final     Eosinophils, Absolute   Date Value Ref Range Status   02/28/2024 0.13 0.00 - 0.40 10*3/mm3 Final     Basophils, Absolute   Date Value Ref Range Status   02/28/2024 0.02 0.00 - 0.20 10*3/mm3 Final     Immature Grans, Absolute   Date Value Ref Range Status   02/28/2024 0.03 0.00 - 0.05 10*3/mm3 Final     nRBC   Date Value Ref Range Status   02/28/2024 0.0 0.0 - 0.2 /100 WBC Final       Glucose   Date Value Ref Range Status   02/28/2024 143 (H) 65 - 99 mg/dL Final     Comment:     2+ Lipemia       Sodium   Date Value Ref Range Status   02/28/2024 138 136 - 145 mmol/L Final     Potassium   Date Value Ref Range Status   02/28/2024 4.1 3.5 - 5.2 mmol/L Final     Comment:     Slight hemolysis detected by analyzer. Result may be falsely elevated.     CO2   Date Value Ref Range Status   02/28/2024 25.0 22.0 - 29.0 mmol/L Final     Chloride   Date Value Ref Range Status   02/28/2024 100 98 - 107 mmol/L Final     Anion Gap   Date Value Ref Range Status   02/28/2024 13.0 5.0 - 15.0 mmol/L Final     Creatinine   Date Value Ref Range Status   02/28/2024 1.11 0.76 - 1.27 mg/dL Final     BUN   Date Value Ref Range Status   02/28/2024 17 6 - 20 mg/dL Final     BUN/Creatinine Ratio   Date Value Ref Range Status   02/28/2024 15.3 7.0 - 25.0 Final     Calcium   Date Value Ref Range Status   02/28/2024 9.2 8.6 - 10.5 mg/dL Final     Alkaline Phosphatase   Date Value Ref Range Status   02/28/2024 88 39 - 117 U/L Final     Total  "Protein   Date Value Ref Range Status   02/28/2024 7.6 6.0 - 8.5 g/dL Final     ALT (SGPT)   Date Value Ref Range Status   02/28/2024 33 1 - 41 U/L Final     AST (SGOT)   Date Value Ref Range Status   02/28/2024 23 1 - 40 U/L Final     Total Bilirubin   Date Value Ref Range Status   02/28/2024 0.4 0.0 - 1.2 mg/dL Final     Albumin   Date Value Ref Range Status   02/28/2024 4.5 3.5 - 5.2 g/dL Final     Globulin   Date Value Ref Range Status   02/28/2024 3.1 gm/dL Final       No results found for: \"MG\", \"PHOS\"  No results found for: \"LDH\", \"URICACID\"     IMAGING  Imaging Results (Last 72 Hours)       Procedure Component Value Units Date/Time    CT Abdomen Pelvis Stone Protocol [[id number]] Collected: 02/28/24 0737     Updated: 02/28/24 0816    Narrative:      PROCEDURE: CT of the abdomen and pelvis performed without IV contrast on  February 28, 2024. The examination was performed with 4 mm axial imaging  and 4 mm sagittal and coronal reconstruction images. The examination was  performed according to as low as reasonably achievable dose protocol.     HISTORY: Left flank pain.     COMPARISON: None.     FINDINGS:     Left lower lobe calcified granuloma.  Small bands of scar versus atelectasis in the left lung base and lower  lingula.  Old fractures involving the lower lateral left ribs.  No pleural effusion or pneumothorax.  Normal heart size with no pericardial effusion.  No acute process seen in the liver, spleen, gallbladder, pancreas,  adrenal glands, and abdominal aorta.  There is a stone in the proximal left ureter seen best on image 74,  series 2 and this measures 5 mm. Partial left renal obstruction.  Right nephrolithiasis.  Normal appendix is well seen.  Very small umbilical hernia contains only fat.  Mild enlarged prostate gland.  Small right and left inguinal hernias contain only fat.  Mild osteoarthritis at the right and left hip joint.  No abdominal aortic aneurysm or retroperitoneal hemorrhage.  No free " fluid or free air.  No abscess.  Mild constipation involving the colon.  No features to suggest cystitis.       Impression:         1.  5 mm stone in the proximal left ureter with associated partial left  renal obstruction.  2.  Nonobstructive right nephrolithiasis.  3.  Normal appendix.  4.  Mild constipation throughout the colon.  5.  Mild enlarged prostate gland.  6.  No free fluid or free air  7.  No abscess or hematoma.  8.  Mild degenerative disc disease in the lumbar spine with endplate and  facet hypertrophic changes.     This report was finalized on 2/28/2024 7:41 AM by Arnol Tee MD.               CT Abdomen and Pelvis: No results found for this or any previous visit.       CT Stone Protocol: Results for orders placed during the hospital encounter of 02/28/24    CT Abdomen Pelvis Stone Protocol    Narrative  PROCEDURE: CT of the abdomen and pelvis performed without IV contrast on  February 28, 2024. The examination was performed with 4 mm axial imaging  and 4 mm sagittal and coronal reconstruction images. The examination was  performed according to as low as reasonably achievable dose protocol.    HISTORY: Left flank pain.    COMPARISON: None.    FINDINGS:    Left lower lobe calcified granuloma.  Small bands of scar versus atelectasis in the left lung base and lower  lingula.  Old fractures involving the lower lateral left ribs.  No pleural effusion or pneumothorax.  Normal heart size with no pericardial effusion.  No acute process seen in the liver, spleen, gallbladder, pancreas,  adrenal glands, and abdominal aorta.  There is a stone in the proximal left ureter seen best on image 74,  series 2 and this measures 5 mm. Partial left renal obstruction.  Right nephrolithiasis.  Normal appendix is well seen.  Very small umbilical hernia contains only fat.  Mild enlarged prostate gland.  Small right and left inguinal hernias contain only fat.  Mild osteoarthritis at the right and left hip joint.  No  abdominal aortic aneurysm or retroperitoneal hemorrhage.  No free fluid or free air.  No abscess.  Mild constipation involving the colon.  No features to suggest cystitis.    Impression  1.  5 mm stone in the proximal left ureter with associated partial left  renal obstruction.  2.  Nonobstructive right nephrolithiasis.  3.  Normal appendix.  4.  Mild constipation throughout the colon.  5.  Mild enlarged prostate gland.  6.  No free fluid or free air  7.  No abscess or hematoma.  8.  Mild degenerative disc disease in the lumbar spine with endplate and  facet hypertrophic changes.    This report was finalized on 2/28/2024 7:41 AM by Arnol Tee MD.       KUB: No results found for this or any previous visit.   \    LABS:   Admission on 02/28/2024   Component Date Value Ref Range Status    Glucose 02/28/2024 143 (H)  65 - 99 mg/dL Final    2+ Lipemia      BUN 02/28/2024 17  6 - 20 mg/dL Final    Creatinine 02/28/2024 1.11  0.76 - 1.27 mg/dL Final    Sodium 02/28/2024 138  136 - 145 mmol/L Final    Potassium 02/28/2024 4.1  3.5 - 5.2 mmol/L Final    Slight hemolysis detected by analyzer. Result may be falsely elevated.    Chloride 02/28/2024 100  98 - 107 mmol/L Final    CO2 02/28/2024 25.0  22.0 - 29.0 mmol/L Final    Calcium 02/28/2024 9.2  8.6 - 10.5 mg/dL Final    Total Protein 02/28/2024 7.6  6.0 - 8.5 g/dL Final    Albumin 02/28/2024 4.5  3.5 - 5.2 g/dL Final    ALT (SGPT) 02/28/2024 33  1 - 41 U/L Final    AST (SGOT) 02/28/2024 23  1 - 40 U/L Final    Alkaline Phosphatase 02/28/2024 88  39 - 117 U/L Final    Total Bilirubin 02/28/2024 0.4  0.0 - 1.2 mg/dL Final    Globulin 02/28/2024 3.1  gm/dL Final    A/G Ratio 02/28/2024 1.5  g/dL Final    BUN/Creatinine Ratio 02/28/2024 15.3  7.0 - 25.0 Final    Anion Gap 02/28/2024 13.0  5.0 - 15.0 mmol/L Final    eGFR 02/28/2024 80.4  >60.0 mL/min/1.73 Final    Lipase 02/28/2024 35  13 - 60 U/L Final    Extra Tube 02/28/2024 Hold for add-ons.   Final    Auto resulted.     Extra Tube 02/28/2024 hold for add-on   Final    Auto resulted    Extra Tube 02/28/2024 Hold for add-ons.   Final    Auto resulted.    Extra Tube 02/28/2024 Hold for add-ons.   Final    Auto resulted    WBC 02/28/2024 8.15  3.40 - 10.80 10*3/mm3 Final    RBC 02/28/2024 6.28 (H)  4.14 - 5.80 10*6/mm3 Final    Hemoglobin 02/28/2024 18.1 (H)  13.0 - 17.7 g/dL Final    Hematocrit 02/28/2024 54.4 (H)  37.5 - 51.0 % Final    MCV 02/28/2024 86.6  79.0 - 97.0 fL Final    MCH 02/28/2024 28.8  26.6 - 33.0 pg Final    MCHC 02/28/2024 33.3  31.5 - 35.7 g/dL Final    RDW 02/28/2024 12.5  12.3 - 15.4 % Final    RDW-SD 02/28/2024 39.5  37.0 - 54.0 fl Final    MPV 02/28/2024 9.5  6.0 - 12.0 fL Final    Platelets 02/28/2024 195  140 - 450 10*3/mm3 Final    Neutrophil % 02/28/2024 58.8  42.7 - 76.0 % Final    Lymphocyte % 02/28/2024 32.9  19.6 - 45.3 % Final    Monocyte % 02/28/2024 6.1  5.0 - 12.0 % Final    Eosinophil % 02/28/2024 1.6  0.3 - 6.2 % Final    Basophil % 02/28/2024 0.2  0.0 - 1.5 % Final    Immature Grans % 02/28/2024 0.4  0.0 - 0.5 % Final    Neutrophils, Absolute 02/28/2024 4.79  1.70 - 7.00 10*3/mm3 Final    Lymphocytes, Absolute 02/28/2024 2.68  0.70 - 3.10 10*3/mm3 Final    Monocytes, Absolute 02/28/2024 0.50  0.10 - 0.90 10*3/mm3 Final    Eosinophils, Absolute 02/28/2024 0.13  0.00 - 0.40 10*3/mm3 Final    Basophils, Absolute 02/28/2024 0.02  0.00 - 0.20 10*3/mm3 Final    Immature Grans, Absolute 02/28/2024 0.03  0.00 - 0.05 10*3/mm3 Final    nRBC 02/28/2024 0.0  0.0 - 0.2 /100 WBC Final    C-Reactive Protein 02/28/2024 <0.30  0.00 - 0.50 mg/dL Final        PATHOLOGY  * Cannot find OR log *    IMPRESSION AND PLAN  Alek Munoz is a 53 y.o., white male with:  Left ureteral calculus- It was discussed with the patient the presence of a punctate nonobstructing bilateral kidney stones as well as a 5 mm left proximal ureteral stone. We discussed the various therapeutic options available including percutaneous  nephrostolithotomy, ureteroscopy and extracorporeal shockwave  lithotripsy.  We discussed the risks of lithotripsy including the passage of stones leading to a 3% chance of Steinstrasse or a large string of stones in the distal ureter. In this incidence the patient was informed that a ureteroscopy is indicated for obstructing fragments.  Patient was informed of an extremely rare incidence of renal hematoma and the significance of this.  Patient was educated on percutaneous nephrostolithotomy and its use as well as the risks and benefits such as the need for postoperative hospitalization, and the risk of damage to the kidney and the remote risk of a nephrectomy.  We also discussed the use of ureteroscopy in the upper tracts and its decreased success rate to completely remove the stones likely causing stent placement leading to an additional procedure for removal.  We discussed the absolute relative indicators for intervention including the presence of sepsis and uncontrollable pain leading to need for urgent intervention.  We discussed placement of a stent if indicated and the management of the stent as well.  Given patient's current symptoms and location of stone we recommend observation with trial to pass the stone voluntarily.  Recommend for the patient to continue Flomax once to twice daily.  Discussed the risk and benefits of this in detail with the patient.  Patient may be discharged home with appropriate pain medication.  Patient will follow-up closely with us in office on March 1, 2023.  Vies him return to our office sooner or go to the nearest ER if symptoms worsen or begins to experience fever, chills, nausea, vomiting, gross hematuria, or altered mental status.  Patient is unable to pass the stone voluntarily we will schedule him for a outpatient left ureteroscopy holmium laser lithotripsy.  Patient verbalized understanding.    The patient was in agreement with these plans.    Thank you for asking us to  participate in Alek Munoz's care.  We will continue to follow with you.  Please do not hesitate to call with any questions or concerns that you may have.    A total of 60 minutes were spent coordinating this patient’s care in clinic today; 30 minutes of which were face-to-face with the patient, reviewing medical history and counseling on the current treatment and followup plan.  All questions were answered to patient's satisfaction.         This document has been electronically signed by Juaquin Orozco PA-C  February 28, 2024 08:19 EST    Part of this note may be an electronic transcription/translation of spoken language to printed text using the Dragon Dictation System.

## 2024-02-28 NOTE — ED PROVIDER NOTES
Subjective     History provided by:  Patient  Flank Pain  Pain location:  L flank  Pain quality: gnawing, sharp and stabbing    Pain radiates to:  Suprapubic region  Pain severity:  Severe  Onset quality:  Sudden  Duration:  2 hours  Timing:  Constant  Progression:  Worsening  Chronicity:  Recurrent  Relieved by:  Nothing  Associated symptoms: nausea and vomiting    Associated symptoms: no chest pain, no dysuria and no fever        Review of Systems   Constitutional: Negative.  Negative for fever.   HENT: Negative.     Respiratory: Negative.     Cardiovascular: Negative.  Negative for chest pain.   Gastrointestinal:  Positive for nausea and vomiting. Negative for abdominal pain.   Endocrine: Negative.    Genitourinary:  Positive for flank pain. Negative for dysuria.   Skin: Negative.    Neurological: Negative.    Psychiatric/Behavioral: Negative.     All other systems reviewed and are negative.      Past Medical History:   Diagnosis Date    Back pain     Cancer     PAROTID CANCER    Degenerative joint disease     Diabetes mellitus     DVT, lower extremity     left 2013    GERD (gastroesophageal reflux disease)     Kidney stones     Neuropathy     FEET     Peroneal DVT (deep venous thrombosis)     in 1998 and dvt 2010    Pulmonary embolism     Spinal stenosis     Urolithiasis     Vertigo     OCASSIONALLY       Allergies   Allergen Reactions    Morphine Seizure       Past Surgical History:   Procedure Laterality Date    COLONOSCOPY      CYSTOSCOPY W/ LITHOLAPAXY / EHL      INNER EAR SURGERY      OTHER SURGICAL HISTORY      Mass removed from left side of face    VASECTOMY Bilateral 3/4/2020    Procedure: VASECTOMY;  Surgeon: Angel Adkins MD;  Location: [facility];  Service: Urology;  Laterality: Bilateral;       Family History   Problem Relation Age of Onset    Other Other         deveral siblings havd had early onset DVTs    No Known Problems Mother     No Known Problems Father        Social History      Socioeconomic History    Marital status:    Tobacco Use    Smoking status: Former     Years: 20     Types: Cigarettes     Quit date:      Years since quittin.1    Smokeless tobacco: Current     Types: Chew    Tobacco comments:     occasionally uses chew once a year    Substance and Sexual Activity    Alcohol use: No     Comment: beer occasionally    Drug use: No    Sexual activity: Defer           Objective   Physical Exam  Vitals and nursing note reviewed.   Constitutional:       General: He is not in acute distress.     Appearance: He is well-developed. He is not diaphoretic.   HENT:      Head: Normocephalic and atraumatic.      Right Ear: External ear normal.      Left Ear: External ear normal.      Nose: Nose normal.   Eyes:      Conjunctiva/sclera: Conjunctivae normal.   Neck:      Vascular: No JVD.      Trachea: No tracheal deviation.   Cardiovascular:      Rate and Rhythm: Normal rate.      Heart sounds: No murmur heard.  Pulmonary:      Effort: Pulmonary effort is normal. No respiratory distress.      Breath sounds: No wheezing.   Abdominal:      Palpations: Abdomen is soft.      Tenderness: There is no abdominal tenderness. There is left CVA tenderness.   Musculoskeletal:         General: No deformity. Normal range of motion.      Cervical back: Normal range of motion and neck supple.   Skin:     General: Skin is warm and dry.      Coloration: Skin is not pale.      Findings: No erythema or rash.   Neurological:      Mental Status: He is alert and oriented to person, place, and time.      Cranial Nerves: No cranial nerve deficit.   Psychiatric:         Behavior: Behavior normal.         Thought Content: Thought content normal.         Procedures           ED Course  ED Course as of 24 1609      0753 CT abd pelvis rad interpreted:  1.  5 mm stone in the proximal left ureter with associated partial left  renal obstruction.  2.  Nonobstructive right  nephrolithiasis.  3.  Normal appendix.  4.  Mild constipation throughout the colon.  5.  Mild enlarged prostate gland.  6.  No free fluid or free air  7.  No abscess or hematoma.  8.  Mild degenerative disc disease in the lumbar spine with endplate and  facet hypertrophic changes.   [RB]   0806 Consults urology discussed with SHERRY Longo.  Agreed to consult in the ED.  Urology is in the room at this time. [RB]      ED Course User Index  [RB] Alexys Madrid II, PA                                   Results for orders placed or performed during the hospital encounter of 02/28/24   Comprehensive Metabolic Panel    Specimen: Blood   Result Value Ref Range    Glucose 143 (H) 65 - 99 mg/dL    BUN 17 6 - 20 mg/dL    Creatinine 1.11 0.76 - 1.27 mg/dL    Sodium 138 136 - 145 mmol/L    Potassium 4.1 3.5 - 5.2 mmol/L    Chloride 100 98 - 107 mmol/L    CO2 25.0 22.0 - 29.0 mmol/L    Calcium 9.2 8.6 - 10.5 mg/dL    Total Protein 7.6 6.0 - 8.5 g/dL    Albumin 4.5 3.5 - 5.2 g/dL    ALT (SGPT) 33 1 - 41 U/L    AST (SGOT) 23 1 - 40 U/L    Alkaline Phosphatase 88 39 - 117 U/L    Total Bilirubin 0.4 0.0 - 1.2 mg/dL    Globulin 3.1 gm/dL    A/G Ratio 1.5 g/dL    BUN/Creatinine Ratio 15.3 7.0 - 25.0    Anion Gap 13.0 5.0 - 15.0 mmol/L    eGFR 80.4 >60.0 mL/min/1.73   Lipase    Specimen: Blood   Result Value Ref Range    Lipase 35 13 - 60 U/L   CBC Auto Differential    Specimen: Blood   Result Value Ref Range    WBC 8.15 3.40 - 10.80 10*3/mm3    RBC 6.28 (H) 4.14 - 5.80 10*6/mm3    Hemoglobin 18.1 (H) 13.0 - 17.7 g/dL    Hematocrit 54.4 (H) 37.5 - 51.0 %    MCV 86.6 79.0 - 97.0 fL    MCH 28.8 26.6 - 33.0 pg    MCHC 33.3 31.5 - 35.7 g/dL    RDW 12.5 12.3 - 15.4 %    RDW-SD 39.5 37.0 - 54.0 fl    MPV 9.5 6.0 - 12.0 fL    Platelets 195 140 - 450 10*3/mm3    Neutrophil % 58.8 42.7 - 76.0 %    Lymphocyte % 32.9 19.6 - 45.3 %    Monocyte % 6.1 5.0 - 12.0 %    Eosinophil % 1.6 0.3 - 6.2 %    Basophil % 0.2 0.0 - 1.5 %    Immature Grans %  0.4 0.0 - 0.5 %    Neutrophils, Absolute 4.79 1.70 - 7.00 10*3/mm3    Lymphocytes, Absolute 2.68 0.70 - 3.10 10*3/mm3    Monocytes, Absolute 0.50 0.10 - 0.90 10*3/mm3    Eosinophils, Absolute 0.13 0.00 - 0.40 10*3/mm3    Basophils, Absolute 0.02 0.00 - 0.20 10*3/mm3    Immature Grans, Absolute 0.03 0.00 - 0.05 10*3/mm3    nRBC 0.0 0.0 - 0.2 /100 WBC   C-reactive Protein    Specimen: Blood   Result Value Ref Range    C-Reactive Protein <0.30 0.00 - 0.50 mg/dL   Green Top (Gel)   Result Value Ref Range    Extra Tube Hold for add-ons.    Lavender Top   Result Value Ref Range    Extra Tube hold for add-on    Gold Top - SST   Result Value Ref Range    Extra Tube Hold for add-ons.    Light Blue Top   Result Value Ref Range    Extra Tube Hold for add-ons.      CT Abdomen Pelvis Stone Protocol    Result Date: 2/28/2024   1.  5 mm stone in the proximal left ureter with associated partial left renal obstruction. 2.  Nonobstructive right nephrolithiasis. 3.  Normal appendix. 4.  Mild constipation throughout the colon. 5.  Mild enlarged prostate gland. 6.  No free fluid or free air 7.  No abscess or hematoma. 8.  Mild degenerative disc disease in the lumbar spine with endplate and facet hypertrophic changes.  This report was finalized on 2/28/2024 7:41 AM by Arnol Tee MD.               Medical Decision Making    History provided by:  Patient  Flank Pain  Pain location:  L flank  Pain quality: gnawing, sharp and stabbing    Pain radiates to:  Suprapubic region  Pain severity:  Severe  Onset quality:  Sudden  Duration:  2 hours  Timing:  Constant  Progression:  Worsening  Chronicity:  Recurrent  Relieved by:  Nothing  Associated symptoms: nausea and vomiting    Associated symptoms: no chest pain, no dysuria and no fever        Problems Addressed:  Ureterolithiasis: complicated acute illness or injury    Amount and/or Complexity of Data Reviewed  Labs: ordered. Decision-making details documented in ED Course.  Radiology:  ordered. Decision-making details documented in ED Course.  Discussion of management or test interpretation with external provider(s): Juaquin Orozco will be seeing patient in the office on Friday.    Risk  Prescription drug management.  Risk Details: Patient was counseled about the signs and symptoms worsening with appropriate follow-up he was counseled as diagnostic of labs.  He requested not to stay around to provide a urine.  He states that he had urinated just prior to arrival.  No burning with urination urinary frequency or urgency.  No penile discharge.        Final diagnoses:   Ureterolithiasis       ED Disposition  ED Disposition       ED Disposition   Discharge    Condition   Stable    Comment   --               Juaquin Orozco PA-C  [address] [facility]  [address]  [place] [id number] [phone]      As scheduled         Medication List        New Prescriptions      ketorolac 10 MG tablet  Commonly known as: TORADOL  Take 1 tablet by mouth Every 6 (Six) Hours As Needed for Moderate Pain.     ondansetron 4 MG tablet  Commonly known as: ZOFRAN  Take 1 tablet by mouth Every 6 (Six) Hours As Needed for Nausea or Vomiting.            Stop      ibuprofen 800 MG tablet  Commonly known as: ADVILMOTRIN               Where to Get Your Medications        You can get these medications from any pharmacy    Bring a paper prescription for each of these medications  ketorolac 10 MG tablet  ondansetron 4 MG tablet            Aubrey Horn PA  02/28/24 2211

## 2024-02-29 ENCOUNTER — HOSPITAL ENCOUNTER (EMERGENCY)
Facility: HOSPITAL | Age: 54
Discharge: HOME | End: 2024-02-29
Attending: STUDENT IN AN ORGANIZED HEALTH CARE EDUCATION/TRAINING PROGRAM
Payer: COMMERCIAL

## 2024-02-29 VITALS
TEMPERATURE: 98 F | DIASTOLIC BLOOD PRESSURE: 71 MMHG | RESPIRATION RATE: 18 BRPM | SYSTOLIC BLOOD PRESSURE: 115 MMHG | HEIGHT: 70 IN | BODY MASS INDEX: 37.37 KG/M2 | HEART RATE: 79 BPM | WEIGHT: 261 LBS | OXYGEN SATURATION: 95 %

## 2024-02-29 DIAGNOSIS — N20.1 URETEROLITHIASIS: Primary | ICD-10-CM

## 2024-02-29 LAB
ALBUMIN SERPL-MCNC: 4.2 G/DL (ref 3.5–5.2)
ALBUMIN/GLOB SERPL: 1.6 G/DL
ALP SERPL-CCNC: 72 U/L (ref 39–117)
ALT SERPL W P-5'-P-CCNC: 33 U/L (ref 1–41)
ANION GAP SERPL CALCULATED.3IONS-SCNC: 10.4 MMOL/L (ref 5–15)
AST SERPL-CCNC: 22 U/L (ref 1–40)
BASOPHILS # BLD AUTO: 0.04 10*3/MM3 (ref 0–0.2)
BASOPHILS NFR BLD AUTO: 0.4 % (ref 0–1.5)
BILIRUB SERPL-MCNC: 0.3 MG/DL (ref 0–1.2)
BILIRUB UR QL STRIP: NEGATIVE
BUN SERPL-MCNC: 23 MG/DL (ref 6–20)
BUN/CREAT SERPL: 14 (ref 7–25)
CALCIUM SPEC-SCNC: 9.3 MG/DL (ref 8.6–10.5)
CHLORIDE SERPL-SCNC: 100 MMOL/L (ref 98–107)
CLARITY UR: CLEAR
CO2 SERPL-SCNC: 26.6 MMOL/L (ref 22–29)
COLOR UR: YELLOW
CREAT SERPL-MCNC: 1.64 MG/DL (ref 0.76–1.27)
D-LACTATE SERPL-SCNC: 1 MMOL/L (ref 0.5–2)
DEPRECATED RDW RBC AUTO: 38.3 FL (ref 37–54)
EGFRCR SERPLBLD CKD-EPI 2021: 50.3 ML/MIN/1.73
EOSINOPHIL # BLD AUTO: 0.14 10*3/MM3 (ref 0–0.4)
EOSINOPHIL NFR BLD AUTO: 1.3 % (ref 0.3–6.2)
ERYTHROCYTE [DISTWIDTH] IN BLOOD BY AUTOMATED COUNT: 12.5 % (ref 12.3–15.4)
GLOBULIN UR ELPH-MCNC: 2.6 GM/DL
GLUCOSE SERPL-MCNC: 111 MG/DL (ref 65–99)
GLUCOSE UR STRIP-MCNC: NEGATIVE MG/DL
HCT VFR BLD AUTO: 48.7 % (ref 37.5–51)
HGB BLD-MCNC: 16.9 G/DL (ref 13–17.7)
HGB UR QL STRIP.AUTO: NEGATIVE
HOLD SPECIMEN: NORMAL
HOLD SPECIMEN: NORMAL
IMM GRANULOCYTES # BLD AUTO: 0.03 10*3/MM3 (ref 0–0.05)
IMM GRANULOCYTES NFR BLD AUTO: 0.3 % (ref 0–0.5)
INR PPP: 0.93 (ref 0.9–1.1)
KETONES UR QL STRIP: NEGATIVE
LEUKOCYTE ESTERASE UR QL STRIP.AUTO: NEGATIVE
LIPASE SERPL-CCNC: 48 U/L (ref 13–60)
LYMPHOCYTES # BLD AUTO: 2.31 10*3/MM3 (ref 0.7–3.1)
LYMPHOCYTES NFR BLD AUTO: 21.8 % (ref 19.6–45.3)
MCH RBC QN AUTO: 29.5 PG (ref 26.6–33)
MCHC RBC AUTO-ENTMCNC: 34.7 G/DL (ref 31.5–35.7)
MCV RBC AUTO: 85.1 FL (ref 79–97)
MONOCYTES # BLD AUTO: 0.57 10*3/MM3 (ref 0.1–0.9)
MONOCYTES NFR BLD AUTO: 5.4 % (ref 5–12)
NEUTROPHILS NFR BLD AUTO: 7.49 10*3/MM3 (ref 1.7–7)
NEUTROPHILS NFR BLD AUTO: 70.8 % (ref 42.7–76)
NITRITE UR QL STRIP: NEGATIVE
NRBC BLD AUTO-RTO: 0 /100 WBC (ref 0–0.2)
PH UR STRIP.AUTO: <=5 [PH] (ref 5–8)
PLATELET # BLD AUTO: 159 10*3/MM3 (ref 140–450)
PMV BLD AUTO: 9.1 FL (ref 6–12)
POTASSIUM SERPL-SCNC: 4.6 MMOL/L (ref 3.5–5.2)
PROT SERPL-MCNC: 6.8 G/DL (ref 6–8.5)
PROT UR QL STRIP: NEGATIVE
PROTHROMBIN TIME: 12.9 SECONDS (ref 12.1–14.7)
RBC # BLD AUTO: 5.72 10*6/MM3 (ref 4.14–5.8)
SODIUM SERPL-SCNC: 137 MMOL/L (ref 136–145)
SP GR UR STRIP: 1.02 (ref 1–1.03)
UROBILINOGEN UR QL STRIP: NORMAL
WBC NRBC COR # BLD AUTO: 10.58 10*3/MM3 (ref 3.4–10.8)
WHOLE BLOOD HOLD COAG: NORMAL
WHOLE BLOOD HOLD SPECIMEN: NORMAL

## 2024-02-29 PROCEDURE — 80053 COMPREHEN METABOLIC PANEL: CPT | Performed by: STUDENT IN AN ORGANIZED HEALTH CARE EDUCATION/TRAINING PROGRAM

## 2024-02-29 PROCEDURE — 83605 ASSAY OF LACTIC ACID: CPT | Performed by: STUDENT IN AN ORGANIZED HEALTH CARE EDUCATION/TRAINING PROGRAM

## 2024-02-29 PROCEDURE — 25010000002 PROCHLORPERAZINE 10 MG/2ML SOLUTION: Performed by: STUDENT IN AN ORGANIZED HEALTH CARE EDUCATION/TRAINING PROGRAM

## 2024-02-29 PROCEDURE — 25810000003 SODIUM CHLORIDE 0.9 % SOLUTION: Performed by: STUDENT IN AN ORGANIZED HEALTH CARE EDUCATION/TRAINING PROGRAM

## 2024-02-29 PROCEDURE — 96376 TX/PRO/DX INJ SAME DRUG ADON: CPT

## 2024-02-29 PROCEDURE — 85025 COMPLETE CBC W/AUTO DIFF WBC: CPT | Performed by: STUDENT IN AN ORGANIZED HEALTH CARE EDUCATION/TRAINING PROGRAM

## 2024-02-29 PROCEDURE — 81003 URINALYSIS AUTO W/O SCOPE: CPT | Performed by: STUDENT IN AN ORGANIZED HEALTH CARE EDUCATION/TRAINING PROGRAM

## 2024-02-29 PROCEDURE — 99283 EMERGENCY DEPT VISIT LOW MDM: CPT

## 2024-02-29 PROCEDURE — 96361 HYDRATE IV INFUSION ADD-ON: CPT

## 2024-02-29 PROCEDURE — 83690 ASSAY OF LIPASE: CPT | Performed by: STUDENT IN AN ORGANIZED HEALTH CARE EDUCATION/TRAINING PROGRAM

## 2024-02-29 PROCEDURE — 25010000002 KETOROLAC TROMETHAMINE PER 15 MG: Performed by: STUDENT IN AN ORGANIZED HEALTH CARE EDUCATION/TRAINING PROGRAM

## 2024-02-29 PROCEDURE — 25010000002 HYDROMORPHONE 1 MG/ML SOLUTION: Performed by: STUDENT IN AN ORGANIZED HEALTH CARE EDUCATION/TRAINING PROGRAM

## 2024-02-29 PROCEDURE — 25010000002 ONDANSETRON PER 1 MG: Performed by: STUDENT IN AN ORGANIZED HEALTH CARE EDUCATION/TRAINING PROGRAM

## 2024-02-29 PROCEDURE — 96374 THER/PROPH/DIAG INJ IV PUSH: CPT

## 2024-02-29 PROCEDURE — 85610 PROTHROMBIN TIME: CPT | Performed by: STUDENT IN AN ORGANIZED HEALTH CARE EDUCATION/TRAINING PROGRAM

## 2024-02-29 PROCEDURE — 96375 TX/PRO/DX INJ NEW DRUG ADDON: CPT

## 2024-02-29 RX ORDER — ONDANSETRON 2 MG/ML
4 INJECTION INTRAMUSCULAR; INTRAVENOUS ONCE
Status: COMPLETED | OUTPATIENT
Start: 2024-02-29 | End: 2024-02-29

## 2024-02-29 RX ORDER — TAMSULOSIN HYDROCHLORIDE 0.4 MG/1
0.4 CAPSULE ORAL ONCE
Status: COMPLETED | OUTPATIENT
Start: 2024-02-29 | End: 2024-02-29

## 2024-02-29 RX ORDER — OXYCODONE AND ACETAMINOPHEN 10; 325 MG/1; MG/1
1 TABLET ORAL ONCE
Status: COMPLETED | OUTPATIENT
Start: 2024-02-29 | End: 2024-02-29

## 2024-02-29 RX ORDER — KETOROLAC TROMETHAMINE 30 MG/ML
30 INJECTION, SOLUTION INTRAMUSCULAR; INTRAVENOUS ONCE
Status: COMPLETED | OUTPATIENT
Start: 2024-02-29 | End: 2024-02-29

## 2024-02-29 RX ORDER — SODIUM CHLORIDE 0.9 % (FLUSH) 0.9 %
10 SYRINGE (ML) INJECTION AS NEEDED
Status: DISCONTINUED | OUTPATIENT
Start: 2024-02-29 | End: 2024-03-01 | Stop reason: HOSPADM

## 2024-02-29 RX ORDER — PROCHLORPERAZINE EDISYLATE 5 MG/ML
10 INJECTION INTRAMUSCULAR; INTRAVENOUS ONCE
Status: COMPLETED | OUTPATIENT
Start: 2024-02-29 | End: 2024-02-29

## 2024-02-29 RX ADMIN — PROCHLORPERAZINE EDISYLATE 10 MG: 5 INJECTION INTRAMUSCULAR; INTRAVENOUS at 21:07

## 2024-02-29 RX ADMIN — HYDROMORPHONE HYDROCHLORIDE 1 MG: 1 INJECTION, SOLUTION INTRAMUSCULAR; INTRAVENOUS; SUBCUTANEOUS at 19:09

## 2024-02-29 RX ADMIN — ONDANSETRON 4 MG: 2 INJECTION INTRAMUSCULAR; INTRAVENOUS at 19:08

## 2024-02-29 RX ADMIN — HYDROMORPHONE HYDROCHLORIDE 1 MG: 1 INJECTION, SOLUTION INTRAMUSCULAR; INTRAVENOUS; SUBCUTANEOUS at 21:07

## 2024-02-29 RX ADMIN — OXYCODONE AND ACETAMINOPHEN 1 TABLET: 10; 325 TABLET ORAL at 19:48

## 2024-02-29 RX ADMIN — OXYCODONE AND ACETAMINOPHEN 1 TABLET: 10; 325 TABLET ORAL at 21:07

## 2024-02-29 RX ADMIN — KETOROLAC TROMETHAMINE 30 MG: 30 INJECTION, SOLUTION INTRAMUSCULAR; INTRAVENOUS at 19:08

## 2024-02-29 RX ADMIN — SODIUM CHLORIDE 1000 ML: 9 INJECTION, SOLUTION INTRAVENOUS at 19:49

## 2024-02-29 RX ADMIN — TAMSULOSIN HYDROCHLORIDE 0.4 MG: 0.4 CAPSULE ORAL at 19:09

## 2024-02-29 NOTE — ED PROVIDER NOTES
Subjective   History of Present Illness  Patient is a 53-year-old male who was seen here yesterday and diagnosed with left-sided ureterolithiasis comes to the ER with acute pain.  He says the pain medicine he was sent home with has not been able to treat the pain.  He says he needs something to help knock the pain that way he can make it till tomorrow when they plan for an outpatient cystoscopy with urology.  No fevers or chills.  Having difficulty urinating.  Says he has passed 2 small stones this morning but feels like the bigger stone is migrated down causes pain pattern has changed.  Complains of left flank pain with radiation to the left testicle.      Review of Systems   Constitutional:  Negative for chills, fatigue and fever.   HENT:  Negative for congestion, sinus pain and sore throat.    Respiratory:  Negative for cough, chest tightness, shortness of breath and wheezing.    Cardiovascular:  Negative for chest pain, palpitations and leg swelling.   Gastrointestinal:  Negative for abdominal pain, constipation, diarrhea, nausea and vomiting.   Genitourinary:  Positive for difficulty urinating and flank pain. Negative for dysuria, frequency, hematuria and urgency.   Musculoskeletal:  Negative for arthralgias and myalgias.   Neurological:  Negative for dizziness, numbness and headaches.   Psychiatric/Behavioral:  Negative for confusion.        Past Medical History:   Diagnosis Date    Back pain     Cancer     PAROTID CANCER    Degenerative joint disease     Diabetes mellitus     DVT, lower extremity     left 2013    GERD (gastroesophageal reflux disease)     Kidney stones     Neuropathy     FEET     Peroneal DVT (deep venous thrombosis)     in 1998 and dvt 2010    Pulmonary embolism     Spinal stenosis     Urolithiasis     Vertigo     OCASSIONALLY       Allergies   Allergen Reactions    Morphine Seizure       Past Surgical History:   Procedure Laterality Date    COLONOSCOPY      CYSTOSCOPY W/ LITHOLAPAXY / EHL       INNER EAR SURGERY      OTHER SURGICAL HISTORY      Mass removed from left side of face    VASECTOMY Bilateral 3/4/2020    Procedure: VASECTOMY;  Surgeon: Angel Adkins MD;  Location: [facility];  Service: Urology;  Laterality: Bilateral;       Family History   Problem Relation Age of Onset    Other Other         deveral siblings tatyana had early onset DVTs    No Known Problems Mother     No Known Problems Father        Social History     Socioeconomic History    Marital status:    Tobacco Use    Smoking status: Former     Years: 20     Types: Cigarettes     Quit date:      Years since quittin.1    Smokeless tobacco: Current     Types: Chew    Tobacco comments:     occasionally uses chew once a year    Substance and Sexual Activity    Alcohol use: No     Comment: beer occasionally    Drug use: No    Sexual activity: Defer           Objective   Physical Exam  Vitals and nursing note reviewed. Exam conducted with a chaperone present.   Constitutional:       General: He is not in acute distress.     Appearance: He is well-developed and normal weight. He is not ill-appearing.   HENT:      Head: Normocephalic.      Mouth/Throat:      Mouth: Mucous membranes are moist.      Pharynx: Oropharynx is clear.   Eyes:      Extraocular Movements: Extraocular movements intact.      Pupils: Pupils are equal, round, and reactive to light.   Neck:      Vascular: No JVD.   Cardiovascular:      Rate and Rhythm: Normal rate and regular rhythm.      Heart sounds: No murmur heard.     No friction rub. No gallop.   Pulmonary:      Effort: Pulmonary effort is normal. No tachypnea.      Breath sounds: Normal breath sounds. No decreased breath sounds, wheezing, rhonchi or rales.   Abdominal:      General: Bowel sounds are normal.      Palpations: Abdomen is soft.      Tenderness: There is left CVA tenderness.   Musculoskeletal:         General: Normal range of motion.      Cervical back: Normal range of motion and  neck supple.      Right lower leg: No edema.      Left lower leg: No edema.   Skin:     General: Skin is warm and dry.      Capillary Refill: Capillary refill takes less than 2 seconds.   Neurological:      General: No focal deficit present.      Mental Status: He is alert and oriented to person, place, and time.   Psychiatric:         Mood and Affect: Mood normal.         Behavior: Behavior normal.         Procedures           ED Course                                             Medical Decision Making  --Patient is hemodynamically stable, hypertensive due to pain, left flank pain with known left ureteral lithiasis  --Labs with elevated creatinine  --IV Toradol, Dilaudid, Percocet  --Patient felt better after IV fluids and pain control, stated he felt like he could make it at home overnight, has cystoscopy planned already for tomorrow morning, instructed him to keep preop instructions as previously recommended and follow-up in the morning for cystoscopy and stone extraction    Problems Addressed:  Ureterolithiasis: complicated acute illness or injury    Amount and/or Complexity of Data Reviewed  Labs: ordered.    Risk  Prescription drug management.        Final diagnoses:   Ureterolithiasis       ED Disposition  ED Disposition       ED Disposition   Discharge    Condition   Stable    Comment   --               Arnol Torres MD  [address]    [facility] [address]  [phone]    In 1 week      Angel Adkins MD  [address] [facility]    [facility] [address]  [phone]    Go in 1 day           Medication List      No changes were made to your prescriptions during this visit.            Matthew Araiza DO  02/29/24 3533

## 2024-03-01 ENCOUNTER — APPOINTMENT (OUTPATIENT)
Dept: CT IMAGING | Facility: HOSPITAL | Age: 54
End: 2024-03-01
Payer: COMMERCIAL

## 2024-03-01 ENCOUNTER — OFFICE VISIT (OUTPATIENT)
Dept: UROLOGY | Facility: CLINIC | Age: 54
End: 2024-03-01
Payer: COMMERCIAL

## 2024-03-01 ENCOUNTER — ANESTHESIA EVENT (OUTPATIENT)
Dept: PERIOP | Facility: HOSPITAL | Age: 54
End: 2024-03-01
Payer: COMMERCIAL

## 2024-03-01 ENCOUNTER — APPOINTMENT (OUTPATIENT)
Dept: GENERAL RADIOLOGY | Facility: HOSPITAL | Age: 54
End: 2024-03-01
Payer: COMMERCIAL

## 2024-03-01 ENCOUNTER — ANESTHESIA (OUTPATIENT)
Dept: PERIOP | Facility: HOSPITAL | Age: 54
End: 2024-03-01
Payer: COMMERCIAL

## 2024-03-01 ENCOUNTER — HOSPITAL ENCOUNTER (OUTPATIENT)
Dept: GENERAL RADIOLOGY | Facility: HOSPITAL | Age: 54
Discharge: HOME | End: 2024-03-01
Payer: COMMERCIAL

## 2024-03-01 ENCOUNTER — HOSPITAL ENCOUNTER (OUTPATIENT)
Facility: HOSPITAL | Age: 54
Setting detail: HOSPITAL OUTPATIENT SURGERY
Discharge: HOME | End: 2024-03-01
Attending: UROLOGY | Admitting: UROLOGY
Payer: COMMERCIAL

## 2024-03-01 VITALS
HEIGHT: 70 IN | BODY MASS INDEX: 39.37 KG/M2 | WEIGHT: 275 LBS | DIASTOLIC BLOOD PRESSURE: 69 MMHG | HEART RATE: 82 BPM | SYSTOLIC BLOOD PRESSURE: 107 MMHG

## 2024-03-01 VITALS
WEIGHT: 275 LBS | TEMPERATURE: 98.6 F | SYSTOLIC BLOOD PRESSURE: 118 MMHG | HEART RATE: 84 BPM | HEIGHT: 70 IN | RESPIRATION RATE: 18 BRPM | DIASTOLIC BLOOD PRESSURE: 73 MMHG | OXYGEN SATURATION: 98 % | BODY MASS INDEX: 39.37 KG/M2

## 2024-03-01 DIAGNOSIS — N20.1 LEFT URETERAL STONE: ICD-10-CM

## 2024-03-01 DIAGNOSIS — N20.1 LEFT URETERAL STONE: Primary | ICD-10-CM

## 2024-03-01 LAB — GLUCOSE BLDC GLUCOMTR-MCNC: 99 MG/DL (ref 70–130)

## 2024-03-01 PROCEDURE — 82948 REAGENT STRIP/BLOOD GLUCOSE: CPT

## 2024-03-01 PROCEDURE — 25510000001 IOPAMIDOL 61 % SOLUTION: Performed by: UROLOGY

## 2024-03-01 PROCEDURE — 25010000002 PROPOFOL 200 MG/20ML EMULSION: Performed by: NURSE ANESTHETIST, CERTIFIED REGISTERED

## 2024-03-01 PROCEDURE — C2617 STENT, NON-COR, TEM W/O DEL: HCPCS | Performed by: UROLOGY

## 2024-03-01 PROCEDURE — 76000 FLUOROSCOPY <1 HR PHYS/QHP: CPT

## 2024-03-01 PROCEDURE — C1769 GUIDE WIRE: HCPCS | Performed by: UROLOGY

## 2024-03-01 PROCEDURE — 74176 CT ABD & PELVIS W/O CONTRAST: CPT

## 2024-03-01 PROCEDURE — 74420 UROGRAPHY RTRGR +-KUB: CPT | Performed by: UROLOGY

## 2024-03-01 PROCEDURE — 25810000003 LACTATED RINGERS PER 1000 ML: Performed by: ANESTHESIOLOGY

## 2024-03-01 PROCEDURE — 52356 CYSTO/URETERO W/LITHOTRIPSY: CPT | Performed by: UROLOGY

## 2024-03-01 PROCEDURE — 25010000002 GENTAMICIN PER 80 MG

## 2024-03-01 PROCEDURE — C1894 INTRO/SHEATH, NON-LASER: HCPCS | Performed by: UROLOGY

## 2024-03-01 PROCEDURE — 25010000002 MIDAZOLAM PER 1 MG: Performed by: ANESTHESIOLOGY

## 2024-03-01 PROCEDURE — 25010000002 FENTANYL CITRATE (PF) 50 MCG/ML SOLUTION: Performed by: NURSE ANESTHETIST, CERTIFIED REGISTERED

## 2024-03-01 PROCEDURE — 74018 RADEX ABDOMEN 1 VIEW: CPT

## 2024-03-01 DEVICE — URETERAL STENT
Type: IMPLANTABLE DEVICE | Site: URETER | Status: FUNCTIONAL
Brand: POLARIS™ ULTRA

## 2024-03-01 RX ORDER — SODIUM CHLORIDE 0.9 % (FLUSH) 0.9 %
10 SYRINGE (ML) INJECTION EVERY 12 HOURS SCHEDULED
Status: DISCONTINUED | OUTPATIENT
Start: 2024-03-01 | End: 2024-03-01 | Stop reason: HOSPADM

## 2024-03-01 RX ORDER — SODIUM CHLORIDE 0.9 % (FLUSH) 0.9 %
10 SYRINGE (ML) INJECTION AS NEEDED
Status: DISCONTINUED | OUTPATIENT
Start: 2024-03-01 | End: 2024-03-01 | Stop reason: HOSPADM

## 2024-03-01 RX ORDER — FENTANYL CITRATE 50 UG/ML
INJECTION, SOLUTION INTRAMUSCULAR; INTRAVENOUS AS NEEDED
Status: DISCONTINUED | OUTPATIENT
Start: 2024-03-01 | End: 2024-03-01 | Stop reason: SURG

## 2024-03-01 RX ORDER — MAGNESIUM HYDROXIDE 1200 MG/15ML
LIQUID ORAL AS NEEDED
Status: DISCONTINUED | OUTPATIENT
Start: 2024-03-01 | End: 2024-03-01 | Stop reason: HOSPADM

## 2024-03-01 RX ORDER — FENTANYL CITRATE 50 UG/ML
50 INJECTION, SOLUTION INTRAMUSCULAR; INTRAVENOUS
Status: DISCONTINUED | OUTPATIENT
Start: 2024-03-01 | End: 2024-03-01 | Stop reason: HOSPADM

## 2024-03-01 RX ORDER — PROPOFOL 10 MG/ML
INJECTION, EMULSION INTRAVENOUS AS NEEDED
Status: DISCONTINUED | OUTPATIENT
Start: 2024-03-01 | End: 2024-03-01 | Stop reason: SURG

## 2024-03-01 RX ORDER — SODIUM CHLORIDE, SODIUM LACTATE, POTASSIUM CHLORIDE, CALCIUM CHLORIDE 600; 310; 30; 20 MG/100ML; MG/100ML; MG/100ML; MG/100ML
100 INJECTION, SOLUTION INTRAVENOUS ONCE AS NEEDED
Status: DISCONTINUED | OUTPATIENT
Start: 2024-03-01 | End: 2024-03-01 | Stop reason: HOSPADM

## 2024-03-01 RX ORDER — GENTAMICIN SULFATE 80 MG/100ML
80 INJECTION, SOLUTION INTRAVENOUS ONCE
Status: CANCELLED | OUTPATIENT
Start: 2024-03-01 | End: 2024-03-01

## 2024-03-01 RX ORDER — ONDANSETRON 2 MG/ML
4 INJECTION INTRAMUSCULAR; INTRAVENOUS AS NEEDED
Status: DISCONTINUED | OUTPATIENT
Start: 2024-03-01 | End: 2024-03-01 | Stop reason: HOSPADM

## 2024-03-01 RX ORDER — PANTOPRAZOLE SODIUM 20 MG/1
20 TABLET, DELAYED RELEASE ORAL DAILY
COMMUNITY

## 2024-03-01 RX ORDER — IPRATROPIUM BROMIDE AND ALBUTEROL SULFATE 2.5; .5 MG/3ML; MG/3ML
3 SOLUTION RESPIRATORY (INHALATION) ONCE AS NEEDED
Status: DISCONTINUED | OUTPATIENT
Start: 2024-03-01 | End: 2024-03-01 | Stop reason: HOSPADM

## 2024-03-01 RX ORDER — SODIUM CHLORIDE 9 MG/ML
40 INJECTION, SOLUTION INTRAVENOUS AS NEEDED
Status: DISCONTINUED | OUTPATIENT
Start: 2024-03-01 | End: 2024-03-01 | Stop reason: HOSPADM

## 2024-03-01 RX ORDER — MIDAZOLAM HYDROCHLORIDE 1 MG/ML
1 INJECTION INTRAMUSCULAR; INTRAVENOUS
Status: COMPLETED | OUTPATIENT
Start: 2024-03-01 | End: 2024-03-01

## 2024-03-01 RX ORDER — KETOROLAC TROMETHAMINE 30 MG/ML
30 INJECTION, SOLUTION INTRAMUSCULAR; INTRAVENOUS EVERY 6 HOURS PRN
Status: DISCONTINUED | OUTPATIENT
Start: 2024-03-01 | End: 2024-03-01 | Stop reason: HOSPADM

## 2024-03-01 RX ORDER — FAMOTIDINE 10 MG/ML
INJECTION, SOLUTION INTRAVENOUS AS NEEDED
Status: DISCONTINUED | OUTPATIENT
Start: 2024-03-01 | End: 2024-03-01 | Stop reason: SURG

## 2024-03-01 RX ORDER — GENTAMICIN SULFATE 80 MG/100ML
80 INJECTION, SOLUTION INTRAVENOUS ONCE
Status: DISCONTINUED | OUTPATIENT
Start: 2024-03-01 | End: 2024-03-01 | Stop reason: HOSPADM

## 2024-03-01 RX ORDER — LIDOCAINE HYDROCHLORIDE 20 MG/ML
JELLY TOPICAL AS NEEDED
Status: DISCONTINUED | OUTPATIENT
Start: 2024-03-01 | End: 2024-03-01 | Stop reason: HOSPADM

## 2024-03-01 RX ORDER — SODIUM CHLORIDE, SODIUM LACTATE, POTASSIUM CHLORIDE, CALCIUM CHLORIDE 600; 310; 30; 20 MG/100ML; MG/100ML; MG/100ML; MG/100ML
125 INJECTION, SOLUTION INTRAVENOUS ONCE
Status: COMPLETED | OUTPATIENT
Start: 2024-03-01 | End: 2024-03-01

## 2024-03-01 RX ORDER — OXYCODONE HYDROCHLORIDE AND ACETAMINOPHEN 5; 325 MG/1; MG/1
1 TABLET ORAL ONCE AS NEEDED
Status: DISCONTINUED | OUTPATIENT
Start: 2024-03-01 | End: 2024-03-01 | Stop reason: HOSPADM

## 2024-03-01 RX ORDER — OXYCODONE AND ACETAMINOPHEN 10; 325 MG/1; MG/1
1 TABLET ORAL EVERY 6 HOURS PRN
Qty: 16 TABLET | Refills: 0 | Status: SHIPPED | OUTPATIENT
Start: 2024-03-01

## 2024-03-01 RX ORDER — MEPERIDINE HYDROCHLORIDE 25 MG/ML
12.5 INJECTION INTRAMUSCULAR; INTRAVENOUS; SUBCUTANEOUS
Status: DISCONTINUED | OUTPATIENT
Start: 2024-03-01 | End: 2024-03-01 | Stop reason: HOSPADM

## 2024-03-01 RX ORDER — TIRZEPATIDE 10 MG/.5ML
10 INJECTION, SOLUTION SUBCUTANEOUS WEEKLY
COMMUNITY

## 2024-03-01 RX ADMIN — FENTANYL CITRATE 50 MCG: 50 INJECTION INTRAMUSCULAR; INTRAVENOUS at 13:47

## 2024-03-01 RX ADMIN — SODIUM CHLORIDE, POTASSIUM CHLORIDE, SODIUM LACTATE AND CALCIUM CHLORIDE: 600; 310; 30; 20 INJECTION, SOLUTION INTRAVENOUS at 13:33

## 2024-03-01 RX ADMIN — FENTANYL CITRATE 50 MCG: 50 INJECTION INTRAMUSCULAR; INTRAVENOUS at 13:51

## 2024-03-01 RX ADMIN — FAMOTIDINE 20 MG: 10 INJECTION, SOLUTION INTRAVENOUS at 13:32

## 2024-03-01 RX ADMIN — MIDAZOLAM HYDROCHLORIDE 1 MG: 1 INJECTION, SOLUTION INTRAMUSCULAR; INTRAVENOUS at 12:12

## 2024-03-01 RX ADMIN — MIDAZOLAM HYDROCHLORIDE 1 MG: 1 INJECTION, SOLUTION INTRAMUSCULAR; INTRAVENOUS at 12:22

## 2024-03-01 RX ADMIN — PROPOFOL 150 MG: 10 INJECTION, EMULSION INTRAVENOUS at 13:33

## 2024-03-01 NOTE — PROGRESS NOTES
"Chief Complaint:    Chief Complaint   Patient presents with    Kidney Stone       Vital Signs:   /69   Pulse 82   Ht 177.8 cm (70\")   Wt 125 kg (275 lb)   BMI 39.46 kg/m²   Body mass index is 39.46 kg/m².      HPI:  Alek Munoz is a 53 y.o. male who presents today for follow up    History of Present Illness  Mr. Munoz presents to the clinic for follow-up for nephrolithiasis.  He was initially seen in the emergency department on 2/28/2024 secondary to left-sided back and flank pain.  He had a CT scan abdomen pelvis completed at that time that showed a proximal 5 mm mildly obstructing ureteral calculus.  Patient did not wish to undergo surgical intervention at that time and was discharged home with Toradol and Flomax to take once daily.  Presented back to the emergency department late yesterday evening secondary to uncontrolled pain.  He was given IV pain medications and was sent home for reevaluation this morning.  Did have a slight decrease in overall kidney function from a GFR of 80 to roughly 50.  Creatinine also increased as well.  He presents to the clinic and states that pain was controlled with medication but still roughly 8 AM this morning.  He endorses persistent left-sided mid back with radiation to the flank.  He also endorses some intermittent testicular pain as well.  I did complete a KUB that showed a significantly large stool burden but his left ureteral stone cannot be well identified.  Did discuss this case with Dr. Adkins and will obtain a stat CT and schedule him for a right uteroscopy with stent placement this morning.      Past Medical History:  Past Medical History:   Diagnosis Date    Back pain     Cancer     PAROTID CANCER    Degenerative joint disease     Diabetes mellitus     DVT, lower extremity     left 2013    GERD (gastroesophageal reflux disease)     Kidney stones     Neuropathy     FEET     Peroneal DVT (deep venous thrombosis)     in 1998 and dvt 2010    Pulmonary embolism "     Spinal stenosis     Urolithiasis     Vertigo     OCASSIONALLY       Current Meds:  No current facility-administered medications for this visit.     No current outpatient medications on file.        Allergies:   Allergies   Allergen Reactions    Morphine Seizure        Past Surgical History:  Past Surgical History:   Procedure Laterality Date    COLONOSCOPY      CYSTOSCOPY W/ LITHOLAPAXY / EHL      INNER EAR SURGERY      OTHER SURGICAL HISTORY      Mass removed from left side of face    VASECTOMY Bilateral 3/4/2020    Procedure: VASECTOMY;  Surgeon: Angel Adkins MD;  Location: [facility];  Service: Urology;  Laterality: Bilateral;       Social History:  Social History     Socioeconomic History    Marital status:    Tobacco Use    Smoking status: Former     Years: 20     Types: Cigarettes     Quit date:      Years since quittin.1    Smokeless tobacco: Current     Types: Chew    Tobacco comments:     occasionally uses chew once a year    Vaping Use    Vaping Use: Never used   Substance and Sexual Activity    Alcohol use: No     Comment: beer occasionally    Drug use: No    Sexual activity: Defer       Family History:  Family History   Problem Relation Age of Onset    Other Other         deveral siblings havd had early onset DVTs    No Known Problems Mother     No Known Problems Father        Review of Systems:  Review of Systems   Constitutional:  Negative for fatigue, fever and unexpected weight change.   Respiratory:  Negative for chest tightness and shortness of breath.    Cardiovascular:  Negative for chest pain.   Gastrointestinal:  Negative for abdominal pain, constipation, diarrhea, nausea and vomiting.   Genitourinary:  Positive for difficulty urinating, flank pain, frequency, testicular pain and urgency. Negative for dysuria.   Musculoskeletal:  Positive for back pain.   Skin:  Negative for rash.   Psychiatric/Behavioral:  Negative for confusion and suicidal ideas.        Physical  Exam:  Physical Exam  Constitutional:       General: He is not in acute distress.     Appearance: Normal appearance.   HENT:      Head: Normocephalic and atraumatic.      Nose: Nose normal.      Mouth/Throat:      Mouth: Mucous membranes are moist.   Eyes:      Conjunctiva/sclera: Conjunctivae normal.   Cardiovascular:      Rate and Rhythm: Normal rate and regular rhythm.      Pulses: Normal pulses.      Heart sounds: Normal heart sounds.   Pulmonary:      Effort: Pulmonary effort is normal.      Breath sounds: Normal breath sounds.   Abdominal:      General: Bowel sounds are normal.      Palpations: Abdomen is soft.      Tenderness: There is no right CVA tenderness or left CVA tenderness.   Musculoskeletal:         General: Normal range of motion.      Cervical back: Normal range of motion.   Skin:     General: Skin is warm.   Neurological:      General: No focal deficit present.      Mental Status: He is alert and oriented to person, place, and time.   Psychiatric:         Mood and Affect: Mood normal.         Behavior: Behavior normal.         Thought Content: Thought content normal.         Judgment: Judgment normal.         Recent Image (CT and/or KUB):   CT Abdomen and Pelvis: No results found for this or any previous visit.     CT Stone Protocol: Results for orders placed during the hospital encounter of 02/28/24    CT Abdomen Pelvis Stone Protocol    Narrative  PROCEDURE: CT of the abdomen and pelvis performed without IV contrast on  February 28, 2024. The examination was performed with 4 mm axial imaging  and 4 mm sagittal and coronal reconstruction images. The examination was  performed according to as low as reasonably achievable dose protocol.    HISTORY: Left flank pain.    COMPARISON: None.    FINDINGS:    Left lower lobe calcified granuloma.  Small bands of scar versus atelectasis in the left lung base and lower  lingula.  Old fractures involving the lower lateral left ribs.  No pleural effusion or  pneumothorax.  Normal heart size with no pericardial effusion.  No acute process seen in the liver, spleen, gallbladder, pancreas,  adrenal glands, and abdominal aorta.  There is a stone in the proximal left ureter seen best on image 74,  series 2 and this measures 5 mm. Partial left renal obstruction.  Right nephrolithiasis.  Normal appendix is well seen.  Very small umbilical hernia contains only fat.  Mild enlarged prostate gland.  Small right and left inguinal hernias contain only fat.  Mild osteoarthritis at the right and left hip joint.  No abdominal aortic aneurysm or retroperitoneal hemorrhage.  No free fluid or free air.  No abscess.  Mild constipation involving the colon.  No features to suggest cystitis.    Impression  1.  5 mm stone in the proximal left ureter with associated partial left  renal obstruction.  2.  Nonobstructive right nephrolithiasis.  3.  Normal appendix.  4.  Mild constipation throughout the colon.  5.  Mild enlarged prostate gland.  6.  No free fluid or free air  7.  No abscess or hematoma.  8.  Mild degenerative disc disease in the lumbar spine with endplate and  facet hypertrophic changes.    This report was finalized on 2/28/2024 7:41 AM by Arnol Tee MD.     KUB: Results for orders placed in visit on 03/01/24    XR abdomen kub    Narrative  EXAMINATION: XR ABDOMEN KUB-    CLINICAL INDICATION: left proximal ureteral stone; N20.1-Calculus of  ureter      COMPARISON: None available    FINDINGS: 2 views of the abdomen    Large stool burden    No suspicious calcifications seen on the presented study.    Impression  Large stool burden      This report was finalized on 3/1/2024 10:29 AM by Dr. Rush Amin MD.       Labs:  Brief Urine Lab Results  (Last result in the past 365 days)        Color   Clarity   Blood   Leuk Est   Nitrite   Protein   CREAT   Urine HCG        02/29/24 1958 Yellow   Clear   Negative   Negative   Negative   Negative                 Admission on 02/29/2024,  Discharged on 02/29/2024   Component Date Value Ref Range Status    Glucose 02/29/2024 111 (H)  65 - 99 mg/dL Final    BUN 02/29/2024 23 (H)  6 - 20 mg/dL Final    Creatinine 02/29/2024 1.64 (H)  0.76 - 1.27 mg/dL Final    Sodium 02/29/2024 137  136 - 145 mmol/L Final    Potassium 02/29/2024 4.6  3.5 - 5.2 mmol/L Final    Slight hemolysis detected by analyzer. Result may be falsely elevated.    Chloride 02/29/2024 100  98 - 107 mmol/L Final    CO2 02/29/2024 26.6  22.0 - 29.0 mmol/L Final    Calcium 02/29/2024 9.3  8.6 - 10.5 mg/dL Final    Total Protein 02/29/2024 6.8  6.0 - 8.5 g/dL Final    Albumin 02/29/2024 4.2  3.5 - 5.2 g/dL Final    ALT (SGPT) 02/29/2024 33  1 - 41 U/L Final    AST (SGOT) 02/29/2024 22  1 - 40 U/L Final    Alkaline Phosphatase 02/29/2024 72  39 - 117 U/L Final    Total Bilirubin 02/29/2024 0.3  0.0 - 1.2 mg/dL Final    Globulin 02/29/2024 2.6  gm/dL Final    A/G Ratio 02/29/2024 1.6  g/dL Final    BUN/Creatinine Ratio 02/29/2024 14.0  7.0 - 25.0 Final    Anion Gap 02/29/2024 10.4  5.0 - 15.0 mmol/L Final    eGFR 02/29/2024 50.3 (L)  >60.0 mL/min/1.73 Final    Lipase 02/29/2024 48  13 - 60 U/L Final    Lactate 02/29/2024 1.0  0.5 - 2.0 mmol/L Final    Protime 02/29/2024 12.9  12.1 - 14.7 Seconds Final    INR 02/29/2024 0.93  0.90 - 1.10 Final    Color, UA 02/29/2024 Yellow  Yellow, Straw Final    Appearance, UA 02/29/2024 Clear  Clear Final    pH, UA 02/29/2024 <=5.0  5.0 - 8.0 Final    Specific Gravity, UA 02/29/2024 1.025  1.005 - 1.030 Final    Glucose, UA 02/29/2024 Negative  Negative Final    Ketones, UA 02/29/2024 Negative  Negative Final    Bilirubin, UA 02/29/2024 Negative  Negative Final    Blood, UA 02/29/2024 Negative  Negative Final    Protein, UA 02/29/2024 Negative  Negative Final    Leuk Esterase, UA 02/29/2024 Negative  Negative Final    Nitrite, UA 02/29/2024 Negative  Negative Final    Urobilinogen, UA 02/29/2024 0.2 E.U./dL  0.2 - 1.0 E.U./dL Final    Extra Tube 02/29/2024  Hold for add-ons.   Final    Auto resulted.    Extra Tube 02/29/2024 hold for add-on   Final    Auto resulted    Extra Tube 02/29/2024 Hold for add-ons.   Final    Auto resulted.    Extra Tube 02/29/2024 Hold for add-ons.   Final    Auto resulted    WBC 02/29/2024 10.58  3.40 - 10.80 10*3/mm3 Final    RBC 02/29/2024 5.72  4.14 - 5.80 10*6/mm3 Final    Hemoglobin 02/29/2024 16.9  13.0 - 17.7 g/dL Final    Hematocrit 02/29/2024 48.7  37.5 - 51.0 % Final    MCV 02/29/2024 85.1  79.0 - 97.0 fL Final    MCH 02/29/2024 29.5  26.6 - 33.0 pg Final    MCHC 02/29/2024 34.7  31.5 - 35.7 g/dL Final    RDW 02/29/2024 12.5  12.3 - 15.4 % Final    RDW-SD 02/29/2024 38.3  37.0 - 54.0 fl Final    MPV 02/29/2024 9.1  6.0 - 12.0 fL Final    Platelets 02/29/2024 159  140 - 450 10*3/mm3 Final    Neutrophil % 02/29/2024 70.8  42.7 - 76.0 % Final    Lymphocyte % 02/29/2024 21.8  19.6 - 45.3 % Final    Monocyte % 02/29/2024 5.4  5.0 - 12.0 % Final    Eosinophil % 02/29/2024 1.3  0.3 - 6.2 % Final    Basophil % 02/29/2024 0.4  0.0 - 1.5 % Final    Immature Grans % 02/29/2024 0.3  0.0 - 0.5 % Final    Neutrophils, Absolute 02/29/2024 7.49 (H)  1.70 - 7.00 10*3/mm3 Final    Lymphocytes, Absolute 02/29/2024 2.31  0.70 - 3.10 10*3/mm3 Final    Monocytes, Absolute 02/29/2024 0.57  0.10 - 0.90 10*3/mm3 Final    Eosinophils, Absolute 02/29/2024 0.14  0.00 - 0.40 10*3/mm3 Final    Basophils, Absolute 02/29/2024 0.04  0.00 - 0.20 10*3/mm3 Final    Immature Grans, Absolute 02/29/2024 0.03  0.00 - 0.05 10*3/mm3 Final    nRBC 02/29/2024 0.0  0.0 - 0.2 /100 WBC Final   Admission on 02/28/2024, Discharged on 02/28/2024   Component Date Value Ref Range Status    Glucose 02/28/2024 143 (H)  65 - 99 mg/dL Final    2+ Lipemia      BUN 02/28/2024 17  6 - 20 mg/dL Final    Creatinine 02/28/2024 1.11  0.76 - 1.27 mg/dL Final    Sodium 02/28/2024 138  136 - 145 mmol/L Final    Potassium 02/28/2024 4.1  3.5 - 5.2 mmol/L Final    Slight hemolysis detected by  analyzer. Result may be falsely elevated.    Chloride 02/28/2024 100  98 - 107 mmol/L Final    CO2 02/28/2024 25.0  22.0 - 29.0 mmol/L Final    Calcium 02/28/2024 9.2  8.6 - 10.5 mg/dL Final    Total Protein 02/28/2024 7.6  6.0 - 8.5 g/dL Final    Albumin 02/28/2024 4.5  3.5 - 5.2 g/dL Final    ALT (SGPT) 02/28/2024 33  1 - 41 U/L Final    AST (SGOT) 02/28/2024 23  1 - 40 U/L Final    Alkaline Phosphatase 02/28/2024 88  39 - 117 U/L Final    Total Bilirubin 02/28/2024 0.4  0.0 - 1.2 mg/dL Final    Globulin 02/28/2024 3.1  gm/dL Final    A/G Ratio 02/28/2024 1.5  g/dL Final    BUN/Creatinine Ratio 02/28/2024 15.3  7.0 - 25.0 Final    Anion Gap 02/28/2024 13.0  5.0 - 15.0 mmol/L Final    eGFR 02/28/2024 80.4  >60.0 mL/min/1.73 Final    Lipase 02/28/2024 35  13 - 60 U/L Final    Extra Tube 02/28/2024 Hold for add-ons.   Final    Auto resulted.    Extra Tube 02/28/2024 hold for add-on   Final    Auto resulted    Extra Tube 02/28/2024 Hold for add-ons.   Final    Auto resulted.    Extra Tube 02/28/2024 Hold for add-ons.   Final    Auto resulted    WBC 02/28/2024 8.15  3.40 - 10.80 10*3/mm3 Final    RBC 02/28/2024 6.28 (H)  4.14 - 5.80 10*6/mm3 Final    Hemoglobin 02/28/2024 18.1 (H)  13.0 - 17.7 g/dL Final    Hematocrit 02/28/2024 54.4 (H)  37.5 - 51.0 % Final    MCV 02/28/2024 86.6  79.0 - 97.0 fL Final    MCH 02/28/2024 28.8  26.6 - 33.0 pg Final    MCHC 02/28/2024 33.3  31.5 - 35.7 g/dL Final    RDW 02/28/2024 12.5  12.3 - 15.4 % Final    RDW-SD 02/28/2024 39.5  37.0 - 54.0 fl Final    MPV 02/28/2024 9.5  6.0 - 12.0 fL Final    Platelets 02/28/2024 195  140 - 450 10*3/mm3 Final    Neutrophil % 02/28/2024 58.8  42.7 - 76.0 % Final    Lymphocyte % 02/28/2024 32.9  19.6 - 45.3 % Final    Monocyte % 02/28/2024 6.1  5.0 - 12.0 % Final    Eosinophil % 02/28/2024 1.6  0.3 - 6.2 % Final    Basophil % 02/28/2024 0.2  0.0 - 1.5 % Final    Immature Grans % 02/28/2024 0.4  0.0 - 0.5 % Final    Neutrophils, Absolute 02/28/2024  4.79  1.70 - 7.00 10*3/mm3 Final    Lymphocytes, Absolute 02/28/2024 2.68  0.70 - 3.10 10*3/mm3 Final    Monocytes, Absolute 02/28/2024 0.50  0.10 - 0.90 10*3/mm3 Final    Eosinophils, Absolute 02/28/2024 0.13  0.00 - 0.40 10*3/mm3 Final    Basophils, Absolute 02/28/2024 0.02  0.00 - 0.20 10*3/mm3 Final    Immature Grans, Absolute 02/28/2024 0.03  0.00 - 0.05 10*3/mm3 Final    nRBC 02/28/2024 0.0  0.0 - 0.2 /100 WBC Final    C-Reactive Protein 02/28/2024 <0.30  0.00 - 0.50 mg/dL Final        Procedure: None  Procedures     I have reviewed and agree with the above PMH, PSH, FMH, social history, medications, allergies, and labs.     Assessment/Plan:   Problem List Items Addressed This Visit          Genitourinary and Reproductive     Left ureteral stone - Primary    Relevant Orders    XR abdomen kub (Completed)    Case Request (Completed)    CT Abdomen Pelvis Stone Protocol       Health Maintenance:   Health Maintenance Due   Topic Date Due    Hepatitis B (1 of 3 - 3-dose series) Never done    URINE MICROALBUMIN  Never done    COLORECTAL CANCER SCREENING  Never done    COVID-19 Vaccine (1) Never done    Pneumococcal Vaccine 0-64 (1 of 2 - PCV) Never done    HEPATITIS C SCREENING  Never done    ANNUAL PHYSICAL  Never done    DIABETIC FOOT EXAM  Never done    DIABETIC EYE EXAM  Never done    HEMOGLOBIN A1C  10/23/2020    ZOSTER VACCINE (1 of 2) Never done    INFLUENZA VACCINE  Never done    TDAP/TD VACCINES (3 - Td or Tdap) 08/14/2023        Smoking Counseling:Former smoker.  Current user of smokeless tobacco.  Counseling given however not ready to quit at this time.    Urine Incontinence: Patient reports that he is not currently experiencing any symptoms of urinary incontinence.    Patient was given instructions and counseling regarding his condition or for health maintenance advice. Please see specific information pulled into the AVS if appropriate.    Patient Education:   Left ureteral stone -discussed with the  patient the pathophysiology of this condition in detail.  I did discuss with the patient the use of a left ureteroscopy with holmium laser lithotripsy and possible stent placement.  Patient's stone was not well identified on x-ray today.  Will obtain a stat CT and schedule the patient for a left uteroscopy with holmium laser lithotripsy this morning.  If patient has passed the stone voluntarily surgery can be canceled.  Did discuss the risk and benefits of an operative procedure.  Patient verbalized understanding and agreed to plan of care.  Advised patient to going up to the outpatient surgery center as soon as possible    Visit Diagnoses:    ICD-10-CM ICD-9-CM   1. Left ureteral stone  N20.1 592.1       Meds Ordered During Visit:  No orders of the defined types were placed in this encounter.      Follow Up Appointment: Left U scope this morning  No follow-ups on file.      This document has been electronically signed by Juaquin Orozco PA-C   March 1, 2024 11:03 EST    Part of this note may be an electronic transcription/translation of spoken language to printed text using the Dragon Dictation System.

## 2024-03-01 NOTE — H&P (VIEW-ONLY)
"Chief Complaint:    Chief Complaint   Patient presents with    Kidney Stone       Vital Signs:   /69   Pulse 82   Ht 177.8 cm (70\")   Wt 125 kg (275 lb)   BMI 39.46 kg/m²   Body mass index is 39.46 kg/m².      HPI:  Alek Munoz is a 53 y.o. male who presents today for follow up    History of Present Illness  Mr. Munoz presents to the clinic for follow-up for nephrolithiasis.  He was initially seen in the emergency department on 2/28/2024 secondary to left-sided back and flank pain.  He had a CT scan abdomen pelvis completed at that time that showed a proximal 5 mm mildly obstructing ureteral calculus.  Patient did not wish to undergo surgical intervention at that time and was discharged home with Toradol and Flomax to take once daily.  Presented back to the emergency department late yesterday evening secondary to uncontrolled pain.  He was given IV pain medications and was sent home for reevaluation this morning.  Did have a slight decrease in overall kidney function from a GFR of 80 to roughly 50.  Creatinine also increased as well.  He presents to the clinic and states that pain was controlled with medication but still roughly 8 AM this morning.  He endorses persistent left-sided mid back with radiation to the flank.  He also endorses some intermittent testicular pain as well.  I did complete a KUB that showed a significantly large stool burden but his left ureteral stone cannot be well identified.  Did discuss this case with Dr. Adkins and will obtain a stat CT and schedule him for a right uteroscopy with stent placement this morning.      Past Medical History:  Past Medical History:   Diagnosis Date    Back pain     Cancer     PAROTID CANCER    Degenerative joint disease     Diabetes mellitus     DVT, lower extremity     left 2013    GERD (gastroesophageal reflux disease)     Kidney stones     Neuropathy     FEET     Peroneal DVT (deep venous thrombosis)     in 1998 and dvt 2010    Pulmonary embolism "     Spinal stenosis     Urolithiasis     Vertigo     OCASSIONALLY       Current Meds:  No current facility-administered medications for this visit.     No current outpatient medications on file.        Allergies:   Allergies   Allergen Reactions    Morphine Seizure        Past Surgical History:  Past Surgical History:   Procedure Laterality Date    COLONOSCOPY      CYSTOSCOPY W/ LITHOLAPAXY / EHL      INNER EAR SURGERY      OTHER SURGICAL HISTORY      Mass removed from left side of face    VASECTOMY Bilateral 3/4/2020    Procedure: VASECTOMY;  Surgeon: Angel Adkins MD;  [facility];  Service: Urology;  Laterality: Bilateral;       Social History:  Social History     Socioeconomic History    Marital status:    Tobacco Use    Smoking status: Former     Years: 20     Types: Cigarettes     Quit date:      Years since quittin.1    Smokeless tobacco: Current     Types: Chew    Tobacco comments:     occasionally uses chew once a year    Vaping Use    Vaping Use: Never used   Substance and Sexual Activity    Alcohol use: No     Comment: beer occasionally    Drug use: No    Sexual activity: Defer       Family History:  Family History   Problem Relation Age of Onset    Other Other         deveral siblings havd had early onset DVTs    No Known Problems Mother     No Known Problems Father        Review of Systems:  Review of Systems   Constitutional:  Negative for fatigue, fever and unexpected weight change.   Respiratory:  Negative for chest tightness and shortness of breath.    Cardiovascular:  Negative for chest pain.   Gastrointestinal:  Negative for abdominal pain, constipation, diarrhea, nausea and vomiting.   Genitourinary:  Positive for difficulty urinating, flank pain, frequency, testicular pain and urgency. Negative for dysuria.   Musculoskeletal:  Positive for back pain.   Skin:  Negative for rash.   Psychiatric/Behavioral:  Negative for confusion and suicidal ideas.        Physical  Exam:  Physical Exam  Constitutional:       General: He is not in acute distress.     Appearance: Normal appearance.   HENT:      Head: Normocephalic and atraumatic.      Nose: Nose normal.      Mouth/Throat:      Mouth: Mucous membranes are moist.   Eyes:      Conjunctiva/sclera: Conjunctivae normal.   Cardiovascular:      Rate and Rhythm: Normal rate and regular rhythm.      Pulses: Normal pulses.      Heart sounds: Normal heart sounds.   Pulmonary:      Effort: Pulmonary effort is normal.      Breath sounds: Normal breath sounds.   Abdominal:      General: Bowel sounds are normal.      Palpations: Abdomen is soft.      Tenderness: There is no right CVA tenderness or left CVA tenderness.   Musculoskeletal:         General: Normal range of motion.      Cervical back: Normal range of motion.   Skin:     General: Skin is warm.   Neurological:      General: No focal deficit present.      Mental Status: He is alert and oriented to person, place, and time.   Psychiatric:         Mood and Affect: Mood normal.         Behavior: Behavior normal.         Thought Content: Thought content normal.         Judgment: Judgment normal.         Recent Image (CT and/or KUB):   CT Abdomen and Pelvis: No results found for this or any previous visit.     CT Stone Protocol: Results for orders placed during the hospital encounter of 02/28/24    CT Abdomen Pelvis Stone Protocol    Narrative  PROCEDURE: CT of the abdomen and pelvis performed without IV contrast on  February 28, 2024. The examination was performed with 4 mm axial imaging  and 4 mm sagittal and coronal reconstruction images. The examination was  performed according to as low as reasonably achievable dose protocol.    HISTORY: Left flank pain.    COMPARISON: None.    FINDINGS:    Left lower lobe calcified granuloma.  Small bands of scar versus atelectasis in the left lung base and lower  lingula.  Old fractures involving the lower lateral left ribs.  No pleural effusion or  pneumothorax.  Normal heart size with no pericardial effusion.  No acute process seen in the liver, spleen, gallbladder, pancreas,  adrenal glands, and abdominal aorta.  There is a stone in the proximal left ureter seen best on image 74,  series 2 and this measures 5 mm. Partial left renal obstruction.  Right nephrolithiasis.  Normal appendix is well seen.  Very small umbilical hernia contains only fat.  Mild enlarged prostate gland.  Small right and left inguinal hernias contain only fat.  Mild osteoarthritis at the right and left hip joint.  No abdominal aortic aneurysm or retroperitoneal hemorrhage.  No free fluid or free air.  No abscess.  Mild constipation involving the colon.  No features to suggest cystitis.    Impression  1.  5 mm stone in the proximal left ureter with associated partial left  renal obstruction.  2.  Nonobstructive right nephrolithiasis.  3.  Normal appendix.  4.  Mild constipation throughout the colon.  5.  Mild enlarged prostate gland.  6.  No free fluid or free air  7.  No abscess or hematoma.  8.  Mild degenerative disc disease in the lumbar spine with endplate and  facet hypertrophic changes.    This report was finalized on 2/28/2024 7:41 AM by Arnol Tee MD.     KUB: Results for orders placed in visit on 03/01/24    XR abdomen kub    Narrative  EXAMINATION: XR ABDOMEN KUB-    CLINICAL INDICATION: left proximal ureteral stone; N20.1-Calculus of  ureter      COMPARISON: None available    FINDINGS: 2 views of the abdomen    Large stool burden    No suspicious calcifications seen on the presented study.    Impression  Large stool burden      This report was finalized on 3/1/2024 10:29 AM by Dr. Rush Amin MD.       Labs:  Brief Urine Lab Results  (Last result in the past 365 days)        Color   Clarity   Blood   Leuk Est   Nitrite   Protein   CREAT   Urine HCG        02/29/24 1958 Yellow   Clear   Negative   Negative   Negative   Negative                 Admission on 02/29/2024,  Discharged on 02/29/2024   Component Date Value Ref Range Status    Glucose 02/29/2024 111 (H)  65 - 99 mg/dL Final    BUN 02/29/2024 23 (H)  6 - 20 mg/dL Final    Creatinine 02/29/2024 1.64 (H)  0.76 - 1.27 mg/dL Final    Sodium 02/29/2024 137  136 - 145 mmol/L Final    Potassium 02/29/2024 4.6  3.5 - 5.2 mmol/L Final    Slight hemolysis detected by analyzer. Result may be falsely elevated.    Chloride 02/29/2024 100  98 - 107 mmol/L Final    CO2 02/29/2024 26.6  22.0 - 29.0 mmol/L Final    Calcium 02/29/2024 9.3  8.6 - 10.5 mg/dL Final    Total Protein 02/29/2024 6.8  6.0 - 8.5 g/dL Final    Albumin 02/29/2024 4.2  3.5 - 5.2 g/dL Final    ALT (SGPT) 02/29/2024 33  1 - 41 U/L Final    AST (SGOT) 02/29/2024 22  1 - 40 U/L Final    Alkaline Phosphatase 02/29/2024 72  39 - 117 U/L Final    Total Bilirubin 02/29/2024 0.3  0.0 - 1.2 mg/dL Final    Globulin 02/29/2024 2.6  gm/dL Final    A/G Ratio 02/29/2024 1.6  g/dL Final    BUN/Creatinine Ratio 02/29/2024 14.0  7.0 - 25.0 Final    Anion Gap 02/29/2024 10.4  5.0 - 15.0 mmol/L Final    eGFR 02/29/2024 50.3 (L)  >60.0 mL/min/1.73 Final    Lipase 02/29/2024 48  13 - 60 U/L Final    Lactate 02/29/2024 1.0  0.5 - 2.0 mmol/L Final    Protime 02/29/2024 12.9  12.1 - 14.7 Seconds Final    INR 02/29/2024 0.93  0.90 - 1.10 Final    Color, UA 02/29/2024 Yellow  Yellow, Straw Final    Appearance, UA 02/29/2024 Clear  Clear Final    pH, UA 02/29/2024 <=5.0  5.0 - 8.0 Final    Specific Gravity, UA 02/29/2024 1.025  1.005 - 1.030 Final    Glucose, UA 02/29/2024 Negative  Negative Final    Ketones, UA 02/29/2024 Negative  Negative Final    Bilirubin, UA 02/29/2024 Negative  Negative Final    Blood, UA 02/29/2024 Negative  Negative Final    Protein, UA 02/29/2024 Negative  Negative Final    Leuk Esterase, UA 02/29/2024 Negative  Negative Final    Nitrite, UA 02/29/2024 Negative  Negative Final    Urobilinogen, UA 02/29/2024 0.2 E.U./dL  0.2 - 1.0 E.U./dL Final    Extra Tube 02/29/2024  Hold for add-ons.   Final    Auto resulted.    Extra Tube 02/29/2024 hold for add-on   Final    Auto resulted    Extra Tube 02/29/2024 Hold for add-ons.   Final    Auto resulted.    Extra Tube 02/29/2024 Hold for add-ons.   Final    Auto resulted    WBC 02/29/2024 10.58  3.40 - 10.80 10*3/mm3 Final    RBC 02/29/2024 5.72  4.14 - 5.80 10*6/mm3 Final    Hemoglobin 02/29/2024 16.9  13.0 - 17.7 g/dL Final    Hematocrit 02/29/2024 48.7  37.5 - 51.0 % Final    MCV 02/29/2024 85.1  79.0 - 97.0 fL Final    MCH 02/29/2024 29.5  26.6 - 33.0 pg Final    MCHC 02/29/2024 34.7  31.5 - 35.7 g/dL Final    RDW 02/29/2024 12.5  12.3 - 15.4 % Final    RDW-SD 02/29/2024 38.3  37.0 - 54.0 fl Final    MPV 02/29/2024 9.1  6.0 - 12.0 fL Final    Platelets 02/29/2024 159  140 - 450 10*3/mm3 Final    Neutrophil % 02/29/2024 70.8  42.7 - 76.0 % Final    Lymphocyte % 02/29/2024 21.8  19.6 - 45.3 % Final    Monocyte % 02/29/2024 5.4  5.0 - 12.0 % Final    Eosinophil % 02/29/2024 1.3  0.3 - 6.2 % Final    Basophil % 02/29/2024 0.4  0.0 - 1.5 % Final    Immature Grans % 02/29/2024 0.3  0.0 - 0.5 % Final    Neutrophils, Absolute 02/29/2024 7.49 (H)  1.70 - 7.00 10*3/mm3 Final    Lymphocytes, Absolute 02/29/2024 2.31  0.70 - 3.10 10*3/mm3 Final    Monocytes, Absolute 02/29/2024 0.57  0.10 - 0.90 10*3/mm3 Final    Eosinophils, Absolute 02/29/2024 0.14  0.00 - 0.40 10*3/mm3 Final    Basophils, Absolute 02/29/2024 0.04  0.00 - 0.20 10*3/mm3 Final    Immature Grans, Absolute 02/29/2024 0.03  0.00 - 0.05 10*3/mm3 Final    nRBC 02/29/2024 0.0  0.0 - 0.2 /100 WBC Final   Admission on 02/28/2024, Discharged on 02/28/2024   Component Date Value Ref Range Status    Glucose 02/28/2024 143 (H)  65 - 99 mg/dL Final    2+ Lipemia      BUN 02/28/2024 17  6 - 20 mg/dL Final    Creatinine 02/28/2024 1.11  0.76 - 1.27 mg/dL Final    Sodium 02/28/2024 138  136 - 145 mmol/L Final    Potassium 02/28/2024 4.1  3.5 - 5.2 mmol/L Final    Slight hemolysis detected by  analyzer. Result may be falsely elevated.    Chloride 02/28/2024 100  98 - 107 mmol/L Final    CO2 02/28/2024 25.0  22.0 - 29.0 mmol/L Final    Calcium 02/28/2024 9.2  8.6 - 10.5 mg/dL Final    Total Protein 02/28/2024 7.6  6.0 - 8.5 g/dL Final    Albumin 02/28/2024 4.5  3.5 - 5.2 g/dL Final    ALT (SGPT) 02/28/2024 33  1 - 41 U/L Final    AST (SGOT) 02/28/2024 23  1 - 40 U/L Final    Alkaline Phosphatase 02/28/2024 88  39 - 117 U/L Final    Total Bilirubin 02/28/2024 0.4  0.0 - 1.2 mg/dL Final    Globulin 02/28/2024 3.1  gm/dL Final    A/G Ratio 02/28/2024 1.5  g/dL Final    BUN/Creatinine Ratio 02/28/2024 15.3  7.0 - 25.0 Final    Anion Gap 02/28/2024 13.0  5.0 - 15.0 mmol/L Final    eGFR 02/28/2024 80.4  >60.0 mL/min/1.73 Final    Lipase 02/28/2024 35  13 - 60 U/L Final    Extra Tube 02/28/2024 Hold for add-ons.   Final    Auto resulted.    Extra Tube 02/28/2024 hold for add-on   Final    Auto resulted    Extra Tube 02/28/2024 Hold for add-ons.   Final    Auto resulted.    Extra Tube 02/28/2024 Hold for add-ons.   Final    Auto resulted    WBC 02/28/2024 8.15  3.40 - 10.80 10*3/mm3 Final    RBC 02/28/2024 6.28 (H)  4.14 - 5.80 10*6/mm3 Final    Hemoglobin 02/28/2024 18.1 (H)  13.0 - 17.7 g/dL Final    Hematocrit 02/28/2024 54.4 (H)  37.5 - 51.0 % Final    MCV 02/28/2024 86.6  79.0 - 97.0 fL Final    MCH 02/28/2024 28.8  26.6 - 33.0 pg Final    MCHC 02/28/2024 33.3  31.5 - 35.7 g/dL Final    RDW 02/28/2024 12.5  12.3 - 15.4 % Final    RDW-SD 02/28/2024 39.5  37.0 - 54.0 fl Final    MPV 02/28/2024 9.5  6.0 - 12.0 fL Final    Platelets 02/28/2024 195  140 - 450 10*3/mm3 Final    Neutrophil % 02/28/2024 58.8  42.7 - 76.0 % Final    Lymphocyte % 02/28/2024 32.9  19.6 - 45.3 % Final    Monocyte % 02/28/2024 6.1  5.0 - 12.0 % Final    Eosinophil % 02/28/2024 1.6  0.3 - 6.2 % Final    Basophil % 02/28/2024 0.2  0.0 - 1.5 % Final    Immature Grans % 02/28/2024 0.4  0.0 - 0.5 % Final    Neutrophils, Absolute 02/28/2024  4.79  1.70 - 7.00 10*3/mm3 Final    Lymphocytes, Absolute 02/28/2024 2.68  0.70 - 3.10 10*3/mm3 Final    Monocytes, Absolute 02/28/2024 0.50  0.10 - 0.90 10*3/mm3 Final    Eosinophils, Absolute 02/28/2024 0.13  0.00 - 0.40 10*3/mm3 Final    Basophils, Absolute 02/28/2024 0.02  0.00 - 0.20 10*3/mm3 Final    Immature Grans, Absolute 02/28/2024 0.03  0.00 - 0.05 10*3/mm3 Final    nRBC 02/28/2024 0.0  0.0 - 0.2 /100 WBC Final    C-Reactive Protein 02/28/2024 <0.30  0.00 - 0.50 mg/dL Final        Procedure: None  Procedures     I have reviewed and agree with the above PMH, PSH, FMH, social history, medications, allergies, and labs.     Assessment/Plan:   Problem List Items Addressed This Visit          Genitourinary and Reproductive     Left ureteral stone - Primary    Relevant Orders    XR abdomen kub (Completed)    Case Request (Completed)    CT Abdomen Pelvis Stone Protocol       Health Maintenance:   Health Maintenance Due   Topic Date Due    Hepatitis B (1 of 3 - 3-dose series) Never done    URINE MICROALBUMIN  Never done    COLORECTAL CANCER SCREENING  Never done    COVID-19 Vaccine (1) Never done    Pneumococcal Vaccine 0-64 (1 of 2 - PCV) Never done    HEPATITIS C SCREENING  Never done    ANNUAL PHYSICAL  Never done    DIABETIC FOOT EXAM  Never done    DIABETIC EYE EXAM  Never done    HEMOGLOBIN A1C  10/23/2020    ZOSTER VACCINE (1 of 2) Never done    INFLUENZA VACCINE  Never done    TDAP/TD VACCINES (3 - Td or Tdap) 08/14/2023        Smoking Counseling:Former smoker.  Current user of smokeless tobacco.  Counseling given however not ready to quit at this time.    Urine Incontinence: Patient reports that he is not currently experiencing any symptoms of urinary incontinence.    Patient was given instructions and counseling regarding his condition or for health maintenance advice. Please see specific information pulled into the AVS if appropriate.    Patient Education:   Left ureteral stone -discussed with the  patient the pathophysiology of this condition in detail.  I did discuss with the patient the use of a left ureteroscopy with holmium laser lithotripsy and possible stent placement.  Patient's stone was not well identified on x-ray today.  Will obtain a stat CT and schedule the patient for a left uteroscopy with holmium laser lithotripsy this morning.  If patient has passed the stone voluntarily surgery can be canceled.  Did discuss the risk and benefits of an operative procedure.  Patient verbalized understanding and agreed to plan of care.  Advised patient to going up to the outpatient surgery center as soon as possible    Visit Diagnoses:    ICD-10-CM ICD-9-CM   1. Left ureteral stone  N20.1 592.1       Meds Ordered During Visit:  No orders of the defined types were placed in this encounter.      Follow Up Appointment: Left U scope this morning  No follow-ups on file.      This document has been electronically signed by Juaquin Orozco PA-C   March 1, 2024 11:03 EST    Part of this note may be an electronic transcription/translation of spoken language to printed text using the Dragon Dictation System.

## 2024-03-01 NOTE — ANESTHESIA PROCEDURE NOTES
Airway  Urgency: elective    Date/Time: 3/1/2024 1:33 PM  Airway not difficult    General Information and Staff    Patient location during procedure: OR  CRNA/CAA: Natalie Wiley CRNA    Indications and Patient Condition  Indications for airway management: airway protection    Preoxygenated: yes  Mask difficulty assessment: 0 - not attempted    Final Airway Details  Final airway type: supraglottic airway      Successful airway: unique  Size 4     Number of attempts at approach: 1  Assessment: lips, teeth, and gum same as pre-op

## 2024-03-01 NOTE — ANESTHESIA POSTPROCEDURE EVALUATION
Patient: Alek Munoz    Procedure Summary       Date: 03/01/24 Room / Location: [facility] OR 09 /  [facility] OR    Anesthesia Start: 1329 Anesthesia Stop: 1413    Procedure: URETEROSCOPY LASER LITHOTRIPSY (Left) Diagnosis:       Left ureteral stone      (Left ureteral stone [N20.1])    Surgeons: Angel Adkins MD Provider: Mata Coates MD    Anesthesia Type: general ASA Status: 3            Anesthesia Type: general    Vitals  Vitals Value Taken Time   BP 94/70 03/01/24 1430   Temp 98.7 °F (37.1 °C) 03/01/24 1413   Pulse 77 03/01/24 1434   Resp 16 03/01/24 1428   SpO2 93 % 03/01/24 1434   Vitals shown include unfiled device data.        Post Anesthesia Care and Evaluation    Patient location during evaluation: PHASE II  Patient participation: complete - patient participated  Level of consciousness: awake and alert  Pain score: 0  Pain management: adequate    Airway patency: patent  Anesthetic complications: No anesthetic complications    Cardiovascular status: acceptable  Respiratory status: acceptable  Hydration status: acceptable

## 2024-03-01 NOTE — OP NOTE
Alek Munoz  3/1/2024    Pre-op Diagnosis:   Left ureteral stone [N20.1]    Post-op Diagnosis:     Post-Op Diagnosis Codes:     * Left ureteral stone [N20.1]    Procedure/CPT® Codes:  53-year-old white male with a left proximal ureteral calculus causing severe pain he is desirous of urgent intervention.  Following an informed consent brought the procedure suite the stone was difficult to see on KUB and it was read illuminated with a CT scan I went ahead and put an angiographic Glidewire up in the kidney placed the long ureteral access sheath and used the flexible ureteroscope to identify the stone lying in the lower pole calyx have not been washed and there I broke it and what I thought was several pieces.  It was extremely difficult to manipulate because of the large amount of debris.  I went ahead and very carefully examine it retrograde showed no other filling defects and he was clearly in several pieces because of the amount of debris I chose to put a double-J stent retrograde showed no extravasation.  I put a 5 x 26 double-J stent with an attached lanyard in perfect position visually and fluoroscopically.  He tolerated it well I will leave it until Tuesday and then plan to remove it via the lanyard after I reviewed the KUB    Procedure(s):  URETEROSCOPY LASER LITHOTRIPSY    Surgeon(s):  Angel Adkins MD    Anesthesia: see anesthesia record    Staff:   Circulator: Angela Jeffery RN  Scrub Person: Ugo Jigna Pastor    Estimated Blood Loss: none  Urine Voided: * No values recorded between 3/1/2024  1:28 PM and 3/1/2024  2:02 PM *    Specimens:                None      Drains: 5 x 26 double-J stent with an attached lanyard    Findings: Calcium oxalate dihydrate stone in the renal pelvis    Blood: N/A    Complications: None    Grafts and Implants: None    Angel Adkins MD     Date: 3/1/2024  Time: 14:07 EST

## 2024-03-05 ENCOUNTER — HOSPITAL ENCOUNTER (OUTPATIENT)
Dept: GENERAL RADIOLOGY | Facility: HOSPITAL | Age: 54
Discharge: HOME | End: 2024-03-05
Admitting: UROLOGY
Payer: COMMERCIAL

## 2024-03-05 ENCOUNTER — OFFICE VISIT (OUTPATIENT)
Dept: UROLOGY | Facility: CLINIC | Age: 54
End: 2024-03-05
Payer: COMMERCIAL

## 2024-03-05 VITALS
HEIGHT: 70 IN | BODY MASS INDEX: 38.28 KG/M2 | WEIGHT: 267.4 LBS | SYSTOLIC BLOOD PRESSURE: 133 MMHG | HEART RATE: 70 BPM | DIASTOLIC BLOOD PRESSURE: 74 MMHG

## 2024-03-05 DIAGNOSIS — N20.1 LEFT URETERAL STONE: Primary | ICD-10-CM

## 2024-03-05 DIAGNOSIS — N20.1 LEFT URETERAL STONE: ICD-10-CM

## 2024-03-05 PROCEDURE — 1160F RVW MEDS BY RX/DR IN RCRD: CPT | Performed by: UROLOGY

## 2024-03-05 PROCEDURE — 74018 RADEX ABDOMEN 1 VIEW: CPT | Performed by: RADIOLOGY

## 2024-03-05 PROCEDURE — 1159F MED LIST DOCD IN RCRD: CPT | Performed by: UROLOGY

## 2024-03-05 PROCEDURE — 99213 OFFICE O/P EST LOW 20 MIN: CPT | Performed by: UROLOGY

## 2024-03-05 PROCEDURE — 74018 RADEX ABDOMEN 1 VIEW: CPT

## 2024-03-05 PROCEDURE — 3078F DIAST BP <80 MM HG: CPT | Performed by: UROLOGY

## 2024-03-05 PROCEDURE — 3075F SYST BP GE 130 - 139MM HG: CPT | Performed by: UROLOGY

## 2024-03-05 RX ORDER — KETOROLAC TROMETHAMINE 10 MG/1
10 TABLET, FILM COATED ORAL EVERY 6 HOURS PRN
Qty: 16 TABLET | Refills: 2 | Status: SHIPPED | OUTPATIENT
Start: 2024-03-05

## 2024-03-05 RX ORDER — OXYCODONE AND ACETAMINOPHEN 10; 325 MG/1; MG/1
1 TABLET ORAL EVERY 6 HOURS PRN
Qty: 16 TABLET | Refills: 0 | Status: SHIPPED | OUTPATIENT
Start: 2024-03-05

## 2024-03-05 NOTE — PROGRESS NOTES
Chief Complaint:      Chief Complaint   Patient presents with    Post op Visit       HPI:   53-year-old white male with a left proximal ureteral calculus causing severe pain he is desirous of urgent intervention. Following an informed consent brought the procedure suite the stone was difficult to see on KUB and it was read illuminated with a CT scan I went ahead and put an angiographic Glidewire up in the kidney placed the long ureteral access sheath and used the flexible ureteroscope to identify the stone lying in the lower pole calyx have not been washed and there I broke it and what I thought was several pieces. It was extremely difficult to manipulate because of the large amount of debris. I went ahead and very carefully examine it retrograde showed no other filling defects and he was clearly in several pieces because of the amount of debris I chose to put a double-J stent retrograde showed no extravasation. I put a 5 x 26 double-J stent with an attached lanyard in perfect position visually and fluoroscopically. He tolerated it well I will leave it until Tuesday and then plan to remove it via the lanyard after I reviewed the KUB here for follow-up stents doing well no pain see him back in 3 months with KUB    Past Medical History:     Past Medical History:   Diagnosis Date    Back pain     Cancer     PAROTID CANCER    Degenerative joint disease     Diabetes mellitus     DVT, lower extremity     left 2013    GERD (gastroesophageal reflux disease)     Kidney stones     Neuropathy     FEET     Peroneal DVT (deep venous thrombosis)     in 1998 and dvt 2010    Pulmonary embolism     Spinal stenosis     Urolithiasis     Vertigo     OCASSIONALLY       Current Meds:     Current Outpatient Medications   Medication Sig Dispense Refill    bisoprolol-hydrochlorothiazide (ZIAC) 5-6.25 MG per tablet Take 1 tablet by mouth Daily. as directed      butalbital-acetaminophen-caffeine (FIORICET, ESGIC) -40 MG per tablet        "cyclobenzaprine (FLEXERIL) 10 MG tablet Take 1 tablet by mouth 3 (Three) Times a Day As Needed for Muscle Spasms.      gabapentin (NEURONTIN) 800 MG tablet 4 (Four) Times a Day.      Insulin Glargine (BASAGLAR KWIKPEN SC) Inject 55 Units under the skin into the appropriate area as directed Every Evening.      meclizine (ANTIVERT) 25 MG tablet Take 1 tablet by mouth 3 (Three) Times a Day As Needed for Dizziness. 20 tablet 0    mometasone (Nasonex) 50 MCG/ACT nasal spray 2 sprays into the nostril(s) as directed by provider Daily. 17 g 0    omeprazole (PriLOSEC) 20 MG capsule       ondansetron (ZOFRAN) 4 MG tablet Take 1 tablet by mouth Every 6 (Six) Hours As Needed for Nausea or Vomiting. 15 tablet 0    oxyCODONE-acetaminophen (PERCOCET)  MG per tablet Take 1 tablet by mouth Every 6 (Six) Hours As Needed.      oxyCODONE-acetaminophen (PERCOCET)  MG per tablet Take 1 tablet by mouth Every 4 (Four) Hours As Needed for Moderate Pain  (Pain). 12 tablet 0    oxyCODONE-acetaminophen (Percocet)  MG per tablet Take 1 tablet by mouth Every 6 (Six) Hours As Needed for Moderate Pain. 16 tablet 0    OxyCONTIN 10 MG 12 hr tablet TAKE ONE TABLET BY MOUTH EVERY NIGHT AT BEDTIME AS NEEDED FOR PAIN *DNF 04/18/23*      Ozempic, 1 MG/DOSE, 4 MG/3ML solution pen-injector INJECT 1MG UNDER THE SKIN EVERY WEEK AS DIRECTED      pantoprazole (PROTONIX) 20 MG EC tablet Take 1 tablet by mouth Daily.      phentermine 37.5 MG capsule Take 1 capsule by mouth Every Morning.      Syringe 25G X 5/8\" 3 ML misc Use as directed 2 x weekly 24 each 3    tamsulosin (FLOMAX) 0.4 MG capsule 24 hr capsule TAKE 1 CAPSULE BY MOUTH 30 MINUTES FOLLOWING THE SAME MEAL EACH DAY      tamsulosin (Flomax) 0.4 MG capsule 24 hr capsule Take 1 capsule by mouth Every Night. 30 capsule 5    Testosterone Cypionate (Depo-Testosterone) 200 MG/ML injection Inject 1/2 cc subcutaneously every Monday and Thursday 10 mL 2    Tirzepatide (Mounjaro) 10 MG/0.5ML " solution pen-injector pen Inject 0.5 mL under the skin into the appropriate area as directed 1 (One) Time Per Week.      tiZANidine (ZANAFLEX) 4 MG tablet Take 1 tablet by mouth At Night As Needed for Muscle Spasms.      tiZANidine (ZANAFLEX) 4 MG tablet Take 1 tablet by mouth At Night As Needed for Muscle Spasms. 30 tablet 0     No current facility-administered medications for this visit.        Allergies:      Allergies   Allergen Reactions    Morphine Seizure        Past Surgical History:     Past Surgical History:   Procedure Laterality Date    APPENDECTOMY      COLONOSCOPY      CYSTOSCOPY W/ LITHOLAPAXY / EHL      INNER EAR SURGERY      OTHER SURGICAL HISTORY      Mass removed from left side of face    SKIN BIOPSY      VASECTOMY Bilateral 2020    Procedure: VASECTOMY;  Surgeon: Angel Adkins MD;  Location: [facility];  Service: Urology;  Laterality: Bilateral;       Social History:     Social History     Socioeconomic History    Marital status:    Tobacco Use    Smoking status: Former     Current packs/day: 0.00     Types: Cigarettes     Start date:      Quit date:      Years since quittin.1    Smokeless tobacco: Former     Types: Chew     Quit date:     Tobacco comments:     occasionally uses chew once a year    Vaping Use    Vaping status: Never Used   Substance and Sexual Activity    Alcohol use: No     Comment: beer occasionally    Drug use: No    Sexual activity: Defer       Family History:     Family History   Problem Relation Age of Onset    Other Other         deveral siblings havd had early onset DVTs    No Known Problems Mother     No Known Problems Father        Review of Systems:     Review of Systems   Constitutional: Negative.    HENT: Negative.     Eyes: Negative.    Respiratory: Negative.     Cardiovascular: Negative.    Gastrointestinal: Negative.    Endocrine: Negative.    Musculoskeletal: Negative.    Allergic/Immunologic: Negative.    Neurological:  Negative.    Hematological: Negative.    Psychiatric/Behavioral: Negative.         Physical Exam:     Physical Exam  Vitals and nursing note reviewed.   Constitutional:       Appearance: He is well-developed.   HENT:      Head: Normocephalic and atraumatic.   Eyes:      Conjunctiva/sclera: Conjunctivae normal.      Pupils: Pupils are equal, round, and reactive to light.   Cardiovascular:      Rate and Rhythm: Normal rate and regular rhythm.      Heart sounds: Normal heart sounds.   Pulmonary:      Effort: Pulmonary effort is normal.      Breath sounds: Normal breath sounds.   Abdominal:      General: Bowel sounds are normal.      Palpations: Abdomen is soft.   Musculoskeletal:         General: Normal range of motion.      Cervical back: Normal range of motion.   Skin:     General: Skin is warm and dry.   Neurological:      Mental Status: He is alert and oriented to person, place, and time.      Deep Tendon Reflexes: Reflexes are normal and symmetric.   Psychiatric:         Behavior: Behavior normal.         Thought Content: Thought content normal.         Judgment: Judgment normal.         I have reviewed the following portions of the patient's history: Allergies, current medications, past family history, past medical history, past social history, past surgical history, problem list, and ROS and confirm it is accurate.    Recent Image (CT and/or KUB):      CT Abdomen and Pelvis: No results found for this or any previous visit.       CT Stone Protocol: Results for orders placed during the hospital encounter of 03/01/24    CT Abdomen Pelvis Stone Protocol    Narrative  EXAM:  CT Abdomen and Pelvis Without Intravenous Contrast    EXAM DATE:  3/1/2024 11:11 AM    CLINICAL HISTORY:  Flank pain, kidney stone suspected    TECHNIQUE:  Axial computed tomography images of the abdomen and pelvis without  intravenous contrast.  Sagittal and coronal reformatted images were  created and reviewed.  This CT exam was performed using  one or more of  the following dose reduction techniques:  automated exposure control,  adjustment of the mA and/or kV according to patient size, and/or use of  iterative reconstruction technique.    COMPARISON:  2/28/2024    FINDINGS:  Lung bases:  Centrilobular nodules and fibronodular scarring of the  left lower lobe is stable from previous exam. Lung bases otherwise  clear.  No consolidation.    ABDOMEN:  Liver:  Unremarkable as visualized.  Gallbladder and bile ducts:  Unremarkable as visualized.  No calcified  stones.  No ductal dilation.  Pancreas:  Unremarkable as visualized.  No ductal dilation.  Spleen:  Unremarkable as visualized.  No splenomegaly.  Adrenals:  Unremarkable as visualized.  No mass.  Kidneys and ureters:  Essentially stable degree of moderate left  hydronephrosis secondary to 5 mm left UPJ stone which has not  significantly changed in location.  Left perinephric stranding is  stable.  Punctate nonobstructing right kidney stone noted.  Stomach and bowel:  Unremarkable as visualized.  No obstruction.  No  mucosal thickening.    PELVIS:  Appendix:  Normal appendix.  Bladder:  Incomplete distention of urinary bladder.  No stones.  Reproductive:  Unremarkable as visualized.    ABDOMEN and PELVIS:  Intraperitoneal space:  Unremarkable as visualized.  No significant  fluid collection.  No pneumoperitoneum identified.  Bones/joints:  Degenerative changes lumbar spine are stable with  variable stenosis at multiple levels.  Old left rib fracture noted.  No  dislocation.  Soft tissues:  Unremarkable as visualized.  Vasculature:  Unremarkable as visualized.  No abdominal aortic  aneurysm.  Lymph nodes:  Unremarkable as visualized.  No enlarged lymph nodes.    Impression  1.  Essentially stable exam demonstrating moderate obstructive uropathy  left side secondary to 5 mm left UPJ stone. No significant change in  location.  2.  Additional nonobstructing right kidney stone. No right  hydronephrosis.  3.   Other incidental and nonacute findings described above are stable  from previous.      This report was finalized on 3/1/2024 11:47 AM by Dr. Roddy Lopez MD.       KUB: Results for orders placed in visit on 03/01/24    XR abdomen kub    Narrative  EXAMINATION: XR ABDOMEN KUB-    CLINICAL INDICATION: left proximal ureteral stone; N20.1-Calculus of  ureter      COMPARISON: None available    FINDINGS: 2 views of the abdomen    Large stool burden    No suspicious calcifications seen on the presented study.    Impression  Large stool burden      This report was finalized on 3/1/2024 10:29 AM by Dr. Rush Amin MD.       Labs (past 3 months):      Admission on 03/01/2024, Discharged on 03/01/2024   Component Date Value Ref Range Status    Glucose 03/01/2024 99  70 - 130 mg/dL Final   Admission on 02/29/2024, Discharged on 02/29/2024   Component Date Value Ref Range Status    Glucose 02/29/2024 111 (H)  65 - 99 mg/dL Final    BUN 02/29/2024 23 (H)  6 - 20 mg/dL Final    Creatinine 02/29/2024 1.64 (H)  0.76 - 1.27 mg/dL Final    Sodium 02/29/2024 137  136 - 145 mmol/L Final    Potassium 02/29/2024 4.6  3.5 - 5.2 mmol/L Final    Slight hemolysis detected by analyzer. Result may be falsely elevated.    Chloride 02/29/2024 100  98 - 107 mmol/L Final    CO2 02/29/2024 26.6  22.0 - 29.0 mmol/L Final    Calcium 02/29/2024 9.3  8.6 - 10.5 mg/dL Final    Total Protein 02/29/2024 6.8  6.0 - 8.5 g/dL Final    Albumin 02/29/2024 4.2  3.5 - 5.2 g/dL Final    ALT (SGPT) 02/29/2024 33  1 - 41 U/L Final    AST (SGOT) 02/29/2024 22  1 - 40 U/L Final    Alkaline Phosphatase 02/29/2024 72  39 - 117 U/L Final    Total Bilirubin 02/29/2024 0.3  0.0 - 1.2 mg/dL Final    Globulin 02/29/2024 2.6  gm/dL Final    A/G Ratio 02/29/2024 1.6  g/dL Final    BUN/Creatinine Ratio 02/29/2024 14.0  7.0 - 25.0 Final    Anion Gap 02/29/2024 10.4  5.0 - 15.0 mmol/L Final    eGFR 02/29/2024 50.3 (L)  >60.0 mL/min/1.73 Final    Lipase 02/29/2024 48  13 - 60  U/L Final    Lactate 02/29/2024 1.0  0.5 - 2.0 mmol/L Final    Protime 02/29/2024 12.9  12.1 - 14.7 Seconds Final    INR 02/29/2024 0.93  0.90 - 1.10 Final    Color, UA 02/29/2024 Yellow  Yellow, Straw Final    Appearance, UA 02/29/2024 Clear  Clear Final    pH, UA 02/29/2024 <=5.0  5.0 - 8.0 Final    Specific Gravity, UA 02/29/2024 1.025  1.005 - 1.030 Final    Glucose, UA 02/29/2024 Negative  Negative Final    Ketones, UA 02/29/2024 Negative  Negative Final    Bilirubin, UA 02/29/2024 Negative  Negative Final    Blood, UA 02/29/2024 Negative  Negative Final    Protein, UA 02/29/2024 Negative  Negative Final    Leuk Esterase, UA 02/29/2024 Negative  Negative Final    Nitrite, UA 02/29/2024 Negative  Negative Final    Urobilinogen, UA 02/29/2024 0.2 E.U./dL  0.2 - 1.0 E.U./dL Final    Extra Tube 02/29/2024 Hold for add-ons.   Final    Auto resulted.    Extra Tube 02/29/2024 hold for add-on   Final    Auto resulted    Extra Tube 02/29/2024 Hold for add-ons.   Final    Auto resulted.    Extra Tube 02/29/2024 Hold for add-ons.   Final    Auto resulted    WBC 02/29/2024 10.58  3.40 - 10.80 10*3/mm3 Final    RBC 02/29/2024 5.72  4.14 - 5.80 10*6/mm3 Final    Hemoglobin 02/29/2024 16.9  13.0 - 17.7 g/dL Final    Hematocrit 02/29/2024 48.7  37.5 - 51.0 % Final    MCV 02/29/2024 85.1  79.0 - 97.0 fL Final    MCH 02/29/2024 29.5  26.6 - 33.0 pg Final    MCHC 02/29/2024 34.7  31.5 - 35.7 g/dL Final    RDW 02/29/2024 12.5  12.3 - 15.4 % Final    RDW-SD 02/29/2024 38.3  37.0 - 54.0 fl Final    MPV 02/29/2024 9.1  6.0 - 12.0 fL Final    Platelets 02/29/2024 159  140 - 450 10*3/mm3 Final    Neutrophil % 02/29/2024 70.8  42.7 - 76.0 % Final    Lymphocyte % 02/29/2024 21.8  19.6 - 45.3 % Final    Monocyte % 02/29/2024 5.4  5.0 - 12.0 % Final    Eosinophil % 02/29/2024 1.3  0.3 - 6.2 % Final    Basophil % 02/29/2024 0.4  0.0 - 1.5 % Final    Immature Grans % 02/29/2024 0.3  0.0 - 0.5 % Final    Neutrophils, Absolute 02/29/2024 7.49  (H)  1.70 - 7.00 10*3/mm3 Final    Lymphocytes, Absolute 02/29/2024 2.31  0.70 - 3.10 10*3/mm3 Final    Monocytes, Absolute 02/29/2024 0.57  0.10 - 0.90 10*3/mm3 Final    Eosinophils, Absolute 02/29/2024 0.14  0.00 - 0.40 10*3/mm3 Final    Basophils, Absolute 02/29/2024 0.04  0.00 - 0.20 10*3/mm3 Final    Immature Grans, Absolute 02/29/2024 0.03  0.00 - 0.05 10*3/mm3 Final    nRBC 02/29/2024 0.0  0.0 - 0.2 /100 WBC Final   Admission on 02/28/2024, Discharged on 02/28/2024   Component Date Value Ref Range Status    Glucose 02/28/2024 143 (H)  65 - 99 mg/dL Final    2+ Lipemia      BUN 02/28/2024 17  6 - 20 mg/dL Final    Creatinine 02/28/2024 1.11  0.76 - 1.27 mg/dL Final    Sodium 02/28/2024 138  136 - 145 mmol/L Final    Potassium 02/28/2024 4.1  3.5 - 5.2 mmol/L Final    Slight hemolysis detected by analyzer. Result may be falsely elevated.    Chloride 02/28/2024 100  98 - 107 mmol/L Final    CO2 02/28/2024 25.0  22.0 - 29.0 mmol/L Final    Calcium 02/28/2024 9.2  8.6 - 10.5 mg/dL Final    Total Protein 02/28/2024 7.6  6.0 - 8.5 g/dL Final    Albumin 02/28/2024 4.5  3.5 - 5.2 g/dL Final    ALT (SGPT) 02/28/2024 33  1 - 41 U/L Final    AST (SGOT) 02/28/2024 23  1 - 40 U/L Final    Alkaline Phosphatase 02/28/2024 88  39 - 117 U/L Final    Total Bilirubin 02/28/2024 0.4  0.0 - 1.2 mg/dL Final    Globulin 02/28/2024 3.1  gm/dL Final    A/G Ratio 02/28/2024 1.5  g/dL Final    BUN/Creatinine Ratio 02/28/2024 15.3  7.0 - 25.0 Final    Anion Gap 02/28/2024 13.0  5.0 - 15.0 mmol/L Final    eGFR 02/28/2024 80.4  >60.0 mL/min/1.73 Final    Lipase 02/28/2024 35  13 - 60 U/L Final    Extra Tube 02/28/2024 Hold for add-ons.   Final    Auto resulted.    Extra Tube 02/28/2024 hold for add-on   Final    Auto resulted    Extra Tube 02/28/2024 Hold for add-ons.   Final    Auto resulted.    Extra Tube 02/28/2024 Hold for add-ons.   Final    Auto resulted    WBC 02/28/2024 8.15  3.40 - 10.80 10*3/mm3 Final    RBC 02/28/2024 6.28 (H)   4.14 - 5.80 10*6/mm3 Final    Hemoglobin 02/28/2024 18.1 (H)  13.0 - 17.7 g/dL Final    Hematocrit 02/28/2024 54.4 (H)  37.5 - 51.0 % Final    MCV 02/28/2024 86.6  79.0 - 97.0 fL Final    MCH 02/28/2024 28.8  26.6 - 33.0 pg Final    MCHC 02/28/2024 33.3  31.5 - 35.7 g/dL Final    RDW 02/28/2024 12.5  12.3 - 15.4 % Final    RDW-SD 02/28/2024 39.5  37.0 - 54.0 fl Final    MPV 02/28/2024 9.5  6.0 - 12.0 fL Final    Platelets 02/28/2024 195  140 - 450 10*3/mm3 Final    Neutrophil % 02/28/2024 58.8  42.7 - 76.0 % Final    Lymphocyte % 02/28/2024 32.9  19.6 - 45.3 % Final    Monocyte % 02/28/2024 6.1  5.0 - 12.0 % Final    Eosinophil % 02/28/2024 1.6  0.3 - 6.2 % Final    Basophil % 02/28/2024 0.2  0.0 - 1.5 % Final    Immature Grans % 02/28/2024 0.4  0.0 - 0.5 % Final    Neutrophils, Absolute 02/28/2024 4.79  1.70 - 7.00 10*3/mm3 Final    Lymphocytes, Absolute 02/28/2024 2.68  0.70 - 3.10 10*3/mm3 Final    Monocytes, Absolute 02/28/2024 0.50  0.10 - 0.90 10*3/mm3 Final    Eosinophils, Absolute 02/28/2024 0.13  0.00 - 0.40 10*3/mm3 Final    Basophils, Absolute 02/28/2024 0.02  0.00 - 0.20 10*3/mm3 Final    Immature Grans, Absolute 02/28/2024 0.03  0.00 - 0.05 10*3/mm3 Final    nRBC 02/28/2024 0.0  0.0 - 0.2 /100 WBC Final    C-Reactive Protein 02/28/2024 <0.30  0.00 - 0.50 mg/dL Final        Procedure:       Assessment/Plan:   Ureteral calculus-patient has been diagnosed with a ureteral calculus.  We have discussed the various parameters regarding spontaneous passage including the notion that a 2 mm stone has a high likelihood of spontaneous passage versus a larger stone being caught up in the upper areas of the urinary tract.  We also discussed the medical management of stone disease and the use of medical expulsive therapy in the form of Flomax.  This is used in an off label setting.  We discussed the indicators for intervention including  absolute indicators such as sepsis and uncontrollable severe pain, as well as the  relative indicators of moderate pain that is well-controlled with various analgesia.  I also talked about nonoperative management including ambulation and increasing fluids and hot tub as being an effective adjuncts in the treatment of a ureteral stone.  Status post successful ureteroscopy the stent has been removed            This document has been electronically signed by JOSE A SILVEIRA MD March 5, 2024 08:33 EST    Dictated Utilizing Dragon Dictation: Part of this note may be an electronic transcription/translation of spoken language to printed text using the Dragon Dictation System.

## 2024-04-15 ENCOUNTER — HOSPITAL ENCOUNTER (EMERGENCY)
Facility: HOSPITAL | Age: 54
Discharge: HOME | End: 2024-04-15
Attending: EMERGENCY MEDICINE | Admitting: STUDENT IN AN ORGANIZED HEALTH CARE EDUCATION/TRAINING PROGRAM
Payer: COMMERCIAL

## 2024-04-15 ENCOUNTER — APPOINTMENT (OUTPATIENT)
Dept: CT IMAGING | Facility: HOSPITAL | Age: 54
End: 2024-04-15
Payer: COMMERCIAL

## 2024-04-15 VITALS
HEIGHT: 62 IN | BODY MASS INDEX: 49.69 KG/M2 | TEMPERATURE: 98 F | RESPIRATION RATE: 18 BRPM | HEART RATE: 69 BPM | WEIGHT: 270 LBS | DIASTOLIC BLOOD PRESSURE: 86 MMHG | SYSTOLIC BLOOD PRESSURE: 128 MMHG | OXYGEN SATURATION: 95 %

## 2024-04-15 DIAGNOSIS — R10.9 FLANK PAIN: Primary | ICD-10-CM

## 2024-04-15 LAB
ALBUMIN SERPL-MCNC: 4.1 G/DL (ref 3.5–5.2)
ALBUMIN/GLOB SERPL: 1.3 G/DL
ALP SERPL-CCNC: 76 U/L (ref 39–117)
ALT SERPL W P-5'-P-CCNC: 36 U/L (ref 1–41)
AMPHET+METHAMPHET UR QL: NEGATIVE
AMPHETAMINES UR QL: NEGATIVE
ANION GAP SERPL CALCULATED.3IONS-SCNC: 10.5 MMOL/L (ref 5–15)
AST SERPL-CCNC: 26 U/L (ref 1–40)
BARBITURATES UR QL SCN: POSITIVE
BASOPHILS # BLD AUTO: 0.03 10*3/MM3 (ref 0–0.2)
BASOPHILS NFR BLD AUTO: 0.4 % (ref 0–1.5)
BENZODIAZ UR QL SCN: NEGATIVE
BILIRUB SERPL-MCNC: 0.4 MG/DL (ref 0–1.2)
BILIRUB UR QL STRIP: NEGATIVE
BUN SERPL-MCNC: 18 MG/DL (ref 6–20)
BUN/CREAT SERPL: 14.4 (ref 7–25)
BUPRENORPHINE SERPL-MCNC: NEGATIVE NG/ML
CALCIUM SPEC-SCNC: 10 MG/DL (ref 8.6–10.5)
CANNABINOIDS SERPL QL: NEGATIVE
CHLORIDE SERPL-SCNC: 100 MMOL/L (ref 98–107)
CLARITY UR: CLEAR
CO2 SERPL-SCNC: 26.5 MMOL/L (ref 22–29)
COCAINE UR QL: NEGATIVE
COLOR UR: YELLOW
CREAT SERPL-MCNC: 1.25 MG/DL (ref 0.76–1.27)
D-LACTATE SERPL-SCNC: 1.7 MMOL/L (ref 0.5–2)
DEPRECATED RDW RBC AUTO: 41.1 FL (ref 37–54)
EGFRCR SERPLBLD CKD-EPI 2021: 69.7 ML/MIN/1.73
EOSINOPHIL # BLD AUTO: 0.12 10*3/MM3 (ref 0–0.4)
EOSINOPHIL NFR BLD AUTO: 1.6 % (ref 0.3–6.2)
ERYTHROCYTE [DISTWIDTH] IN BLOOD BY AUTOMATED COUNT: 13.2 % (ref 12.3–15.4)
FENTANYL UR-MCNC: NEGATIVE NG/ML
GLOBULIN UR ELPH-MCNC: 3.1 GM/DL
GLUCOSE SERPL-MCNC: 129 MG/DL (ref 65–99)
GLUCOSE UR STRIP-MCNC: NEGATIVE MG/DL
HCT VFR BLD AUTO: 46.4 % (ref 37.5–51)
HGB BLD-MCNC: 15.8 G/DL (ref 13–17.7)
HGB UR QL STRIP.AUTO: NEGATIVE
HOLD SPECIMEN: NORMAL
HOLD SPECIMEN: NORMAL
IMM GRANULOCYTES # BLD AUTO: 0.02 10*3/MM3 (ref 0–0.05)
IMM GRANULOCYTES NFR BLD AUTO: 0.3 % (ref 0–0.5)
KETONES UR QL STRIP: NEGATIVE
LEUKOCYTE ESTERASE UR QL STRIP.AUTO: NEGATIVE
LIPASE SERPL-CCNC: 22 U/L (ref 13–60)
LYMPHOCYTES # BLD AUTO: 2.32 10*3/MM3 (ref 0.7–3.1)
LYMPHOCYTES NFR BLD AUTO: 30.8 % (ref 19.6–45.3)
MCH RBC QN AUTO: 29.2 PG (ref 26.6–33)
MCHC RBC AUTO-ENTMCNC: 34.1 G/DL (ref 31.5–35.7)
MCV RBC AUTO: 85.8 FL (ref 79–97)
METHADONE UR QL SCN: NEGATIVE
MONOCYTES # BLD AUTO: 0.46 10*3/MM3 (ref 0.1–0.9)
MONOCYTES NFR BLD AUTO: 6.1 % (ref 5–12)
NEUTROPHILS NFR BLD AUTO: 4.59 10*3/MM3 (ref 1.7–7)
NEUTROPHILS NFR BLD AUTO: 60.8 % (ref 42.7–76)
NITRITE UR QL STRIP: NEGATIVE
NRBC BLD AUTO-RTO: 0 /100 WBC (ref 0–0.2)
OPIATES UR QL: NEGATIVE
OXYCODONE UR QL SCN: POSITIVE
PCP UR QL SCN: NEGATIVE
PH UR STRIP.AUTO: 5.5 [PH] (ref 5–8)
PLATELET # BLD AUTO: 188 10*3/MM3 (ref 140–450)
PMV BLD AUTO: 9.1 FL (ref 6–12)
POTASSIUM SERPL-SCNC: 4 MMOL/L (ref 3.5–5.2)
PROT SERPL-MCNC: 7.2 G/DL (ref 6–8.5)
PROT UR QL STRIP: NEGATIVE
RBC # BLD AUTO: 5.41 10*6/MM3 (ref 4.14–5.8)
SODIUM SERPL-SCNC: 137 MMOL/L (ref 136–145)
SP GR UR STRIP: 1.02 (ref 1–1.03)
TRICYCLICS UR QL SCN: NEGATIVE
UROBILINOGEN UR QL STRIP: NORMAL
WBC NRBC COR # BLD AUTO: 7.54 10*3/MM3 (ref 3.4–10.8)
WHOLE BLOOD HOLD COAG: NORMAL
WHOLE BLOOD HOLD SPECIMEN: NORMAL

## 2024-04-15 PROCEDURE — 25010000002 ONDANSETRON PER 1 MG: Performed by: EMERGENCY MEDICINE

## 2024-04-15 PROCEDURE — 83605 ASSAY OF LACTIC ACID: CPT | Performed by: EMERGENCY MEDICINE

## 2024-04-15 PROCEDURE — 25810000003 SODIUM CHLORIDE 0.9 % SOLUTION: Performed by: EMERGENCY MEDICINE

## 2024-04-15 PROCEDURE — 96361 HYDRATE IV INFUSION ADD-ON: CPT

## 2024-04-15 PROCEDURE — 99284 EMERGENCY DEPT VISIT MOD MDM: CPT

## 2024-04-15 PROCEDURE — 83690 ASSAY OF LIPASE: CPT | Performed by: EMERGENCY MEDICINE

## 2024-04-15 PROCEDURE — 80307 DRUG TEST PRSMV CHEM ANLYZR: CPT | Performed by: EMERGENCY MEDICINE

## 2024-04-15 PROCEDURE — 85025 COMPLETE CBC W/AUTO DIFF WBC: CPT | Performed by: EMERGENCY MEDICINE

## 2024-04-15 PROCEDURE — 80053 COMPREHEN METABOLIC PANEL: CPT | Performed by: EMERGENCY MEDICINE

## 2024-04-15 PROCEDURE — 96374 THER/PROPH/DIAG INJ IV PUSH: CPT

## 2024-04-15 PROCEDURE — 96375 TX/PRO/DX INJ NEW DRUG ADDON: CPT

## 2024-04-15 PROCEDURE — 74176 CT ABD & PELVIS W/O CONTRAST: CPT | Performed by: RADIOLOGY

## 2024-04-15 PROCEDURE — 25010000002 KETOROLAC TROMETHAMINE PER 15 MG: Performed by: EMERGENCY MEDICINE

## 2024-04-15 PROCEDURE — 74176 CT ABD & PELVIS W/O CONTRAST: CPT

## 2024-04-15 PROCEDURE — 36415 COLL VENOUS BLD VENIPUNCTURE: CPT

## 2024-04-15 PROCEDURE — 81003 URINALYSIS AUTO W/O SCOPE: CPT | Performed by: EMERGENCY MEDICINE

## 2024-04-15 RX ORDER — ONDANSETRON 2 MG/ML
4 INJECTION INTRAMUSCULAR; INTRAVENOUS ONCE
Status: COMPLETED | OUTPATIENT
Start: 2024-04-15 | End: 2024-04-15

## 2024-04-15 RX ORDER — SODIUM CHLORIDE 0.9 % (FLUSH) 0.9 %
10 SYRINGE (ML) INJECTION AS NEEDED
Status: DISCONTINUED | OUTPATIENT
Start: 2024-04-15 | End: 2024-04-15 | Stop reason: HOSPADM

## 2024-04-15 RX ORDER — SODIUM CHLORIDE 9 MG/ML
125 INJECTION, SOLUTION INTRAVENOUS CONTINUOUS
Status: DISCONTINUED | OUTPATIENT
Start: 2024-04-15 | End: 2024-04-15 | Stop reason: HOSPADM

## 2024-04-15 RX ORDER — KETOROLAC TROMETHAMINE 30 MG/ML
30 INJECTION, SOLUTION INTRAMUSCULAR; INTRAVENOUS ONCE
Status: COMPLETED | OUTPATIENT
Start: 2024-04-15 | End: 2024-04-15

## 2024-04-15 RX ADMIN — ONDANSETRON 4 MG: 2 INJECTION INTRAMUSCULAR; INTRAVENOUS at 03:35

## 2024-04-15 RX ADMIN — SODIUM CHLORIDE 500 ML: 9 INJECTION, SOLUTION INTRAVENOUS at 03:35

## 2024-04-15 RX ADMIN — SODIUM CHLORIDE 125 ML/HR: 9 INJECTION, SOLUTION INTRAVENOUS at 03:36

## 2024-04-15 RX ADMIN — KETOROLAC TROMETHAMINE 30 MG: 30 INJECTION, SOLUTION INTRAMUSCULAR at 03:35

## 2024-04-15 NOTE — ED NOTES
"Asked patient about providing urine sample at this time, patient states \"no I still can't, I think I need some fluids.\" Educated patient on fluids been provided previously and importance of providing urine specimen, understanding verbalized.  "

## 2024-04-15 NOTE — ED PROVIDER NOTES
Subjective   History of Present Illness  Patient is a 53-year-old male who reports a history of kidney stones, presents complaining of the onset tonight of left flank pain, feeling just like kidney stones.  No fevers, chills, chest pain, shortness of breath, vomiting, dysuria, hematuria, other symptoms or other complaints.      Review of Systems   All other systems reviewed and are negative.      Past Medical History:   Diagnosis Date    Back pain     Cancer     PAROTID CANCER    Degenerative joint disease     Diabetes mellitus     DVT, lower extremity     left     GERD (gastroesophageal reflux disease)     Kidney stones     Neuropathy     FEET     Peroneal DVT (deep venous thrombosis)     in  and dvt     Pulmonary embolism     Spinal stenosis     Urolithiasis     Vertigo     OCASSIONALLY       Allergies   Allergen Reactions    Morphine Seizure       Past Surgical History:   Procedure Laterality Date    APPENDECTOMY      COLONOSCOPY      CYSTOSCOPY W/ LITHOLAPAXY / EHL      INNER EAR SURGERY      OTHER SURGICAL HISTORY      Mass removed from left side of face    SKIN BIOPSY      URETEROSCOPY LASER LITHOTRIPSY WITH STENT INSERTION Left 3/1/2024    Procedure: URETEROSCOPY LASER LITHOTRIPSY;  Surgeon: Angel Adkins MD;  Location: [facility];  Service: Urology;  Laterality: Left;    VASECTOMY Bilateral 2020    Procedure: VASECTOMY;  Surgeon: Angel Adkins MD;  Location: [facility];  Service: Urology;  Laterality: Bilateral;       Family History   Problem Relation Age of Onset    Other Other         deveral siblings havd had early onset DVTs    No Known Problems Mother     No Known Problems Father        Social History     Socioeconomic History    Marital status:    Tobacco Use    Smoking status: Former     Current packs/day: 0.00     Types: Cigarettes     Start date:      Quit date:      Years since quittin.3    Smokeless tobacco: Former     Types: Chew     Quit  date: 2019    Tobacco comments:     occasionally uses chew once a year    Vaping Use    Vaping status: Never Used   Substance and Sexual Activity    Alcohol use: No     Comment: beer occasionally    Drug use: No    Sexual activity: Defer           Objective   Physical Exam  Vitals and nursing note reviewed.   Constitutional:       General: He is not in acute distress.     Appearance: Normal appearance. He is well-developed. He is not ill-appearing, toxic-appearing or diaphoretic.   HENT:      Head: Normocephalic and atraumatic.   Eyes:      General: No scleral icterus.     Pupils: Pupils are equal, round, and reactive to light.   Neck:      Trachea: No tracheal deviation.   Cardiovascular:      Rate and Rhythm: Normal rate and regular rhythm.   Pulmonary:      Effort: Pulmonary effort is normal. No respiratory distress.      Breath sounds: Normal breath sounds.   Chest:      Chest wall: No tenderness.   Abdominal:      General: Bowel sounds are normal.      Palpations: Abdomen is soft.      Tenderness: There is abdominal tenderness (Mild tenderness in the left flank, no other tenderness.  No acute peritoneal signs.). There is no right CVA tenderness, left CVA tenderness, guarding or rebound.   Musculoskeletal:         General: No tenderness. Normal range of motion.      Cervical back: Normal range of motion and neck supple. No rigidity or tenderness.      Right lower leg: No edema.      Left lower leg: No edema.   Skin:     General: Skin is warm and dry.      Capillary Refill: Capillary refill takes less than 2 seconds.      Coloration: Skin is not pale.   Neurological:      General: No focal deficit present.      Mental Status: He is alert and oriented to person, place, and time.      GCS: GCS eye subscore is 4. GCS verbal subscore is 5. GCS motor subscore is 6.      Motor: No abnormal muscle tone.   Psychiatric:         Mood and Affect: Mood normal.         Behavior: Behavior normal.         Procedures            ED Course  ED Course as of 04/15/24 0756   Mon Apr 15, 2024   0616 Patient still has not provided us with a urine specimen, he is aware that we need one. [CM]   0617 CT with no acute findings, no ureteral stones per radiologist interpretation. [CM]   0708 Case discussed with and care endorsed to Dr. Sarmiento at shift change, pending urine studies.  Electronically signed by Stephen Camacho MD, 04/15/24, 7:08 AM EDT.   [CM]      ED Course User Index  [CM] Stephen Camacho MD      CT Abdomen Pelvis Stone Protocol    Result Date: 4/15/2024   1.  Slightly enlarged prostate gland. 2.  Nonobstructive right nephrolithiasis versus artifact. 3.  Nonobstructive left nephrolithiasis with less than 1 mm calcification at the lower pole of the left kidney. 4.  Normal appendix is well seen. 5.  No bowel or renal obstruction. 6.  No free fluid or free air. 7.  No abscess or hematoma. 8.  Very small nonspecific mesenteric nodes. 9.  Very small umbilical hernia contains only fat. 10.  No acute process seen in the right and left flank areas.  This report was finalized on 4/15/2024 5:32 AM by Arnol Tee MD.       Results for orders placed or performed during the hospital encounter of 04/15/24   Comprehensive Metabolic Panel    Specimen: Arm, Left; Blood   Result Value Ref Range    Glucose 129 (H) 65 - 99 mg/dL    BUN 18 6 - 20 mg/dL    Creatinine 1.25 0.76 - 1.27 mg/dL    Sodium 137 136 - 145 mmol/L    Potassium 4.0 3.5 - 5.2 mmol/L    Chloride 100 98 - 107 mmol/L    CO2 26.5 22.0 - 29.0 mmol/L    Calcium 10.0 8.6 - 10.5 mg/dL    Total Protein 7.2 6.0 - 8.5 g/dL    Albumin 4.1 3.5 - 5.2 g/dL    ALT (SGPT) 36 1 - 41 U/L    AST (SGOT) 26 1 - 40 U/L    Alkaline Phosphatase 76 39 - 117 U/L    Total Bilirubin 0.4 0.0 - 1.2 mg/dL    Globulin 3.1 gm/dL    A/G Ratio 1.3 g/dL    BUN/Creatinine Ratio 14.4 7.0 - 25.0    Anion Gap 10.5 5.0 - 15.0 mmol/L    eGFR 69.7 >60.0 mL/min/1.73   Lipase    Specimen: Arm, Left; Blood   Result  Value Ref Range    Lipase 22 13 - 60 U/L   Lactic Acid, Plasma    Specimen: Arm, Left; Blood   Result Value Ref Range    Lactate 1.7 0.5 - 2.0 mmol/L   Urinalysis With Culture If Indicated - Urine, Clean Catch    Specimen: Urine, Clean Catch   Result Value Ref Range    Color, UA Yellow Yellow, Straw    Appearance, UA Clear Clear    pH, UA 5.5 5.0 - 8.0    Specific Gravity, UA 1.017 1.005 - 1.030    Glucose, UA Negative Negative    Ketones, UA Negative Negative    Bilirubin, UA Negative Negative    Blood, UA Negative Negative    Protein, UA Negative Negative    Leuk Esterase, UA Negative Negative    Nitrite, UA Negative Negative    Urobilinogen, UA 0.2 E.U./dL 0.2 - 1.0 E.U./dL   CBC Auto Differential    Specimen: Arm, Left; Blood   Result Value Ref Range    WBC 7.54 3.40 - 10.80 10*3/mm3    RBC 5.41 4.14 - 5.80 10*6/mm3    Hemoglobin 15.8 13.0 - 17.7 g/dL    Hematocrit 46.4 37.5 - 51.0 %    MCV 85.8 79.0 - 97.0 fL    MCH 29.2 26.6 - 33.0 pg    MCHC 34.1 31.5 - 35.7 g/dL    RDW 13.2 12.3 - 15.4 %    RDW-SD 41.1 37.0 - 54.0 fl    MPV 9.1 6.0 - 12.0 fL    Platelets 188 140 - 450 10*3/mm3    Neutrophil % 60.8 42.7 - 76.0 %    Lymphocyte % 30.8 19.6 - 45.3 %    Monocyte % 6.1 5.0 - 12.0 %    Eosinophil % 1.6 0.3 - 6.2 %    Basophil % 0.4 0.0 - 1.5 %    Immature Grans % 0.3 0.0 - 0.5 %    Neutrophils, Absolute 4.59 1.70 - 7.00 10*3/mm3    Lymphocytes, Absolute 2.32 0.70 - 3.10 10*3/mm3    Monocytes, Absolute 0.46 0.10 - 0.90 10*3/mm3    Eosinophils, Absolute 0.12 0.00 - 0.40 10*3/mm3    Basophils, Absolute 0.03 0.00 - 0.20 10*3/mm3    Immature Grans, Absolute 0.02 0.00 - 0.05 10*3/mm3    nRBC 0.0 0.0 - 0.2 /100 WBC   Urine Drug Screen - Urine, Clean Catch    Specimen: Urine, Clean Catch   Result Value Ref Range    THC, Screen, Urine Negative Negative    Phencyclidine (PCP), Urine Negative Negative    Cocaine Screen, Urine Negative Negative    Methamphetamine, Ur Negative Negative    Opiate Screen Negative Negative     Amphetamine Screen, Urine Negative Negative    Benzodiazepine Screen, Urine Negative Negative    Tricyclic Antidepressants Screen Negative Negative    Methadone Screen, Urine Negative Negative    Barbiturates Screen, Urine Positive (A) Negative    Oxycodone Screen, Urine Positive (A) Negative    Buprenorphine, Screen, Urine Negative Negative   Green Top (Gel)   Result Value Ref Range    Extra Tube Hold for add-ons.    Lavender Top   Result Value Ref Range    Extra Tube hold for add-on    Gold Top - SST   Result Value Ref Range    Extra Tube Hold for add-ons.    Light Blue Top   Result Value Ref Range    Extra Tube Hold for add-ons.                                             Medical Decision Making  I assumed care of this patient at shift change from Dr. Camacho.  Please see original note for HPI details and physical exam findings.  Laboratory workup listed.  CT imaging of the abdomen pelvis reveals no acute abnormality.  No obvious signs of fever or chills.  No elevated white blood cell count.  Work up and results were discussed throughly with the patient.  The patient will be discharged for further monitoring and management with their PCP.  Red flags, warning signs, worsening symptoms, and when to return to the ER discussed with and understood by the patient.  Patient will follow up with their PCP in a timely manner.  Vitals stable at discharge.    Problems Addressed:  Flank pain: complicated acute illness or injury    Amount and/or Complexity of Data Reviewed  Labs: ordered.    Risk  Prescription drug management.        Final diagnoses:   Flank pain       ED Disposition  ED Disposition       None            Arnol Torres MD  [address]  [address] [address]  [phone]    In 1 week      [facility] EMERGENCY DEPARTMENT  [address] [place] [id number] [phone]    If symptoms worsen         Medication List      No changes were made to your prescriptions during this  visit.            Jamar Sarmiento,   04/15/24 0734       Jamar Sarmiento,   04/15/24 0739       Jamar Sarmiento,   04/15/24 075

## 2024-06-04 ENCOUNTER — HOSPITAL ENCOUNTER (EMERGENCY)
Facility: HOSPITAL | Age: 54
Discharge: HOME | End: 2024-06-05
Attending: STUDENT IN AN ORGANIZED HEALTH CARE EDUCATION/TRAINING PROGRAM | Admitting: STUDENT IN AN ORGANIZED HEALTH CARE EDUCATION/TRAINING PROGRAM
Payer: COMMERCIAL

## 2024-06-04 ENCOUNTER — OFFICE VISIT (OUTPATIENT)
Dept: UROLOGY | Facility: CLINIC | Age: 54
End: 2024-06-04
Payer: COMMERCIAL

## 2024-06-04 VITALS
HEART RATE: 54 BPM | DIASTOLIC BLOOD PRESSURE: 81 MMHG | SYSTOLIC BLOOD PRESSURE: 129 MMHG | BODY MASS INDEX: 50.13 KG/M2 | HEIGHT: 62 IN | WEIGHT: 272.4 LBS

## 2024-06-04 VITALS
RESPIRATION RATE: 20 BRPM | HEIGHT: 62 IN | DIASTOLIC BLOOD PRESSURE: 81 MMHG | BODY MASS INDEX: 49.69 KG/M2 | TEMPERATURE: 97.7 F | WEIGHT: 270 LBS | HEART RATE: 62 BPM | OXYGEN SATURATION: 98 % | SYSTOLIC BLOOD PRESSURE: 152 MMHG

## 2024-06-04 DIAGNOSIS — N20.1 LEFT URETERAL STONE: Primary | ICD-10-CM

## 2024-06-04 DIAGNOSIS — K02.9 DENTAL CARIES INTO PULP: Primary | ICD-10-CM

## 2024-06-04 PROCEDURE — 1159F MED LIST DOCD IN RCRD: CPT | Performed by: UROLOGY

## 2024-06-04 PROCEDURE — 1160F RVW MEDS BY RX/DR IN RCRD: CPT | Performed by: UROLOGY

## 2024-06-04 PROCEDURE — 3074F SYST BP LT 130 MM HG: CPT | Performed by: UROLOGY

## 2024-06-04 PROCEDURE — 3079F DIAST BP 80-89 MM HG: CPT | Performed by: UROLOGY

## 2024-06-04 PROCEDURE — 99282 EMERGENCY DEPT VISIT SF MDM: CPT

## 2024-06-04 PROCEDURE — 99213 OFFICE O/P EST LOW 20 MIN: CPT | Performed by: UROLOGY

## 2024-06-04 RX ORDER — CLINDAMYCIN HYDROCHLORIDE 150 MG/1
300 CAPSULE ORAL ONCE
Status: DISCONTINUED | OUTPATIENT
Start: 2024-06-05 | End: 2024-06-05

## 2024-06-04 RX ORDER — PENICILLIN V POTASSIUM 500 MG/1
250 TABLET ORAL 3 TIMES DAILY
Qty: 30 TABLET | Refills: 0 | Status: SHIPPED | OUTPATIENT
Start: 2024-06-04

## 2024-06-04 NOTE — PROGRESS NOTES
Chief Complaint:      Chief Complaint   Patient presents with    Left ureteral stone       HPI:   53 y.o. male status post a successful ureteroscopy his major complaint now is he cannot ejaculate I would have him stop the alpha blockade I will see him back in 1 year.    Past Medical History:     Past Medical History:   Diagnosis Date    Back pain     Cancer     PAROTID CANCER    Degenerative joint disease     Diabetes mellitus     DVT, lower extremity     left 2013    GERD (gastroesophageal reflux disease)     Kidney stones     Neuropathy     FEET     Peroneal DVT (deep venous thrombosis)     in 1998 and dvt 2010    Pulmonary embolism     Spinal stenosis     Urolithiasis     Vertigo     OCASSIONALLY       Current Meds:     Current Outpatient Medications   Medication Sig Dispense Refill    bisoprolol-hydrochlorothiazide (ZIAC) 5-6.25 MG per tablet Take 1 tablet by mouth Daily. as directed      butalbital-acetaminophen-caffeine (FIORICET, ESGIC) -40 MG per tablet       cyclobenzaprine (FLEXERIL) 10 MG tablet Take 1 tablet by mouth 3 (Three) Times a Day As Needed for Muscle Spasms.      gabapentin (NEURONTIN) 800 MG tablet 4 (Four) Times a Day.      Insulin Glargine (BASAGLAR KWIKPEN SC) Inject 55 Units under the skin into the appropriate area as directed Every Evening.      ketorolac (TORADOL) 10 MG tablet Take 1 tablet by mouth Every 6 (Six) Hours As Needed for Moderate Pain. 16 tablet 2    meclizine (ANTIVERT) 25 MG tablet Take 1 tablet by mouth 3 (Three) Times a Day As Needed for Dizziness. 20 tablet 0    mometasone (Nasonex) 50 MCG/ACT nasal spray 2 sprays into the nostril(s) as directed by provider Daily. 17 g 0    omeprazole (PriLOSEC) 20 MG capsule       ondansetron (ZOFRAN) 4 MG tablet Take 1 tablet by mouth Every 6 (Six) Hours As Needed for Nausea or Vomiting. 15 tablet 0    oxyCODONE-acetaminophen (PERCOCET)  MG per tablet Take 1 tablet by mouth Every 6 (Six) Hours As Needed.       "oxyCODONE-acetaminophen (PERCOCET)  MG per tablet Take 1 tablet by mouth Every 4 (Four) Hours As Needed for Moderate Pain  (Pain). 12 tablet 0    oxyCODONE-acetaminophen (Percocet)  MG per tablet Take 1 tablet by mouth Every 6 (Six) Hours As Needed for Moderate Pain. 16 tablet 0    OxyCONTIN 10 MG 12 hr tablet TAKE ONE TABLET BY MOUTH EVERY NIGHT AT BEDTIME AS NEEDED FOR PAIN *DNF 04/18/23*      Ozempic, 1 MG/DOSE, 4 MG/3ML solution pen-injector INJECT 1MG UNDER THE SKIN EVERY WEEK AS DIRECTED      pantoprazole (PROTONIX) 20 MG EC tablet Take 1 tablet by mouth Daily.      phentermine 37.5 MG capsule Take 1 capsule by mouth Every Morning.      Syringe 25G X 5/8\" 3 ML misc Use as directed 2 x weekly 24 each 3    tamsulosin (FLOMAX) 0.4 MG capsule 24 hr capsule TAKE 1 CAPSULE BY MOUTH 30 MINUTES FOLLOWING THE SAME MEAL EACH DAY      tamsulosin (Flomax) 0.4 MG capsule 24 hr capsule Take 1 capsule by mouth Every Night. 30 capsule 5    Testosterone Cypionate (Depo-Testosterone) 200 MG/ML injection Inject 1/2 cc subcutaneously every Monday and Thursday 10 mL 2    Tirzepatide (Mounjaro) 10 MG/0.5ML solution pen-injector pen Inject 0.5 mL under the skin into the appropriate area as directed 1 (One) Time Per Week.      tiZANidine (ZANAFLEX) 4 MG tablet Take 1 tablet by mouth At Night As Needed for Muscle Spasms.      tiZANidine (ZANAFLEX) 4 MG tablet Take 1 tablet by mouth At Night As Needed for Muscle Spasms. 30 tablet 0     No current facility-administered medications for this visit.        Allergies:      Allergies   Allergen Reactions    Morphine Seizure        Past Surgical History:     Past Surgical History:   Procedure Laterality Date    APPENDECTOMY      COLONOSCOPY      CYSTOSCOPY W/ LITHOLAPAXY / EHL      INNER EAR SURGERY      OTHER SURGICAL HISTORY      Mass removed from left side of face    SKIN BIOPSY      URETEROSCOPY LASER LITHOTRIPSY WITH STENT INSERTION Left 3/1/2024    Procedure: URETEROSCOPY " LASER LITHOTRIPSY;  Surgeon: Angel Adkins MD;  Location: [facility];  Service: Urology;  Laterality: Left;    VASECTOMY Bilateral 2020    Procedure: VASECTOMY;  Surgeon: Angel Adkins MD;  Location: [facility];  Service: Urology;  Laterality: Bilateral;       Social History:     Social History     Socioeconomic History    Marital status:    Tobacco Use    Smoking status: Former     Current packs/day: 0.00     Types: Cigarettes     Start date:      Quit date:      Years since quittin.4    Smokeless tobacco: Former     Types: Chew     Quit date:     Tobacco comments:     occasionally uses chew once a year    Vaping Use    Vaping status: Never Used   Substance and Sexual Activity    Alcohol use: No     Comment: beer occasionally    Drug use: No    Sexual activity: Defer       Family History:     Family History   Problem Relation Age of Onset    Other Other         deveral siblings havd had early onset DVTs    No Known Problems Mother     No Known Problems Father        Review of Systems:     Review of Systems   Constitutional: Negative.    HENT: Negative.     Eyes: Negative.    Respiratory: Negative.     Cardiovascular: Negative.    Gastrointestinal: Negative.    Endocrine: Negative.    Musculoskeletal: Negative.    Allergic/Immunologic: Negative.    Neurological: Negative.    Hematological: Negative.    Psychiatric/Behavioral: Negative.         Physical Exam:     Physical Exam  Vitals and nursing note reviewed.   Constitutional:       Appearance: He is well-developed.   HENT:      Head: Normocephalic and atraumatic.   Eyes:      Conjunctiva/sclera: Conjunctivae normal.      Pupils: Pupils are equal, round, and reactive to light.   Cardiovascular:      Rate and Rhythm: Normal rate and regular rhythm.      Heart sounds: Normal heart sounds.   Pulmonary:      Effort: Pulmonary effort is normal.      Breath sounds: Normal breath sounds.   Abdominal:      General: Bowel  sounds are normal.      Palpations: Abdomen is soft.   Musculoskeletal:         General: Normal range of motion.      Cervical back: Normal range of motion.   Skin:     General: Skin is warm and dry.   Neurological:      Mental Status: He is alert and oriented to person, place, and time.      Deep Tendon Reflexes: Reflexes are normal and symmetric.   Psychiatric:         Behavior: Behavior normal.         Thought Content: Thought content normal.         Judgment: Judgment normal.         I have reviewed the following portions of the patient's history: Allergies, current medications, past family history, past medical history, past social history, past surgical history, problem list, and ROS and confirm it is accurate.    Recent Image (CT and/or KUB):      CT Abdomen and Pelvis: No results found for this or any previous visit.       CT Stone Protocol: Results for orders placed during the hospital encounter of 04/15/24    CT Abdomen Pelvis Stone Protocol    Narrative  PROCEDURE: CT of the abdomen and pelvis performed without IV contrast on  April 15, 2024. The examination was performed with 4 mm axial imaging  and 4 mm sagittal and coronal reconstruction images. Total DLP = 845.  The examination was performed according to as low as reasonably  achievable dose protocol.    HISTORY: Flank pain. Possible renal stone.    COMPARISON: None.    FINDINGS:    Normal heart size.  No acute process seen in the liver, spleen, gallbladder, pancreas,  adrenal glands, and kidneys.  Nonobstructive less than 1 mm calcification suggested at the midpole of  the right kidney as seen on image 50, series 2 versus artifact.  Nonobstructive left nephrolithiasis with 1 mm calcification at the lower  pole of the left kidney seen best on image 60, series 2.  Normal appendix is well seen.  Left lower lobe calcified granuloma.  No hydronephrosis or perinephric stranding.  Slightly enlarged prostate gland.  Left lower pelvic phleboliths.  No acute  process seen in the bladder.  Very small umbilical hernia contains only fat.  No bowel or renal obstruction.  No free fluid or free air.  No abscess or hematoma.    Impression  1.  Slightly enlarged prostate gland.  2.  Nonobstructive right nephrolithiasis versus artifact.  3.  Nonobstructive left nephrolithiasis with less than 1 mm  calcification at the lower pole of the left kidney.  4.  Normal appendix is well seen.  5.  No bowel or renal obstruction.  6.  No free fluid or free air.  7.  No abscess or hematoma.  8.  Very small nonspecific mesenteric nodes.  9.  Very small umbilical hernia contains only fat.  10.  No acute process seen in the right and left flank areas.    This report was finalized on 4/15/2024 5:32 AM by Arnol Tee MD.       KUB: Results for orders placed during the hospital encounter of 03/05/24    XR Abdomen KUB    Narrative  PROCEDURE: XR ABDOMEN KUB-    HISTORY: FLANK PAIN; N20.1-Calculus of ureter    COMPARISON: 3/1/2024.    FINDINGS: An AP view of the abdomen and pelvis demonstrates an  unremarkable bowel gas pattern with no evidence of obstruction. No  radiopaque stones are seen overlying the renal shadows. A left ureteral  stent is in place and appears appropriately positioned.    Impression  Left ureteral stent in place which appears appropriately  positioned.          This report was finalized on 3/5/2024 9:02 AM by Joselo Mccormack M.D..       Labs (past 3 months):      Admission on 04/15/2024, Discharged on 04/15/2024   Component Date Value Ref Range Status    Glucose 04/15/2024 129 (H)  65 - 99 mg/dL Final    BUN 04/15/2024 18  6 - 20 mg/dL Final    Creatinine 04/15/2024 1.25  0.76 - 1.27 mg/dL Final    Sodium 04/15/2024 137  136 - 145 mmol/L Final    Potassium 04/15/2024 4.0  3.5 - 5.2 mmol/L Final    Slight hemolysis detected by analyzer. Result may be falsely elevated.    Chloride 04/15/2024 100  98 - 107 mmol/L Final    CO2 04/15/2024 26.5  22.0 - 29.0 mmol/L Final     Calcium 04/15/2024 10.0  8.6 - 10.5 mg/dL Final    Total Protein 04/15/2024 7.2  6.0 - 8.5 g/dL Final    Albumin 04/15/2024 4.1  3.5 - 5.2 g/dL Final    ALT (SGPT) 04/15/2024 36  1 - 41 U/L Final    AST (SGOT) 04/15/2024 26  1 - 40 U/L Final    Alkaline Phosphatase 04/15/2024 76  39 - 117 U/L Final    Total Bilirubin 04/15/2024 0.4  0.0 - 1.2 mg/dL Final    Globulin 04/15/2024 3.1  gm/dL Final    A/G Ratio 04/15/2024 1.3  g/dL Final    BUN/Creatinine Ratio 04/15/2024 14.4  7.0 - 25.0 Final    Anion Gap 04/15/2024 10.5  5.0 - 15.0 mmol/L Final    eGFR 04/15/2024 69.7  >60.0 mL/min/1.73 Final    Lipase 04/15/2024 22  13 - 60 U/L Final    Lactate 04/15/2024 1.7  0.5 - 2.0 mmol/L Final    Color, UA 04/15/2024 Yellow  Yellow, Straw Final    Appearance, UA 04/15/2024 Clear  Clear Final    pH, UA 04/15/2024 5.5  5.0 - 8.0 Final    Specific Gravity, UA 04/15/2024 1.017  1.005 - 1.030 Final    Glucose, UA 04/15/2024 Negative  Negative Final    Ketones, UA 04/15/2024 Negative  Negative Final    Bilirubin, UA 04/15/2024 Negative  Negative Final    Blood, UA 04/15/2024 Negative  Negative Final    Protein, UA 04/15/2024 Negative  Negative Final    Leuk Esterase, UA 04/15/2024 Negative  Negative Final    Nitrite, UA 04/15/2024 Negative  Negative Final    Urobilinogen, UA 04/15/2024 0.2 E.U./dL  0.2 - 1.0 E.U./dL Final    Extra Tube 04/15/2024 Hold for add-ons.   Final    Auto resulted.    Extra Tube 04/15/2024 hold for add-on   Final    Auto resulted    Extra Tube 04/15/2024 Hold for add-ons.   Final    Auto resulted.    Extra Tube 04/15/2024 Hold for add-ons.   Final    Auto resulted    WBC 04/15/2024 7.54  3.40 - 10.80 10*3/mm3 Final    RBC 04/15/2024 5.41  4.14 - 5.80 10*6/mm3 Final    Hemoglobin 04/15/2024 15.8  13.0 - 17.7 g/dL Final    Hematocrit 04/15/2024 46.4  37.5 - 51.0 % Final    MCV 04/15/2024 85.8  79.0 - 97.0 fL Final    MCH 04/15/2024 29.2  26.6 - 33.0 pg Final    MCHC 04/15/2024 34.1  31.5 - 35.7 g/dL Final     RDW 04/15/2024 13.2  12.3 - 15.4 % Final    RDW-SD 04/15/2024 41.1  37.0 - 54.0 fl Final    MPV 04/15/2024 9.1  6.0 - 12.0 fL Final    Platelets 04/15/2024 188  140 - 450 10*3/mm3 Final    Neutrophil % 04/15/2024 60.8  42.7 - 76.0 % Final    Lymphocyte % 04/15/2024 30.8  19.6 - 45.3 % Final    Monocyte % 04/15/2024 6.1  5.0 - 12.0 % Final    Eosinophil % 04/15/2024 1.6  0.3 - 6.2 % Final    Basophil % 04/15/2024 0.4  0.0 - 1.5 % Final    Immature Grans % 04/15/2024 0.3  0.0 - 0.5 % Final    Neutrophils, Absolute 04/15/2024 4.59  1.70 - 7.00 10*3/mm3 Final    Lymphocytes, Absolute 04/15/2024 2.32  0.70 - 3.10 10*3/mm3 Final    Monocytes, Absolute 04/15/2024 0.46  0.10 - 0.90 10*3/mm3 Final    Eosinophils, Absolute 04/15/2024 0.12  0.00 - 0.40 10*3/mm3 Final    Basophils, Absolute 04/15/2024 0.03  0.00 - 0.20 10*3/mm3 Final    Immature Grans, Absolute 04/15/2024 0.02  0.00 - 0.05 10*3/mm3 Final    nRBC 04/15/2024 0.0  0.0 - 0.2 /100 WBC Final    THC, Screen, Urine 04/15/2024 Negative  Negative Final    Phencyclidine (PCP), Urine 04/15/2024 Negative  Negative Final    Cocaine Screen, Urine 04/15/2024 Negative  Negative Final    Methamphetamine, Ur 04/15/2024 Negative  Negative Final    Opiate Screen 04/15/2024 Negative  Negative Final    Amphetamine Screen, Urine 04/15/2024 Negative  Negative Final    Benzodiazepine Screen, Urine 04/15/2024 Negative  Negative Final    Tricyclic Antidepressants Screen 04/15/2024 Negative  Negative Final    Methadone Screen, Urine 04/15/2024 Negative  Negative Final    Barbiturates Screen, Urine 04/15/2024 Positive (A)  Negative Final    Oxycodone Screen, Urine 04/15/2024 Positive (A)  Negative Final    Buprenorphine, Screen, Urine 04/15/2024 Negative  Negative Final    Fentanyl, Urine 04/15/2024 Negative  Negative Final        Procedure:       Assessment/Plan:   Ureteral calculus-patient has been diagnosed with a ureteral calculus.  We have discussed the various parameters regarding  spontaneous passage including the notion that a 2 mm stone has a high likelihood of spontaneous passage versus a larger stone being caught up in the upper areas of the urinary tract.  We also discussed the medical management of stone disease and the use of medical expulsive therapy in the form of Flomax.  This is used in an off label setting.  We discussed the indicators for intervention including  absolute indicators such as sepsis and uncontrollable severe pain, as well as the relative indicators of moderate pain that is well-controlled with various analgesia.  I also talked about nonoperative management including ambulation and increasing fluids and hot tub as being an effective adjuncts in the treatment of a ureteral stone.  Status post successful ureteroscopy  Anejaculation secondary to alpha blockade stop Flomax see back in 1 year            This document has been electronically signed by JOSE A SILVEIRA MD June 4, 2024 13:18 EDT    Dictated Utilizing Dragon Dictation: Part of this note may be an electronic transcription/translation of spoken language to printed text using the Dragon Dictation System.

## 2024-06-05 RX ORDER — CLINDAMYCIN HYDROCHLORIDE 300 MG/1
300 CAPSULE ORAL 4 TIMES DAILY
Qty: 40 CAPSULE | Refills: 0 | Status: SHIPPED | OUTPATIENT
Start: 2024-06-05 | End: 2024-06-15

## 2024-06-05 NOTE — ED NOTES
MEDICAL SCREENING:    Reason for Visit: dental abscess with swelling     Patient initially seen in triage.  The patient was advised further evaluation and diagnostic testing will be needed, some of the treatment and testing will be initiated in the lobby in order to begin the process.  The patient will be returned to the waiting area for the time being and possibly be re-assessed by a subsequent ED provider.  The patient will be brought back to the treatment area in as timely manner as possible.      Aracelis Freeman PA  06/04/24 [id number]

## 2024-06-05 NOTE — ED PROVIDER NOTES
Subjective   History of Present Illness  53-year-old male presents with complaints of known dental caries having seen and evaluated by several dentist in the past few months and awaiting dental extraction.  Patient states that he had some remainder old clindamycin has been taken 1 capsule a day for 2 to 3 days but ran out of the prescription.  He reports mostly pain localized to the left lower posterior molars.  Patient has been utilizing topical Orajel that has provided intermittent relief but continues to complain of dental pain.    Yogi shows that patient is on a significant amount of chronic pain medication including Percocet 10 mg #150 for 30-day supply, gabapentin 800 mg #120 for 30-day supply and OxyContin 10 mg #30 for 30-day supply.      Review of Systems   HENT:  Positive for dental problem.        Past Medical History:   Diagnosis Date    Back pain     Cancer     PAROTID CANCER    Degenerative joint disease     Diabetes mellitus     DVT, lower extremity     left 2013    GERD (gastroesophageal reflux disease)     Kidney stones     Neuropathy     FEET     Peroneal DVT (deep venous thrombosis)     in 1998 and dvt 2010    Pulmonary embolism     Spinal stenosis     Urolithiasis     Vertigo     OCASSIONALLY       Allergies   Allergen Reactions    Morphine Seizure       Past Surgical History:   Procedure Laterality Date    APPENDECTOMY      COLONOSCOPY      CYSTOSCOPY W/ LITHOLAPAXY / EHL      INNER EAR SURGERY      OTHER SURGICAL HISTORY      Mass removed from left side of face    SKIN BIOPSY      URETEROSCOPY LASER LITHOTRIPSY WITH STENT INSERTION Left 3/1/2024    Procedure: URETEROSCOPY LASER LITHOTRIPSY;  Surgeon: Angel Adkins MD;  Location: [facility];  Service: Urology;  Laterality: Left;    VASECTOMY Bilateral 03/04/2020    Procedure: VASECTOMY;  Surgeon: Angel Adkins MD;  Location: [facility];  Service: Urology;  Laterality: Bilateral;       Family History   Problem Relation Age of  Onset    Other Other         deveral siblings havd had early onset DVTs    No Known Problems Mother     No Known Problems Father        Social History     Socioeconomic History    Marital status:    Tobacco Use    Smoking status: Former     Current packs/day: 0.00     Types: Cigarettes     Start date:      Quit date:      Years since quittin.4    Smokeless tobacco: Former     Types: Chew     Quit date:     Tobacco comments:     occasionally uses chew once a year    Vaping Use    Vaping status: Never Used   Substance and Sexual Activity    Alcohol use: No     Comment: beer occasionally    Drug use: No    Sexual activity: Defer           Objective   Physical Exam  Vitals and nursing note reviewed.   Constitutional:       General: He is not in acute distress.     Appearance: He is well-developed. He is not diaphoretic.   HENT:      Head: Normocephalic and atraumatic.      Right Ear: External ear normal.      Left Ear: External ear normal.      Nose: Nose normal.      Mouth/Throat:      Mouth: Mucous membranes are moist.      Comments: Very poor dentition- several teeth with visible decay and root exposure-most prominent on the left lower jaw posterior molars with visible pulp exposure on the lateral wall   Gumline appears healthy with no visible erythema or induration or fluctuant palpable mass.  Eyes:      Conjunctiva/sclera: Conjunctivae normal.      Pupils: Pupils are equal, round, and reactive to light.   Neck:      Vascular: No JVD.      Trachea: No tracheal deviation.   Cardiovascular:      Rate and Rhythm: Normal rate and regular rhythm.      Heart sounds: Normal heart sounds. No murmur heard.  Pulmonary:      Effort: Pulmonary effort is normal. No respiratory distress.      Breath sounds: Normal breath sounds. No wheezing.   Abdominal:      General: Bowel sounds are normal.      Palpations: Abdomen is soft.      Tenderness: There is no abdominal tenderness.   Musculoskeletal:          General: No deformity. Normal range of motion.      Cervical back: Normal range of motion and neck supple.   Skin:     General: Skin is warm and dry.      Coloration: Skin is not pale.      Findings: No erythema or rash.   Neurological:      General: No focal deficit present.      Mental Status: He is alert and oriented to person, place, and time.      Cranial Nerves: No cranial nerve deficit.   Psychiatric:         Behavior: Behavior normal.         Thought Content: Thought content normal.         Procedures           ED Course  ED Course as of 06/05/24 0017 Wed Jun 05, 2024   0015 antibiotics as well as intervals have been ordered while pt in ER; however patient was no longer willing to wait for medication or pharmacy to verify medication.  He requested that the order be discontinued.  Antibiotics were sent to the pharmacy    -Pt is previsouly on OxyContin and Percocet at home- he was counseled to use Motrin or Tylenol additionally if needed and he was previously prescribed viscous lidocaine to use. [LK]      ED Course User Index  [LK] Fartun Penny DO                                             Medical Decision Making  Problems Addressed:  Dental caries into pulp: complicated acute illness or injury    Risk  Prescription drug management.        Final diagnoses:   Dental caries into pulp       ED Disposition  ED Disposition       ED Disposition   Discharge    Condition   Stable    Comment   --               Arnol Torres MD  [address]    [facility] [address] [phone]               Medication List        New Prescriptions      clindamycin 300 MG capsule  Commonly known as: CLEOCIN  Take 1 capsule by mouth 4 (Four) Times a Day for 10 days. Dental carries with root exposure               Where to Get Your Medications        These medications were sent to [place], [place] - [address] - [phone] [facility] [phone] FX  [address], [facility] [address]       Phone: [phone]   clindamycin 300 MG capsule            Fartun Penny,   06/05/24 0016       Fartun Penny,   06/05/24 0017

## 2024-08-21 ENCOUNTER — NURSE TRIAGE (OUTPATIENT)
Dept: CALL CENTER | Facility: HOSPITAL | Age: 54
End: 2024-08-21
Payer: COMMERCIAL

## 2024-08-22 NOTE — TELEPHONE ENCOUNTER
Caller states dad has been exposed to COVID as she has it and now having chills. Caller states headache, body aches and chills but denies any chest pain or pressure. Caller denies any odd breathing noises or difficulty breathing. Caller states he is taking fluids well. Caller states his shivering is better some now and they will give tylenol. Caller states no Thermometer and was advised to obtain one and get reading. Caller educated on what to monitor for and when to seek emergent care. Caller advised should become worse of have any of the complaints discussed seek emergent care. Caller advised to call back as needed. Caller will check in with PCP in the morning regarding testing and possible office visit.           Reason for Disposition   [1] Caller has NON-URGENT question AND [2] triager unable to answer    Additional Information   Negative: SEVERE difficulty breathing (e.g., struggling for each breath, speaks in single words)   Negative: [1] SEVERE weakness (e.g., can't stand or can barely walk) AND [2] new-onset or WORSE   Negative: Difficult to awaken or acting confused (e.g., disoriented, slurred speech)   Negative: Bluish (or gray) lips or face now   Negative: Sounds like a life-threatening emergency to the triager   Negative: [1] Typical COVID-19 symptoms AND [2] lasting less than 3 weeks   Negative: [1] Chest pain, pressure, or tightness AND [2] new-onset or worsening   Negative: [1] Fever AND [2] new-onset or worsening   Negative: [1] MODERATE difficulty breathing (e.g., speaks in phrases, SOB even at rest, pulse 100-120) AND [2] new-onset or WORSE   Negative: [1] MODERATE difficulty breathing AND [2] oxygen level (e.g., pulse oximetry) 91 to 94 percent   Negative: Oxygen level (e.g., pulse oximetry) 90 percent or lower   Negative: MODERATE difficulty breathing (e.g., speaks in phrases, SOB even at rest, pulse 100-120)   Negative: [1] Drinking very little AND [2] dehydration suspected (e.g., no urine > 12  "hours, very dry mouth, very lightheaded)   Negative: Patient sounds very sick or weak to the triager   Negative: [1] MILD difficulty breathing (e.g., minimal/no SOB at rest, SOB with walking, pulse <100) AND [2] new-onset   Negative: [1] PERSISTING SYMPTOMS OF COVID-19 AND [2] NEW symptom AND [3] could be serious   Negative: Oxygen level (e.g., pulse oximetry) 91 to 94 percent   Negative: [1] Caller has URGENT question AND [2] triager unable to answer question   Negative: [1] PERSISTING SYMPTOMS OF COVID-19 AND [2] symptoms WORSE    Answer Assessment - Initial Assessment Questions  1. COVID-19 ONSET: \"When did the symptoms of COVID-19 first start?\"      Yesterday   2. DIAGNOSIS CONFIRMATION: \"How were you diagnosed?\" (e.g., COVID-19 oral or nasal viral test; COVID-19 antibody test; doctor visit)        3. MAIN SYMPTOM:  \"What is your main concern or symptom right now?\" (e.g., breathing difficulty, cough, fatigue. loss of smell)      Fever   4. SYMPTOM ONSET: \"When did the  symptoms  start?\"      Yesterday   5. BETTER-SAME-WORSE: \"Are you getting better, staying the same, or getting worse over the last 1 to 2 weeks?\"       Worse tonight   6. RECENT MEDICAL VISIT: \"Have you been seen by a healthcare provider (doctor, NP, PA) for these persisting COVID-19 symptoms?\" If Yes, ask: \"When were you seen?\" (e.g., date)        7. COUGH: \"Do you have a cough?\" If Yes, ask: \"How bad is the cough?\"        Denies   8. FEVER: \"Do you have a fever?\" If Yes, ask: \"What is your temperature, how was it measured, and when did it start?\"      Yes   9. BREATHING DIFFICULTY: \"Are you having any trouble breathing?\" If Yes, ask: \"How bad is your breathing?\" (e.g., mild, moderate, severe)     - MILD: No SOB at rest, mild SOB with walking, speaks normally in sentences, can lie down, no retractions, pulse < 100.     - MODERATE: SOB at rest, SOB with minimal exertion and prefers to sit, cannot lie down flat, speaks in phrases, mild retractions, " "audible wheezing, pulse 100-120.     - SEVERE: Very SOB at rest, speaks in single words, struggling to breathe, sitting hunched forward, retractions, pulse > 120.        Denies any difficulty   10. OTHER SYMPTOMS: \"Do you have any other symptoms?\"  (e.g., fatigue, headache, muscle pain, weakness)        Headache, Chills   11. HIGH RISK DISEASE: \"Do you have any chronic medical problems?\" (e.g., asthma, heart or lung disease, weak immune system, obesity, etc.)         Denies   12. VACCINE: \"Have you gotten the COVID-19 vaccine?\" If Yes, ask: \"Which one, how many shots, when did you get it?\"        Denies   13. PREGNANCY: \"Is there any chance you are pregnant?\" \"When was your last menstrual period?\"          14. O2 SATURATION MONITOR:  \"Do you use an oxygen saturation monitor (pulse oximeter) at home?\" If Yes, ask \"What is your reading (oxygen level) today?\" \"What is your usual oxygen saturation reading?\" (e.g., 95%)         Denies    Protocols used: Coronavirus (COVID-19) Persisting Symptoms Follow-up Call-ADULT-    "

## 2024-09-20 ENCOUNTER — HOSPITAL ENCOUNTER (EMERGENCY)
Facility: HOSPITAL | Age: 54
Discharge: HOME | End: 2024-09-20
Attending: STUDENT IN AN ORGANIZED HEALTH CARE EDUCATION/TRAINING PROGRAM
Payer: COMMERCIAL

## 2024-09-20 VITALS
SYSTOLIC BLOOD PRESSURE: 133 MMHG | OXYGEN SATURATION: 96 % | HEART RATE: 67 BPM | RESPIRATION RATE: 18 BRPM | DIASTOLIC BLOOD PRESSURE: 79 MMHG | WEIGHT: 270 LBS | HEIGHT: 70 IN | BODY MASS INDEX: 38.65 KG/M2 | TEMPERATURE: 97.7 F

## 2024-09-20 DIAGNOSIS — Z79.4 DIABETES MELLITUS TYPE 2, INSULIN DEPENDENT: ICD-10-CM

## 2024-09-20 DIAGNOSIS — T50.905A MEDICATION SIDE EFFECT, INITIAL ENCOUNTER: ICD-10-CM

## 2024-09-20 DIAGNOSIS — R11.2 NAUSEA AND VOMITING, UNSPECIFIED VOMITING TYPE: Primary | ICD-10-CM

## 2024-09-20 DIAGNOSIS — E11.9 DIABETES MELLITUS TYPE 2, INSULIN DEPENDENT: ICD-10-CM

## 2024-09-20 LAB
ACETONE BLD QL: NEGATIVE
ALBUMIN SERPL-MCNC: 3.9 G/DL (ref 3.5–5.2)
ALBUMIN/GLOB SERPL: 1.3 G/DL
ALP SERPL-CCNC: 67 U/L (ref 39–117)
ALT SERPL W P-5'-P-CCNC: 62 U/L (ref 1–41)
ANION GAP SERPL CALCULATED.3IONS-SCNC: 11.2 MMOL/L (ref 5–15)
AST SERPL-CCNC: 43 U/L (ref 1–40)
BASOPHILS # BLD AUTO: 0.03 10*3/MM3 (ref 0–0.2)
BASOPHILS NFR BLD AUTO: 0.3 % (ref 0–1.5)
BILIRUB SERPL-MCNC: 0.7 MG/DL (ref 0–1.2)
BUN SERPL-MCNC: 18 MG/DL (ref 6–20)
BUN/CREAT SERPL: 16.8 (ref 7–25)
CALCIUM SPEC-SCNC: 9.2 MG/DL (ref 8.6–10.5)
CHLORIDE SERPL-SCNC: 96 MMOL/L (ref 98–107)
CO2 SERPL-SCNC: 25.8 MMOL/L (ref 22–29)
CREAT SERPL-MCNC: 1.07 MG/DL (ref 0.76–1.27)
D-LACTATE SERPL-SCNC: 1.9 MMOL/L (ref 0.5–2)
DEPRECATED RDW RBC AUTO: 38.1 FL (ref 37–54)
EGFRCR SERPLBLD CKD-EPI 2021: 83.5 ML/MIN/1.73
EOSINOPHIL # BLD AUTO: 0.05 10*3/MM3 (ref 0–0.4)
EOSINOPHIL NFR BLD AUTO: 0.5 % (ref 0.3–6.2)
ERYTHROCYTE [DISTWIDTH] IN BLOOD BY AUTOMATED COUNT: 12.3 % (ref 12.3–15.4)
GLOBULIN UR ELPH-MCNC: 2.9 GM/DL
GLUCOSE SERPL-MCNC: 186 MG/DL (ref 65–99)
HCT VFR BLD AUTO: 44 % (ref 37.5–51)
HGB BLD-MCNC: 15.3 G/DL (ref 13–17.7)
IMM GRANULOCYTES # BLD AUTO: 0.04 10*3/MM3 (ref 0–0.05)
IMM GRANULOCYTES NFR BLD AUTO: 0.4 % (ref 0–0.5)
LIPASE SERPL-CCNC: 14 U/L (ref 13–60)
LYMPHOCYTES # BLD AUTO: 1.11 10*3/MM3 (ref 0.7–3.1)
LYMPHOCYTES NFR BLD AUTO: 11.5 % (ref 19.6–45.3)
MCH RBC QN AUTO: 29.7 PG (ref 26.6–33)
MCHC RBC AUTO-ENTMCNC: 34.8 G/DL (ref 31.5–35.7)
MCV RBC AUTO: 85.3 FL (ref 79–97)
MONOCYTES # BLD AUTO: 0.34 10*3/MM3 (ref 0.1–0.9)
MONOCYTES NFR BLD AUTO: 3.5 % (ref 5–12)
NEUTROPHILS NFR BLD AUTO: 8.08 10*3/MM3 (ref 1.7–7)
NEUTROPHILS NFR BLD AUTO: 83.8 % (ref 42.7–76)
NRBC BLD AUTO-RTO: 0 /100 WBC (ref 0–0.2)
OSMOLALITY SERPL: 290 MOSM/KG (ref 275–300)
PLATELET # BLD AUTO: 149 10*3/MM3 (ref 140–450)
PMV BLD AUTO: 9.4 FL (ref 6–12)
POTASSIUM SERPL-SCNC: 3.7 MMOL/L (ref 3.5–5.2)
PROT SERPL-MCNC: 6.8 G/DL (ref 6–8.5)
QT INTERVAL: 472 MS
QTC INTERVAL: 490 MS
RBC # BLD AUTO: 5.16 10*6/MM3 (ref 4.14–5.8)
SODIUM SERPL-SCNC: 133 MMOL/L (ref 136–145)
WBC NRBC COR # BLD AUTO: 9.65 10*3/MM3 (ref 3.4–10.8)

## 2024-09-20 PROCEDURE — 82009 KETONE BODYS QUAL: CPT | Performed by: STUDENT IN AN ORGANIZED HEALTH CARE EDUCATION/TRAINING PROGRAM

## 2024-09-20 PROCEDURE — 83690 ASSAY OF LIPASE: CPT | Performed by: STUDENT IN AN ORGANIZED HEALTH CARE EDUCATION/TRAINING PROGRAM

## 2024-09-20 PROCEDURE — 83930 ASSAY OF BLOOD OSMOLALITY: CPT | Performed by: STUDENT IN AN ORGANIZED HEALTH CARE EDUCATION/TRAINING PROGRAM

## 2024-09-20 PROCEDURE — 99283 EMERGENCY DEPT VISIT LOW MDM: CPT

## 2024-09-20 PROCEDURE — 25010000002 DEXAMETHASONE SODIUM PHOSPHATE 10 MG/ML SOLUTION: Performed by: STUDENT IN AN ORGANIZED HEALTH CARE EDUCATION/TRAINING PROGRAM

## 2024-09-20 PROCEDURE — 93010 ELECTROCARDIOGRAM REPORT: CPT | Performed by: INTERNAL MEDICINE

## 2024-09-20 PROCEDURE — 25810000003 SODIUM CHLORIDE 0.9 % SOLUTION: Performed by: STUDENT IN AN ORGANIZED HEALTH CARE EDUCATION/TRAINING PROGRAM

## 2024-09-20 PROCEDURE — 96360 HYDRATION IV INFUSION INIT: CPT

## 2024-09-20 PROCEDURE — 36415 COLL VENOUS BLD VENIPUNCTURE: CPT

## 2024-09-20 PROCEDURE — 83605 ASSAY OF LACTIC ACID: CPT | Performed by: STUDENT IN AN ORGANIZED HEALTH CARE EDUCATION/TRAINING PROGRAM

## 2024-09-20 PROCEDURE — 96372 THER/PROPH/DIAG INJ SC/IM: CPT

## 2024-09-20 PROCEDURE — 80053 COMPREHEN METABOLIC PANEL: CPT | Performed by: STUDENT IN AN ORGANIZED HEALTH CARE EDUCATION/TRAINING PROGRAM

## 2024-09-20 PROCEDURE — 93005 ELECTROCARDIOGRAM TRACING: CPT | Performed by: STUDENT IN AN ORGANIZED HEALTH CARE EDUCATION/TRAINING PROGRAM

## 2024-09-20 PROCEDURE — 85025 COMPLETE CBC W/AUTO DIFF WBC: CPT | Performed by: STUDENT IN AN ORGANIZED HEALTH CARE EDUCATION/TRAINING PROGRAM

## 2024-09-20 RX ORDER — DEXAMETHASONE SODIUM PHOSPHATE 10 MG/ML
5 INJECTION, SOLUTION INTRAMUSCULAR; INTRAVENOUS ONCE
Status: COMPLETED | OUTPATIENT
Start: 2024-09-20 | End: 2024-09-20

## 2024-09-20 RX ORDER — ONDANSETRON 4 MG/1
4 TABLET, ORALLY DISINTEGRATING ORAL EVERY 8 HOURS PRN
Qty: 20 TABLET | Refills: 0 | Status: SHIPPED | OUTPATIENT
Start: 2024-09-20

## 2024-09-20 RX ORDER — MECLIZINE HCL 12.5 MG 12.5 MG/1
25 TABLET ORAL ONCE
Status: COMPLETED | OUTPATIENT
Start: 2024-09-20 | End: 2024-09-20

## 2024-09-20 RX ADMIN — MECLIZINE 25 MG: 12.5 TABLET ORAL at 09:58

## 2024-09-20 RX ADMIN — SODIUM CHLORIDE 1000 ML: 9 INJECTION, SOLUTION INTRAVENOUS at 06:27

## 2024-09-20 RX ADMIN — DEXAMETHASONE SODIUM PHOSPHATE 5 MG: 10 INJECTION INTRAMUSCULAR; INTRAVENOUS at 09:58

## 2024-10-03 ENCOUNTER — TRANSCRIBE ORDERS (OUTPATIENT)
Dept: ADMINISTRATIVE | Facility: HOSPITAL | Age: 54
End: 2024-10-03
Payer: COMMERCIAL

## 2024-10-03 DIAGNOSIS — C07 MALIGNANT NEOPLASM OF PAROTID GLAND: Primary | ICD-10-CM

## 2024-10-29 ENCOUNTER — HOSPITAL ENCOUNTER (OUTPATIENT)
Dept: MRI IMAGING | Facility: HOSPITAL | Age: 54
Discharge: HOME | End: 2024-10-29
Admitting: PHYSICIAN ASSISTANT
Payer: COMMERCIAL

## 2024-10-29 DIAGNOSIS — C07 MALIGNANT NEOPLASM OF PAROTID GLAND: ICD-10-CM

## 2024-10-29 PROCEDURE — 70551 MRI BRAIN STEM W/O DYE: CPT

## 2024-10-29 PROCEDURE — 70551 MRI BRAIN STEM W/O DYE: CPT | Performed by: RADIOLOGY

## 2024-11-28 ENCOUNTER — APPOINTMENT (OUTPATIENT)
Dept: CT IMAGING | Facility: HOSPITAL | Age: 54
End: 2024-11-28
Payer: MEDICAID

## 2024-11-28 ENCOUNTER — HOSPITAL ENCOUNTER (EMERGENCY)
Facility: HOSPITAL | Age: 54
Discharge: HOME | End: 2024-11-28
Attending: STUDENT IN AN ORGANIZED HEALTH CARE EDUCATION/TRAINING PROGRAM | Admitting: STUDENT IN AN ORGANIZED HEALTH CARE EDUCATION/TRAINING PROGRAM
Payer: MEDICAID

## 2024-11-28 VITALS
RESPIRATION RATE: 18 BRPM | TEMPERATURE: 97.3 F | OXYGEN SATURATION: 95 % | HEART RATE: 74 BPM | BODY MASS INDEX: 40.74 KG/M2 | HEIGHT: 70 IN | DIASTOLIC BLOOD PRESSURE: 73 MMHG | SYSTOLIC BLOOD PRESSURE: 123 MMHG | WEIGHT: 284.6 LBS

## 2024-11-28 DIAGNOSIS — N20.1 RIGHT URETERAL STONE: Primary | ICD-10-CM

## 2024-11-28 LAB
ALBUMIN SERPL-MCNC: 4.2 G/DL (ref 3.5–5.2)
ALBUMIN/GLOB SERPL: 1.6 G/DL
ALP SERPL-CCNC: 95 U/L (ref 39–117)
ALT SERPL W P-5'-P-CCNC: 58 U/L (ref 1–41)
ANION GAP SERPL CALCULATED.3IONS-SCNC: 12.1 MMOL/L (ref 5–15)
AST SERPL-CCNC: 29 U/L (ref 1–40)
BACTERIA UR QL AUTO: ABNORMAL /HPF
BASOPHILS # BLD AUTO: 0.02 10*3/MM3 (ref 0–0.2)
BASOPHILS NFR BLD AUTO: 0.3 % (ref 0–1.5)
BILIRUB SERPL-MCNC: 0.2 MG/DL (ref 0–1.2)
BILIRUB UR QL STRIP: NEGATIVE
BUN SERPL-MCNC: 16 MG/DL (ref 6–20)
BUN/CREAT SERPL: 13.4 (ref 7–25)
CALCIUM SPEC-SCNC: 9.2 MG/DL (ref 8.6–10.5)
CHLORIDE SERPL-SCNC: 100 MMOL/L (ref 98–107)
CLARITY UR: CLEAR
CO2 SERPL-SCNC: 24.9 MMOL/L (ref 22–29)
COLOR UR: ABNORMAL
CREAT SERPL-MCNC: 1.19 MG/DL (ref 0.76–1.27)
CRP SERPL-MCNC: 0.35 MG/DL (ref 0–0.5)
DEPRECATED RDW RBC AUTO: 38.3 FL (ref 37–54)
EGFRCR SERPLBLD CKD-EPI 2021: 73.5 ML/MIN/1.73
EOSINOPHIL # BLD AUTO: 0.12 10*3/MM3 (ref 0–0.4)
EOSINOPHIL NFR BLD AUTO: 2 % (ref 0.3–6.2)
ERYTHROCYTE [DISTWIDTH] IN BLOOD BY AUTOMATED COUNT: 12.2 % (ref 12.3–15.4)
GLOBULIN UR ELPH-MCNC: 2.6 GM/DL
GLUCOSE SERPL-MCNC: 167 MG/DL (ref 65–99)
GLUCOSE UR STRIP-MCNC: NEGATIVE MG/DL
HCT VFR BLD AUTO: 44.6 % (ref 37.5–51)
HGB BLD-MCNC: 15.3 G/DL (ref 13–17.7)
HGB UR QL STRIP.AUTO: ABNORMAL
HOLD SPECIMEN: NORMAL
HOLD SPECIMEN: NORMAL
HYALINE CASTS UR QL AUTO: ABNORMAL /LPF
IMM GRANULOCYTES # BLD AUTO: 0.03 10*3/MM3 (ref 0–0.05)
IMM GRANULOCYTES NFR BLD AUTO: 0.5 % (ref 0–0.5)
KETONES UR QL STRIP: ABNORMAL
LEUKOCYTE ESTERASE UR QL STRIP.AUTO: NEGATIVE
LYMPHOCYTES # BLD AUTO: 2 10*3/MM3 (ref 0.7–3.1)
LYMPHOCYTES NFR BLD AUTO: 33.7 % (ref 19.6–45.3)
MCH RBC QN AUTO: 29.5 PG (ref 26.6–33)
MCHC RBC AUTO-ENTMCNC: 34.3 G/DL (ref 31.5–35.7)
MCV RBC AUTO: 86.1 FL (ref 79–97)
MONOCYTES # BLD AUTO: 0.41 10*3/MM3 (ref 0.1–0.9)
MONOCYTES NFR BLD AUTO: 6.9 % (ref 5–12)
NEUTROPHILS NFR BLD AUTO: 3.35 10*3/MM3 (ref 1.7–7)
NEUTROPHILS NFR BLD AUTO: 56.6 % (ref 42.7–76)
NITRITE UR QL STRIP: NEGATIVE
NRBC BLD AUTO-RTO: 0 /100 WBC (ref 0–0.2)
PH UR STRIP.AUTO: 5.5 [PH] (ref 5–8)
PLATELET # BLD AUTO: 175 10*3/MM3 (ref 140–450)
PMV BLD AUTO: 9.1 FL (ref 6–12)
POTASSIUM SERPL-SCNC: 4.1 MMOL/L (ref 3.5–5.2)
PROT SERPL-MCNC: 6.8 G/DL (ref 6–8.5)
PROT UR QL STRIP: NEGATIVE
RBC # BLD AUTO: 5.18 10*6/MM3 (ref 4.14–5.8)
RBC # UR STRIP: ABNORMAL /HPF
REF LAB TEST METHOD: ABNORMAL
SODIUM SERPL-SCNC: 137 MMOL/L (ref 136–145)
SP GR UR STRIP: 1.02 (ref 1–1.03)
SQUAMOUS #/AREA URNS HPF: ABNORMAL /HPF
UROBILINOGEN UR QL STRIP: ABNORMAL
WBC # UR STRIP: ABNORMAL /HPF
WBC NRBC COR # BLD AUTO: 5.93 10*3/MM3 (ref 3.4–10.8)
WHOLE BLOOD HOLD COAG: NORMAL
WHOLE BLOOD HOLD SPECIMEN: NORMAL

## 2024-11-28 PROCEDURE — 25010000002 KETOROLAC TROMETHAMINE PER 15 MG: Performed by: PHYSICIAN ASSISTANT

## 2024-11-28 PROCEDURE — 81001 URINALYSIS AUTO W/SCOPE: CPT | Performed by: PHYSICIAN ASSISTANT

## 2024-11-28 PROCEDURE — 25010000002 ONDANSETRON PER 1 MG: Performed by: PHYSICIAN ASSISTANT

## 2024-11-28 PROCEDURE — 86140 C-REACTIVE PROTEIN: CPT | Performed by: PHYSICIAN ASSISTANT

## 2024-11-28 PROCEDURE — 85025 COMPLETE CBC W/AUTO DIFF WBC: CPT | Performed by: PHYSICIAN ASSISTANT

## 2024-11-28 PROCEDURE — 25810000003 SODIUM CHLORIDE 0.9 % SOLUTION: Performed by: PHYSICIAN ASSISTANT

## 2024-11-28 PROCEDURE — 96375 TX/PRO/DX INJ NEW DRUG ADDON: CPT

## 2024-11-28 PROCEDURE — 63710000001 ONDANSETRON ODT 4 MG TABLET DISPERSIBLE: Performed by: PHYSICIAN ASSISTANT

## 2024-11-28 PROCEDURE — 96374 THER/PROPH/DIAG INJ IV PUSH: CPT

## 2024-11-28 PROCEDURE — 74176 CT ABD & PELVIS W/O CONTRAST: CPT

## 2024-11-28 PROCEDURE — 99284 EMERGENCY DEPT VISIT MOD MDM: CPT

## 2024-11-28 PROCEDURE — 80053 COMPREHEN METABOLIC PANEL: CPT | Performed by: PHYSICIAN ASSISTANT

## 2024-11-28 PROCEDURE — 51798 US URINE CAPACITY MEASURE: CPT

## 2024-11-28 RX ORDER — KETOROLAC TROMETHAMINE 10 MG/1
10 TABLET, FILM COATED ORAL EVERY 6 HOURS PRN
Qty: 12 TABLET | Refills: 0 | Status: SHIPPED | OUTPATIENT
Start: 2024-11-28

## 2024-11-28 RX ORDER — ONDANSETRON 4 MG/1
4 TABLET, ORALLY DISINTEGRATING ORAL EVERY 6 HOURS PRN
Status: DISCONTINUED | OUTPATIENT
Start: 2024-11-28 | End: 2024-11-28 | Stop reason: HOSPADM

## 2024-11-28 RX ORDER — KETOROLAC TROMETHAMINE 30 MG/ML
30 INJECTION, SOLUTION INTRAMUSCULAR; INTRAVENOUS EVERY 6 HOURS PRN
Status: DISCONTINUED | OUTPATIENT
Start: 2024-11-28 | End: 2024-11-28 | Stop reason: HOSPADM

## 2024-11-28 RX ORDER — TAMSULOSIN HYDROCHLORIDE 0.4 MG/1
0.4 CAPSULE ORAL ONCE
Status: COMPLETED | OUTPATIENT
Start: 2024-11-28 | End: 2024-11-28

## 2024-11-28 RX ORDER — ONDANSETRON 4 MG/1
4 TABLET, ORALLY DISINTEGRATING ORAL EVERY 6 HOURS PRN
Qty: 10 TABLET | Refills: 0 | Status: SHIPPED | OUTPATIENT
Start: 2024-11-28

## 2024-11-28 RX ORDER — ONDANSETRON 2 MG/ML
4 INJECTION INTRAMUSCULAR; INTRAVENOUS ONCE
Status: COMPLETED | OUTPATIENT
Start: 2024-11-28 | End: 2024-11-28

## 2024-11-28 RX ORDER — TAMSULOSIN HYDROCHLORIDE 0.4 MG/1
1 CAPSULE ORAL DAILY
Qty: 14 CAPSULE | Refills: 0 | Status: SHIPPED | OUTPATIENT
Start: 2024-11-28

## 2024-11-28 RX ORDER — KETOROLAC TROMETHAMINE 10 MG/1
10 TABLET, FILM COATED ORAL EVERY 6 HOURS
Status: DISCONTINUED | OUTPATIENT
Start: 2024-11-28 | End: 2024-11-28 | Stop reason: HOSPADM

## 2024-11-28 RX ORDER — SODIUM CHLORIDE 0.9 % (FLUSH) 0.9 %
10 SYRINGE (ML) INJECTION AS NEEDED
Status: DISCONTINUED | OUTPATIENT
Start: 2024-11-28 | End: 2024-11-28 | Stop reason: HOSPADM

## 2024-11-28 RX ORDER — OXYCODONE AND ACETAMINOPHEN 10; 325 MG/1; MG/1
1 TABLET ORAL ONCE
Status: DISCONTINUED | OUTPATIENT
Start: 2024-11-28 | End: 2024-11-28

## 2024-11-28 RX ADMIN — TAMSULOSIN HYDROCHLORIDE 0.4 MG: 0.4 CAPSULE ORAL at 07:27

## 2024-11-28 RX ADMIN — KETOROLAC TROMETHAMINE 30 MG: 30 INJECTION, SOLUTION INTRAMUSCULAR; INTRAVENOUS at 07:26

## 2024-11-28 RX ADMIN — ONDANSETRON 4 MG: 2 INJECTION INTRAMUSCULAR; INTRAVENOUS at 05:54

## 2024-11-28 RX ADMIN — SODIUM CHLORIDE 1000 ML: 9 INJECTION, SOLUTION INTRAVENOUS at 08:24

## 2024-11-28 RX ADMIN — SODIUM CHLORIDE 500 ML: 9 INJECTION, SOLUTION INTRAVENOUS at 05:54

## 2024-11-28 RX ADMIN — ONDANSETRON 4 MG: 4 TABLET, ORALLY DISINTEGRATING ORAL at 11:15

## 2024-11-28 RX ADMIN — KETOROLAC TROMETHAMINE 10 MG: 10 TABLET, FILM COATED ORAL at 11:15

## 2024-11-28 NOTE — DISCHARGE INSTRUCTIONS
Rest at home, make sure you are drinking plenty of fluids.  Follow-up with urology in the clinic at the next available appointment.  Return to the ED at any time if symptoms change or worsen.  Strain all urine until you pass the stone.

## 2024-11-28 NOTE — ED PROVIDER NOTES
Subjective   History of Present Illness  Mr. Alek Munoz is a 54 year old male with past medical history significant for parotid cancer, diabetes mellitus, GERD, and neuropathy. He presents with right flank pain that began around 4:30 this morning. He reports an extensive history of kidney stones and sees Dr. Adkins. He occasionally requires surgical intervention. He denies fever or chills but reports nausea and pain with urination.         Review of Systems   Constitutional: Negative.    HENT: Negative.     Respiratory: Negative.     Cardiovascular: Negative.    Gastrointestinal:  Positive for abdominal pain and nausea. Negative for diarrhea and vomiting.   Genitourinary:  Positive for dysuria and flank pain. Negative for difficulty urinating.   Neurological: Negative.        Past Medical History:   Diagnosis Date    Back pain     Cancer     PAROTID CANCER    Degenerative joint disease     Diabetes mellitus     DVT, lower extremity     left 2013    GERD (gastroesophageal reflux disease)     Kidney stones     Neuropathy     FEET     Peroneal DVT (deep venous thrombosis)     in 1998 and dvt 2010    Pulmonary embolism     Spinal stenosis     Urolithiasis     Vertigo     OCASSIONALLY       Allergies   Allergen Reactions    Morphine Seizure       Past Surgical History:   Procedure Laterality Date    APPENDECTOMY      COLONOSCOPY      CYSTOSCOPY W/ LITHOLAPAXY / EHL      INNER EAR SURGERY      OTHER SURGICAL HISTORY      Mass removed from left side of face    SKIN BIOPSY      URETEROSCOPY LASER LITHOTRIPSY WITH STENT INSERTION Left 3/1/2024    Procedure: URETEROSCOPY LASER LITHOTRIPSY;  Surgeon: Angel Adkins MD;  Location: [facility];  Service: Urology;  Laterality: Left;    VASECTOMY Bilateral 03/04/2020    Procedure: VASECTOMY;  Surgeon: Angel Adkins MD;  Location: [facility];  Service: Urology;  Laterality: Bilateral;       Family History   Problem Relation Age of Onset    Other Other          deveral siblings tatyana had early onset DVTs    No Known Problems Mother     No Known Problems Father        Social History     Socioeconomic History    Marital status:    Tobacco Use    Smoking status: Former     Current packs/day: 0.00     Types: Cigarettes     Start date:      Quit date:      Years since quittin.9    Smokeless tobacco: Former     Types: Chew     Quit date:     Tobacco comments:     occasionally uses chew once a year    Vaping Use    Vaping status: Never Used   Substance and Sexual Activity    Alcohol use: No     Comment: beer occasionally    Drug use: No    Sexual activity: Defer           Objective   Physical Exam  Constitutional:       Appearance: Normal appearance. He is obese.   Cardiovascular:      Rate and Rhythm: Normal rate and regular rhythm.   Pulmonary:      Effort: Pulmonary effort is normal.      Breath sounds: Normal breath sounds.   Abdominal:      General: Bowel sounds are normal.      Palpations: Abdomen is soft.      Tenderness: There is right CVA tenderness.   Neurological:      General: No focal deficit present.      Mental Status: He is alert and oriented to person, place, and time.         Procedures           ED Course  ED Course as of 24 1040   Thu 2024   0710 CT results reviewed and discussed with patient. He reports persistent pain but improved nausea. He has not been able to urinate yet. [NU]   0812 Patient has 0 mL in bladder on bladder scan [AH]   0915 Patient reports that he is unable to urinate as of yet.  He has had 1500 mL of normal saline IV.  He is going to drink oral fluids at this time. [AH]      ED Course User Index  [AH] Pao Huynh PA  [NU] Elva Rodriguez PA-C        CT Abdomen Pelvis Stone Protocol    Result Date: 2024   1.  There is a 3 mm stone at the right UVJ with associated partial right renal obstruction. 2.  There is a 2 mm stone at the lower pole of the right kidney. 3.  Old posterior lateral sixth  and seventh rib fractures. 4.  Left lower lobe calcified granuloma. 5.  Normal appendix. 6.  Mild enlarged prostate gland. 7.  No free fluid or free air. 8.  No abscess or hematoma. 9.  Degenerative disc disease in the lumbar spine with endplate and facet hypertrophic changes.   This report was finalized on 11/28/2024 6:57 AM by Arnol Tee MD.       Results for orders placed or performed during the hospital encounter of 11/28/24   Comprehensive Metabolic Panel    Collection Time: 11/28/24  5:46 AM    Specimen: Blood   Result Value Ref Range    Glucose 167 (H) 65 - 99 mg/dL    BUN 16 6 - 20 mg/dL    Creatinine 1.19 0.76 - 1.27 mg/dL    Sodium 137 136 - 145 mmol/L    Potassium 4.1 3.5 - 5.2 mmol/L    Chloride 100 98 - 107 mmol/L    CO2 24.9 22.0 - 29.0 mmol/L    Calcium 9.2 8.6 - 10.5 mg/dL    Total Protein 6.8 6.0 - 8.5 g/dL    Albumin 4.2 3.5 - 5.2 g/dL    ALT (SGPT) 58 (H) 1 - 41 U/L    AST (SGOT) 29 1 - 40 U/L    Alkaline Phosphatase 95 39 - 117 U/L    Total Bilirubin 0.2 0.0 - 1.2 mg/dL    Globulin 2.6 gm/dL    A/G Ratio 1.6 g/dL    BUN/Creatinine Ratio 13.4 7.0 - 25.0    Anion Gap 12.1 5.0 - 15.0 mmol/L    eGFR 73.5 >60.0 mL/min/1.73   C-reactive Protein    Collection Time: 11/28/24  5:46 AM    Specimen: Blood   Result Value Ref Range    C-Reactive Protein 0.35 0.00 - 0.50 mg/dL   CBC Auto Differential    Collection Time: 11/28/24  5:46 AM    Specimen: Blood   Result Value Ref Range    WBC 5.93 3.40 - 10.80 10*3/mm3    RBC 5.18 4.14 - 5.80 10*6/mm3    Hemoglobin 15.3 13.0 - 17.7 g/dL    Hematocrit 44.6 37.5 - 51.0 %    MCV 86.1 79.0 - 97.0 fL    MCH 29.5 26.6 - 33.0 pg    MCHC 34.3 31.5 - 35.7 g/dL    RDW 12.2 (L) 12.3 - 15.4 %    RDW-SD 38.3 37.0 - 54.0 fl    MPV 9.1 6.0 - 12.0 fL    Platelets 175 140 - 450 10*3/mm3    Neutrophil % 56.6 42.7 - 76.0 %    Lymphocyte % 33.7 19.6 - 45.3 %    Monocyte % 6.9 5.0 - 12.0 %    Eosinophil % 2.0 0.3 - 6.2 %    Basophil % 0.3 0.0 - 1.5 %    Immature Grans % 0.5 0.0 -  0.5 %    Neutrophils, Absolute 3.35 1.70 - 7.00 10*3/mm3    Lymphocytes, Absolute 2.00 0.70 - 3.10 10*3/mm3    Monocytes, Absolute 0.41 0.10 - 0.90 10*3/mm3    Eosinophils, Absolute 0.12 0.00 - 0.40 10*3/mm3    Basophils, Absolute 0.02 0.00 - 0.20 10*3/mm3    Immature Grans, Absolute 0.03 0.00 - 0.05 10*3/mm3    nRBC 0.0 0.0 - 0.2 /100 WBC   Green Top (Gel)    Collection Time: 11/28/24  5:46 AM   Result Value Ref Range    Extra Tube Hold for add-ons.    Lavender Top    Collection Time: 11/28/24  5:46 AM   Result Value Ref Range    Extra Tube hold for add-on    Gold Top - SST    Collection Time: 11/28/24  5:46 AM   Result Value Ref Range    Extra Tube Hold for add-ons.    Light Blue Top    Collection Time: 11/28/24  5:46 AM   Result Value Ref Range    Extra Tube Hold for add-ons.    Urinalysis With Culture If Indicated - Urine, Clean Catch    Collection Time: 11/28/24  9:20 AM    Specimen: Urine, Clean Catch   Result Value Ref Range    Color, UA Dark Yellow (A) Yellow, Straw    Appearance, UA Clear Clear    pH, UA 5.5 5.0 - 8.0    Specific Gravity, UA 1.023 1.005 - 1.030    Glucose, UA Negative Negative    Ketones, UA Trace (A) Negative    Bilirubin, UA Negative Negative    Blood, UA Large (3+) (A) Negative    Protein, UA Negative Negative    Leuk Esterase, UA Negative Negative    Nitrite, UA Negative Negative    Urobilinogen, UA 1.0 E.U./dL 0.2 - 1.0 E.U./dL   Urinalysis, Microscopic Only - Urine, Clean Catch    Collection Time: 11/28/24  9:20 AM    Specimen: Urine, Clean Catch   Result Value Ref Range    RBC, UA 11-20 (A) None Seen, 0-2 /HPF    WBC, UA 3-5 (A) None Seen, 0-2 /HPF    Bacteria, UA None Seen None Seen /HPF    Squamous Epithelial Cells, UA None Seen None Seen, 0-2 /HPF    Hyaline Casts, UA None Seen None Seen /LPF    Methodology Automated Microscopy                                           Electronically signed by Elva Rodriguez PA-C, 11/28/24, 7:52 AM EST.            Medical Decision Making    Alek Munoz is a 54 year old male with past medical history significant for parotid cancer, diabetes mellitus, GERD, and neuropathy. He presents with right flank pain that began around 4:30 this morning. He reports an extensive history of kidney stones and sees Dr. Adkins. He occasionally requires surgical intervention. He denies fever or chills but reports nausea and pain with urination.   Patient was found to have a 3 mm right ureteral stone at the UVJ and a 2 mm stone at the lower pole of the right kidney.  He was given IV fluids and IV Toradol which helped with his pain.  Plan is for discharge home and he will follow-up outpatient with urology clinic.  He was discharged home on Toradol and Zofran.  He already has pain medication at home that he can take as needed as well.  He was advised to return to the ED at any time if symptoms change or worsen.    Problems Addressed:  Right ureteral stone: complicated acute illness or injury    Amount and/or Complexity of Data Reviewed  Labs: ordered.  Radiology: ordered.    Risk  Prescription drug management.        Final diagnoses:   Right ureteral stone       ED Disposition  ED Disposition       ED Disposition   Discharge    Condition   Stable    Comment   --               Angel Adkins MD  [address] [facility] [address]  [facility] [address]  [phone]    Call in 1 day           Medication List        Changed      ondansetron ODT 4 MG disintegrating tablet  Commonly known as: ZOFRAN-ODT  Place 1 tablet on the tongue Every 6 (Six) Hours As Needed for Nausea or Vomiting.  What changed: when to take this     oxyCODONE-acetaminophen  MG per tablet  Commonly known as: PERCOCET  What changed: Another medication with the same name was removed. Continue taking this medication, and follow the directions you see here.     * tamsulosin 0.4 MG capsule 24 hr capsule  Commonly known as: FLOMAX  What changed: Another medication with the same name was added. Make sure you  understand how and when to take each.     * tamsulosin 0.4 MG capsule 24 hr capsule  Commonly known as: Flomax  Take 1 capsule by mouth Every Night.  What changed: Another medication with the same name was added. Make sure you understand how and when to take each.     * tamsulosin 0.4 MG capsule 24 hr capsule  Commonly known as: FLOMAX  Take 1 capsule by mouth Daily.  What changed: You were already taking a medication with the same name, and this prescription was added. Make sure you understand how and when to take each.     tiZANidine 4 MG tablet  Commonly known as: ZANAFLEX  What changed: Another medication with the same name was removed. Continue taking this medication, and follow the directions you see here.           * This list has 3 medication(s) that are the same as other medications prescribed for you. Read the directions carefully, and ask your doctor or other care provider to review them with you.                Stop      ondansetron 4 MG tablet  Commonly known as: ZOFRAN     penicillin v potassium 500 MG tablet  Commonly known as: VEETID               Where to Get Your Medications        These medications were sent to [place], [place] - [address] - [phone] [facility] [phone]   [address] [facility] [address] [facility] [address]      Phone: [phone]   ketorolac 10 MG tablet  ondansetron ODT 4 MG disintegrating tablet  tamsulosin 0.4 MG capsule 24 hr capsule            Pao Huynh PA  11/28/24 8270

## 2024-11-28 NOTE — ED NOTES
"Provided patient with cup for UA at this time, patient stated \"it will be awhile before I can go.\"  "

## 2025-03-10 ENCOUNTER — HOSPITAL ENCOUNTER (EMERGENCY)
Facility: HOSPITAL | Age: 55
Discharge: HOME | End: 2025-03-11
Payer: MEDICAID

## 2025-03-10 DIAGNOSIS — K62.5 RECTAL BLEEDING: Primary | ICD-10-CM

## 2025-03-10 LAB
ABO GROUP BLD: NORMAL
ABO GROUP BLD: NORMAL
ALBUMIN SERPL-MCNC: 3.8 G/DL (ref 3.5–5.2)
ALBUMIN/GLOB SERPL: 1.2 G/DL
ALP SERPL-CCNC: 99 U/L (ref 39–117)
ALT SERPL W P-5'-P-CCNC: 52 U/L (ref 1–41)
ANION GAP SERPL CALCULATED.3IONS-SCNC: 10.5 MMOL/L (ref 5–15)
AST SERPL-CCNC: 38 U/L (ref 1–40)
BASOPHILS # BLD AUTO: 0.04 10*3/MM3 (ref 0–0.2)
BASOPHILS NFR BLD AUTO: 0.5 % (ref 0–1.5)
BILIRUB SERPL-MCNC: 0.2 MG/DL (ref 0–1.2)
BLD GP AB SCN SERPL QL: NEGATIVE
BUN SERPL-MCNC: 15 MG/DL (ref 6–20)
BUN/CREAT SERPL: 12.7 (ref 7–25)
CALCIUM SPEC-SCNC: 9.4 MG/DL (ref 8.6–10.5)
CHLORIDE SERPL-SCNC: 101 MMOL/L (ref 98–107)
CO2 SERPL-SCNC: 25.5 MMOL/L (ref 22–29)
CREAT SERPL-MCNC: 1.18 MG/DL (ref 0.76–1.27)
D-LACTATE SERPL-SCNC: 2.6 MMOL/L (ref 0.5–2)
DEPRECATED RDW RBC AUTO: 38.9 FL (ref 37–54)
EGFRCR SERPLBLD CKD-EPI 2021: 74.2 ML/MIN/1.73
EOSINOPHIL # BLD AUTO: 0.11 10*3/MM3 (ref 0–0.4)
EOSINOPHIL NFR BLD AUTO: 1.5 % (ref 0.3–6.2)
ERYTHROCYTE [DISTWIDTH] IN BLOOD BY AUTOMATED COUNT: 12.3 % (ref 12.3–15.4)
GLOBULIN UR ELPH-MCNC: 3.3 GM/DL
GLUCOSE SERPL-MCNC: 232 MG/DL (ref 65–99)
HCT VFR BLD AUTO: 48.6 % (ref 37.5–51)
HGB BLD-MCNC: 16.8 G/DL (ref 13–17.7)
HOLD SPECIMEN: NORMAL
HOLD SPECIMEN: NORMAL
IMM GRANULOCYTES # BLD AUTO: 0.01 10*3/MM3 (ref 0–0.05)
IMM GRANULOCYTES NFR BLD AUTO: 0.1 % (ref 0–0.5)
INR PPP: 1.09 (ref 0.9–1.1)
LYMPHOCYTES # BLD AUTO: 2 10*3/MM3 (ref 0.7–3.1)
LYMPHOCYTES NFR BLD AUTO: 26.8 % (ref 19.6–45.3)
MCH RBC QN AUTO: 29.9 PG (ref 26.6–33)
MCHC RBC AUTO-ENTMCNC: 34.6 G/DL (ref 31.5–35.7)
MCV RBC AUTO: 86.5 FL (ref 79–97)
MONOCYTES # BLD AUTO: 0.5 10*3/MM3 (ref 0.1–0.9)
MONOCYTES NFR BLD AUTO: 6.7 % (ref 5–12)
NEUTROPHILS NFR BLD AUTO: 4.79 10*3/MM3 (ref 1.7–7)
NEUTROPHILS NFR BLD AUTO: 64.4 % (ref 42.7–76)
NRBC BLD AUTO-RTO: 0 /100 WBC (ref 0–0.2)
PLATELET # BLD AUTO: 200 10*3/MM3 (ref 140–450)
PMV BLD AUTO: 9.4 FL (ref 6–12)
POTASSIUM SERPL-SCNC: 4.3 MMOL/L (ref 3.5–5.2)
PROT SERPL-MCNC: 7.1 G/DL (ref 6–8.5)
PROTHROMBIN TIME: 14.2 SECONDS (ref 11.6–15.1)
RBC # BLD AUTO: 5.62 10*6/MM3 (ref 4.14–5.8)
RH BLD: POSITIVE
RH BLD: POSITIVE
SODIUM SERPL-SCNC: 137 MMOL/L (ref 136–145)
T&S EXPIRATION DATE: NORMAL
WBC NRBC COR # BLD AUTO: 7.45 10*3/MM3 (ref 3.4–10.8)
WHOLE BLOOD HOLD COAG: NORMAL
WHOLE BLOOD HOLD SPECIMEN: NORMAL

## 2025-03-10 PROCEDURE — 86901 BLOOD TYPING SEROLOGIC RH(D): CPT

## 2025-03-10 PROCEDURE — 86900 BLOOD TYPING SEROLOGIC ABO: CPT

## 2025-03-10 PROCEDURE — 86850 RBC ANTIBODY SCREEN: CPT

## 2025-03-10 PROCEDURE — 83605 ASSAY OF LACTIC ACID: CPT

## 2025-03-10 PROCEDURE — 36415 COLL VENOUS BLD VENIPUNCTURE: CPT

## 2025-03-10 PROCEDURE — 85025 COMPLETE CBC W/AUTO DIFF WBC: CPT

## 2025-03-10 PROCEDURE — 85610 PROTHROMBIN TIME: CPT

## 2025-03-10 PROCEDURE — 80053 COMPREHEN METABOLIC PANEL: CPT

## 2025-03-10 PROCEDURE — 99283 EMERGENCY DEPT VISIT LOW MDM: CPT

## 2025-03-10 RX ORDER — SODIUM CHLORIDE 0.9 % (FLUSH) 0.9 %
10 SYRINGE (ML) INJECTION AS NEEDED
Status: DISCONTINUED | OUTPATIENT
Start: 2025-03-10 | End: 2025-03-11 | Stop reason: HOSPADM

## 2025-03-11 VITALS
BODY MASS INDEX: 42.92 KG/M2 | SYSTOLIC BLOOD PRESSURE: 136 MMHG | OXYGEN SATURATION: 98 % | HEIGHT: 70 IN | RESPIRATION RATE: 19 BRPM | WEIGHT: 299.8 LBS | HEART RATE: 70 BPM | TEMPERATURE: 98.6 F | DIASTOLIC BLOOD PRESSURE: 89 MMHG

## 2025-03-11 NOTE — ED PROVIDER NOTES
Subjective   History of Present Illness  Patient left before assessment        Review of Systems   Unable to perform ROS: Other (patient left before assessment)       Past Medical History:   Diagnosis Date    Back pain     Cancer     PAROTID CANCER    Degenerative joint disease     Diabetes mellitus     DVT, lower extremity     left     GERD (gastroesophageal reflux disease)     Kidney stones     Neuropathy     FEET     Peroneal DVT (deep venous thrombosis)     in  and dvt     Pulmonary embolism     Spinal stenosis     Urolithiasis     Vertigo     OCASSIONALLY       Allergies   Allergen Reactions    Morphine Seizure       Past Surgical History:   Procedure Laterality Date    APPENDECTOMY      COLONOSCOPY      CYSTOSCOPY W/ LITHOLAPAXY / EHL      INNER EAR SURGERY      OTHER SURGICAL HISTORY      Mass removed from left side of face    SKIN BIOPSY      URETEROSCOPY LASER LITHOTRIPSY WITH STENT INSERTION Left 3/1/2024    Procedure: URETEROSCOPY LASER LITHOTRIPSY;  Surgeon: Angel Adkins MD;  Location: [facility];  Service: Urology;  Laterality: Left;    VASECTOMY Bilateral 2020    Procedure: VASECTOMY;  Surgeon: Angel Adkins MD;  Location: [facility];  Service: Urology;  Laterality: Bilateral;       Family History   Problem Relation Age of Onset    Other Other         deveral siblings havd had early onset DVTs    No Known Problems Mother     No Known Problems Father        Social History     Socioeconomic History    Marital status:    Tobacco Use    Smoking status: Former     Current packs/day: 0.00     Types: Cigarettes     Start date:      Quit date:      Years since quittin.2    Smokeless tobacco: Former     Types: Chew     Quit date:     Tobacco comments:     occasionally uses chew once a year    Vaping Use    Vaping status: Never Used   Substance and Sexual Activity    Alcohol use: No     Comment: beer occasionally    Drug use: No    Sexual activity:  Defer           Objective   Physical Exam  Vitals (patient left before assessment) reviewed.         Procedures           ED Course  ED Course as of 03/11/25 0103   Tue Mar 11, 2025   0044 WBC: 7.45 [GILES]   0044 Hemoglobin: 16.8 [GILES]   0044 Platelets: 200 [GILES]   0044 Comprehensive Metabolic Panel(!)  NEgative [GILES]   0044 Lactate(!!): 2.6 [GILES]   0044 BP: 169/94 [GILES]   0045 Temp: 98.6 °F (37 °C) [GILES]   0045 Temp src: Oral [GILES]   0045 Heart Rate: 70 [GILES]   0045 Resp: 19 [GILES]   0045 SpO2: 97 % [GILES]   0045 Device (Oxygen Therapy): room air [GILES]      ED Course User Index  [GILES] Tee Degroot MD                                                       Medical Decision Making  MDM:  Patient left AMA before assessment.    -----------------------------------------------------            03/10/25  03/10/25  03/11/25  03/11/25               2343      2344      0000      0002     -----------------------------------------------------   BP:                           164/96    136/89     BP Location:                                           Patient Position:                                           Pulse:      76        80        65        66       Resp:                                              Temp:                                              TempSrc:                                           SpO2:      97%       94%       97%                 Weight:                                            Height:                                           -----------------------------------------------------      Tee Degroot MD  Emergency Medicine      Amount and/or Complexity of Data Reviewed  Labs: ordered. Decision-making details documented in ED Course.    Risk  Prescription drug management.        Final diagnoses:   Rectal bleeding       ED Disposition  ED Disposition       ED Disposition   Eloped    Condition   --    Comment   --               No  follow-up provider specified.       Medication List      No changes were made to your prescriptions during this visit.            Tee Degroot MD  03/11/25 0103

## 2025-03-11 NOTE — ED NOTES
Triage  came and told nursing staff pt left because he advised he had not seen ED staff since arriving. Pt had been hooked up to monitors and had blood taken. Pt was told if he wanted to stay we would accommodate the things he needs and he says no. ED provider and charge nurse made aware as well as primary nurse. Pt left without signing any paperwork and said he did not want to. Pt A&Ox4 and fully ambulatory.

## 2025-03-28 ENCOUNTER — HOSPITAL ENCOUNTER (OUTPATIENT)
Dept: GENERAL RADIOLOGY | Facility: HOSPITAL | Age: 55
Discharge: HOME | End: 2025-03-28
Payer: MEDICAID

## 2025-03-28 ENCOUNTER — OFFICE VISIT (OUTPATIENT)
Dept: UROLOGY | Facility: CLINIC | Age: 55
End: 2025-03-28
Payer: MEDICAID

## 2025-03-28 VITALS
BODY MASS INDEX: 35.22 KG/M2 | HEIGHT: 70 IN | WEIGHT: 246 LBS | HEART RATE: 63 BPM | DIASTOLIC BLOOD PRESSURE: 81 MMHG | SYSTOLIC BLOOD PRESSURE: 134 MMHG

## 2025-03-28 DIAGNOSIS — K92.1 HEMATOCHEZIA: ICD-10-CM

## 2025-03-28 DIAGNOSIS — N20.1 LEFT URETERAL STONE: ICD-10-CM

## 2025-03-28 DIAGNOSIS — R31.9 HEMATURIA, UNSPECIFIED TYPE: Primary | ICD-10-CM

## 2025-03-28 DIAGNOSIS — N20.0 NEPHROLITHIASIS: ICD-10-CM

## 2025-03-28 DIAGNOSIS — N20.1 LEFT URETERAL STONE: Primary | ICD-10-CM

## 2025-03-28 LAB
BILIRUB BLD-MCNC: NEGATIVE MG/DL
CLARITY, POC: CLEAR
COLOR UR: ABNORMAL
EXPIRATION DATE: ABNORMAL
GLUCOSE UR STRIP-MCNC: ABNORMAL MG/DL
KETONES UR QL: NEGATIVE
LEUKOCYTE EST, POC: NEGATIVE
Lab: ABNORMAL
NITRITE UR-MCNC: NEGATIVE MG/ML
PH UR: 6 [PH] (ref 5–8)
PROT UR STRIP-MCNC: NEGATIVE MG/DL
RBC # UR STRIP: ABNORMAL /UL
SP GR UR: 1.03 (ref 1–1.03)
UROBILINOGEN UR QL: NORMAL

## 2025-03-28 PROCEDURE — 74018 RADEX ABDOMEN 1 VIEW: CPT

## 2025-03-28 NOTE — PROGRESS NOTES
"Chief Complaint:    Chief Complaint   Patient presents with    Blood in Urine    Flank Pain       Vital Signs:   /81 (BP Location: Left arm, Patient Position: Sitting)   Pulse 63   Ht 177.8 cm (70\")   Wt 112 kg (246 lb)   BMI 35.30 kg/m²   Body mass index is 35.3 kg/m².      HPI:  Alek Munoz is a 54 y.o. male who presents today for follow up    History of Present Illness  Mr. Munoz returns to the clinic today for evaluation of gross hematuria/flank pain.  He does have a past medical history significant for nephrolithiasis and is underwent previous ureteroscopy's with laser lithotripsies by Dr. Adkins.  He had a recent CT scan abdomen pelvis completed in November 2024 that showed a bladder stone that he had recently passed as well as a punctate right UVJ calculus.  There were other nonobstructing stones in the right kidney.  He returns today and states that he has had intermittent blood in his urine over the past week.  He also endorsed some rectal bleeding and went to the emergency department on 3/10/2025 for evaluation however reports after waiting for several hours he was not seen and just left.  He had no workup completed.  He reports he had blood in his urine yesterday but this is improving.  States it was dark black in color this morning.  Urine in office today is yellow in color with no obvious blood clots or blood noted.  Microscopic urine analysis showed 3+ blood and 3+ glucose.  He reports his hematochezia has resolved.  He does endorse a history of polyps removed by colonoscopy 10 years ago and has not had repeat colonoscopy since.  He does have an appointment with GI in July.  KUB completed prior to office visit today was unremarkable other than right sided calcifications consistent for nonobstructing kidney stones.  Blood in Urine  Irritative symptoms do not include frequency or urgency. Associated symptoms include abdominal pain and flank pain. Pertinent negatives include no chills, " dysuria, fever, nausea or vomiting.   Flank Pain  Associated symptoms include abdominal pain. Pertinent negatives include no chest pain, dysuria, fever or headaches.       Past Medical History:  Past Medical History:   Diagnosis Date    Back pain     Cancer     PAROTID CANCER    Degenerative joint disease     Diabetes mellitus     DVT, lower extremity     left 2013    GERD (gastroesophageal reflux disease)     Kidney stones     Neuropathy     FEET     Peroneal DVT (deep venous thrombosis)     in 1998 and dvt 2010    Pulmonary embolism     Spinal stenosis     Urolithiasis     Vertigo     OCASSIONALLY       Current Meds:  Current Outpatient Medications   Medication Sig Dispense Refill    bisoprolol-hydrochlorothiazide (ZIAC) 5-6.25 MG per tablet Take 1 tablet by mouth Daily. as directed      cyclobenzaprine (FLEXERIL) 10 MG tablet Take 1 tablet by mouth 3 (Three) Times a Day As Needed for Muscle Spasms.      Insulin Glargine (BASAGLAR KWIKPEN SC) Inject 55 Units under the skin into the appropriate area as directed Every Evening.      meclizine (ANTIVERT) 25 MG tablet Take 1 tablet by mouth 3 (Three) Times a Day As Needed for Dizziness. 20 tablet 0    mometasone (Nasonex) 50 MCG/ACT nasal spray 2 sprays into the nostril(s) as directed by provider Daily. 17 g 0    omeprazole (PriLOSEC) 20 MG capsule       ondansetron ODT (ZOFRAN-ODT) 4 MG disintegrating tablet Place 1 tablet on the tongue Every 6 (Six) Hours As Needed for Nausea or Vomiting. 10 tablet 0    oxyCODONE-acetaminophen (PERCOCET)  MG per tablet Take 1 tablet by mouth Every 6 (Six) Hours As Needed.      OxyCONTIN 10 MG 12 hr tablet TAKE ONE TABLET BY MOUTH EVERY NIGHT AT BEDTIME AS NEEDED FOR PAIN *DNF 04/18/23*      Ozempic, 1 MG/DOSE, 4 MG/3ML solution pen-injector INJECT 1MG UNDER THE SKIN EVERY WEEK AS DIRECTED      pantoprazole (PROTONIX) 20 MG EC tablet Take 1 tablet by mouth Daily.      phentermine 37.5 MG capsule Take 1 capsule by mouth Every  "Morning.      Syringe 25G X 58\" 3 ML misc Use as directed 2 x weekly 24 each 3    tamsulosin (FLOMAX) 0.4 MG capsule 24 hr capsule Take 1 capsule by mouth Daily. 14 capsule 0    Testosterone Cypionate (Depo-Testosterone) 200 MG/ML injection Inject 1/2 cc subcutaneously every Monday and Thursday 10 mL 2    Tirzepatide (Mounjaro) 10 MG/0.5ML solution pen-injector pen Inject 0.5 mL under the skin into the appropriate area as directed 1 (One) Time Per Week.      tiZANidine (ZANAFLEX) 4 MG tablet Take 1 tablet by mouth At Night As Needed for Muscle Spasms.      tamsulosin (Flomax) 0.4 MG capsule 24 hr capsule Take 1 capsule by mouth Every Night. (Patient not taking: Reported on 3/28/2025) 30 capsule 5     No current facility-administered medications for this visit.        Allergies:   Allergies   Allergen Reactions    Morphine Seizure        Past Surgical History:  Past Surgical History:   Procedure Laterality Date    APPENDECTOMY      COLONOSCOPY      CYSTOSCOPY W/ LITHOLAPAXY / EHL      INNER EAR SURGERY      OTHER SURGICAL HISTORY      Mass removed from left side of face    SKIN BIOPSY      URETEROSCOPY LASER LITHOTRIPSY WITH STENT INSERTION Left 3/1/2024    Procedure: URETEROSCOPY LASER LITHOTRIPSY;  Surgeon: Angel Adkins MD;  Location: [facility];  Service: Urology;  Laterality: Left;    VASECTOMY Bilateral 2020    Procedure: VASECTOMY;  Surgeon: Angel Adkins MD;  Location: [facility];  Service: Urology;  Laterality: Bilateral;       Social History:  Social History     Socioeconomic History    Marital status:    Tobacco Use    Smoking status: Former     Current packs/day: 0.00     Types: Cigarettes     Start date:      Quit date:      Years since quittin.2    Smokeless tobacco: Former     Types: Chew     Quit date:     Tobacco comments:     occasionally uses chew once a year    Vaping Use    Vaping status: Never Used   Substance and Sexual Activity    Alcohol use: " No     Comment: beer occasionally    Drug use: No    Sexual activity: Defer       Family History:  Family History   Problem Relation Age of Onset    Other Other         deveral siblings tatyana had early onset DVTs    No Known Problems Mother     No Known Problems Father        Review of Systems:  Review of Systems   Constitutional:  Negative for chills, fatigue, fever and unexpected weight change.   Respiratory:  Positive for shortness of breath. Negative for cough, chest tightness and wheezing.    Cardiovascular:  Negative for chest pain and leg swelling.   Gastrointestinal:  Positive for abdominal pain. Negative for anal bleeding, constipation, diarrhea, nausea and vomiting.   Genitourinary:  Positive for flank pain and hematuria. Negative for difficulty urinating, dysuria, frequency, penile swelling, scrotal swelling, testicular pain and urgency.   Musculoskeletal:  Negative for back pain and joint swelling.   Skin:  Negative for rash.   Neurological:  Negative for dizziness and headaches.   Psychiatric/Behavioral:  Negative for confusion and suicidal ideas.        Physical Exam:  Physical Exam  Constitutional:       General: He is not in acute distress.     Appearance: Normal appearance.   HENT:      Head: Normocephalic and atraumatic.      Nose: Nose normal.      Mouth/Throat:      Mouth: Mucous membranes are moist.   Eyes:      Conjunctiva/sclera: Conjunctivae normal.   Cardiovascular:      Rate and Rhythm: Normal rate.      Pulses: Normal pulses.   Pulmonary:      Effort: Pulmonary effort is normal.   Abdominal:      Palpations: Abdomen is soft.   Musculoskeletal:         General: Normal range of motion.      Cervical back: Normal range of motion.   Skin:     General: Skin is warm.   Neurological:      General: No focal deficit present.      Mental Status: He is alert and oriented to person, place, and time.   Psychiatric:         Mood and Affect: Mood normal.         Behavior: Behavior normal.         Thought  Content: Thought content normal.         Judgment: Judgment normal.         Recent Image (CT and/or KUB):   CT Abdomen and Pelvis: No results found for this or any previous visit.     CT Stone Protocol: Results for orders placed during the hospital encounter of 11/28/24    CT Abdomen Pelvis Stone Protocol    Narrative  Procedure: CT of the abdomen and pelvis performed without IV contrast on  November 28, 2024. The examination was performed with 4 mm axial imaging  and 4 mm sagittal and coronal reconstruction images. Total DLP 1245. The  examination was performed according to as low as reasonably achievable  dose protocol.    HISTORY: Flank pain on the right side.    COMPARISON: None.    FINDINGS:    Normal heart size. No pericardial effusion.  Old healed left posterior lateral seventh rib fracture.  Old prior left posterior lateral sixth rib fracture.  No acute process seen in the liver, gallbladder, spleen, pancreas,  adrenal glands, and abdominal aorta.  There is a 2 mm stone in the lower pole of the right kidney seen best on  image 55, series 2.  Minimal stranding around the right and left kidney.  3 mm stone at the right UVJ with associated partial right renal  obstruction.  The stone is seen best on image 108, series 2.  Slight prominence of the prostate gland size.  Normal appendix is well seen.  No abdominal aortic aneurysm or retroperitoneal hemorrhage.  No free fluid or free air. No abscess or hematoma.  Small right and left inguinal hernias contain only fat.  Mild osteoarthritis at the right and left hip joint.    Impression  1.  There is a 3 mm stone at the right UVJ with associated partial right  renal obstruction.  2.  There is a 2 mm stone at the lower pole of the right kidney.  3.  Old posterior lateral sixth and seventh rib fractures.  4.  Left lower lobe calcified granuloma.  5.  Normal appendix.  6.  Mild enlarged prostate gland.  7.  No free fluid or free air.  8.  No abscess or hematoma.  9.   Degenerative disc disease in the lumbar spine with endplate and  facet hypertrophic changes.      This report was finalized on 11/28/2024 6:57 AM by Arnol Tee MD.     KUB: Results for orders placed during the hospital encounter of 03/28/25    XR Abdomen KUB    Narrative  EXAM:  XR Abdomen, 1 View    EXAM DATE:  3/28/2025 1:18 PM    CLINICAL HISTORY:  flank pain; N20.1-Calculus of ureter    TECHNIQUE:  Frontal supine view of the abdomen/pelvis.    COMPARISON:  No relevant prior studies available.    FINDINGS:  Gastrointestinal tract:  Unremarkable as visualized.  No dilation.  Bones/joints:  Unremarkable as visualized.  No acute fracture.    Impression  Unremarkable abdominal x-ray.    This report was finalized on 3/28/2025 1:18 PM by Dr. Cristhian Norman MD.       Labs:  Brief Urine Lab Results  (Last result in the past 365 days)        Color   Clarity   Blood   Leuk Est   Nitrite   Protein   CREAT   Urine HCG        03/28/25 1332 Dark Yellow   Clear   3+   Negative   Negative   Negative                 Office Visit on 03/28/2025   Component Date Value Ref Range Status    Color 03/28/2025 Dark Yellow  Yellow, Straw, Dark Yellow, Huma Final    Clarity, UA 03/28/2025 Clear  Clear Final    Specific Gravity  03/28/2025 1.030  1.005 - 1.030 Final    pH, Urine 03/28/2025 6.0  5.0 - 8.0 Final    Leukocytes 03/28/2025 Negative  Negative Final    Nitrite, UA 03/28/2025 Negative  Negative Final    Protein, POC 03/28/2025 Negative  Negative mg/dL Final    Glucose, UA 03/28/2025 500 mg/dL (A)  Negative mg/dL Final    Ketones, UA 03/28/2025 Negative  Negative Final    Urobilinogen, UA 03/28/2025 Normal  Normal, 0.2 E.U./dL Final    Bilirubin 03/28/2025 Negative  Negative Final    Blood, UA 03/28/2025 3+ (A)  Negative Final    Lot Number 03/28/2025 [id number]   Final    Expiration Date 03/28/2025 52,026   Final   Admission on 03/10/2025, Discharged on 03/11/2025   Component Date Value Ref Range Status    Glucose 03/10/2025  232 (H)  65 - 99 mg/dL Final    BUN 03/10/2025 15  6 - 20 mg/dL Final    Creatinine 03/10/2025 1.18  0.76 - 1.27 mg/dL Final    Sodium 03/10/2025 137  136 - 145 mmol/L Final    Potassium 03/10/2025 4.3  3.5 - 5.2 mmol/L Final    Specimen hemolyzed.  Result may be falsely elevated.    Chloride 03/10/2025 101  98 - 107 mmol/L Final    CO2 03/10/2025 25.5  22.0 - 29.0 mmol/L Final    Calcium 03/10/2025 9.4  8.6 - 10.5 mg/dL Final    Total Protein 03/10/2025 7.1  6.0 - 8.5 g/dL Final    Albumin 03/10/2025 3.8  3.5 - 5.2 g/dL Final    ALT (SGPT) 03/10/2025 52 (H)  1 - 41 U/L Final    Specimen hemolyzed.  Result may  be falsely elevated.    AST (SGOT) 03/10/2025 38  1 - 40 U/L Final    Alkaline Phosphatase 03/10/2025 99  39 - 117 U/L Final    Total Bilirubin 03/10/2025 0.2  0.0 - 1.2 mg/dL Final    Globulin 03/10/2025 3.3  gm/dL Final    A/G Ratio 03/10/2025 1.2  g/dL Final    BUN/Creatinine Ratio 03/10/2025 12.7  7.0 - 25.0 Final    Anion Gap 03/10/2025 10.5  5.0 - 15.0 mmol/L Final    eGFR 03/10/2025 74.2  >60.0 mL/min/1.73 Final    ABO Type 03/10/2025 O   Final    RH type 03/10/2025 Positive   Final    Antibody Screen 03/10/2025 Negative   Final    T&S Expiration Date 03/10/2025 3/13/2025 11:59:59 PM   Final    Lactate 03/10/2025 2.6 (C)  0.5 - 2.0 mmol/L Final    Protime 03/10/2025 14.2  11.6 - 15.1 Seconds Final    INR 03/10/2025 1.09  0.90 - 1.10 Final    Extra Tube 03/10/2025 Hold for add-ons.   Final    Auto resulted.    Extra Tube 03/10/2025 hold for add-on   Final    Auto resulted    Extra Tube 03/10/2025 Hold for add-ons.   Final    Auto resulted.    Extra Tube 03/10/2025 Hold for add-ons.   Final    Auto resulted    WBC 03/10/2025 7.45  3.40 - 10.80 10*3/mm3 Final    RBC 03/10/2025 5.62  4.14 - 5.80 10*6/mm3 Final    Hemoglobin 03/10/2025 16.8  13.0 - 17.7 g/dL Final    Hematocrit 03/10/2025 48.6  37.5 - 51.0 % Final    MCV 03/10/2025 86.5  79.0 - 97.0 fL Final    MCH 03/10/2025 29.9  26.6 - 33.0 pg Final     MCHC 03/10/2025 34.6  31.5 - 35.7 g/dL Final    RDW 03/10/2025 12.3  12.3 - 15.4 % Final    RDW-SD 03/10/2025 38.9  37.0 - 54.0 fl Final    MPV 03/10/2025 9.4  6.0 - 12.0 fL Final    Platelets 03/10/2025 200  140 - 450 10*3/mm3 Final    Neutrophil % 03/10/2025 64.4  42.7 - 76.0 % Final    Lymphocyte % 03/10/2025 26.8  19.6 - 45.3 % Final    Monocyte % 03/10/2025 6.7  5.0 - 12.0 % Final    Eosinophil % 03/10/2025 1.5  0.3 - 6.2 % Final    Basophil % 03/10/2025 0.5  0.0 - 1.5 % Final    Immature Grans % 03/10/2025 0.1  0.0 - 0.5 % Final    Neutrophils, Absolute 03/10/2025 4.79  1.70 - 7.00 10*3/mm3 Final    Lymphocytes, Absolute 03/10/2025 2.00  0.70 - 3.10 10*3/mm3 Final    Monocytes, Absolute 03/10/2025 0.50  0.10 - 0.90 10*3/mm3 Final    Eosinophils, Absolute 03/10/2025 0.11  0.00 - 0.40 10*3/mm3 Final    Basophils, Absolute 03/10/2025 0.04  0.00 - 0.20 10*3/mm3 Final    Immature Grans, Absolute 03/10/2025 0.01  0.00 - 0.05 10*3/mm3 Final    nRBC 03/10/2025 0.0  0.0 - 0.2 /100 WBC Final    ABO Type 03/10/2025 O   Final    RH type 03/10/2025 Positive   Final        Procedure: None  Procedures     I have reviewed and agree with the above PMH, PSH, FMH, social history, medications, allergies, and labs.     Assessment/Plan:   Problem List Items Addressed This Visit    None  Visit Diagnoses         Hematuria, unspecified type    -  Primary    Relevant Orders    POC Urinalysis Dipstick, Automated (Completed)      Nephrolithiasis          Hematochezia                Health Maintenance:   Health Maintenance Due   Topic Date Due    DIABETIC FOOT EXAM  Never done    DIABETIC EYE EXAM  Never done    URINE MICROALBUMIN-CREATININE RATIO (uACR)  Never done    Hepatitis B (1 of 3 - 19+ 3-dose series) Never done    Pneumococcal Vaccine 50+ (1 of 2 - PCV) Never done    HEPATITIS C SCREENING  Never done    ANNUAL PHYSICAL  Never done    HEMOGLOBIN A1C  10/23/2020    ZOSTER VACCINE (1 of 2) Never done    TDAP/TD VACCINES (3 - Td or  Tdap) 08/14/2023    INFLUENZA VACCINE  Never done    COLORECTAL CANCER SCREENING  07/16/2024    COVID-19 Vaccine (1 - 2024-25 season) Never done        Smoking Counseling: Former smoker.  Former user of smokeless tobacco.  Counseling given.    Urine Incontinence: Patient reports that he is not currently experiencing any symptoms of urinary incontinence.    Patient was given instructions and counseling regarding his condition or for health maintenance advice. Please see specific information pulled into the AVS if appropriate.    Patient Education:   Nephrolithiasis/hematuria -patient's hematuria is improving and flank pain is improving.  Did advise him he may have passed a small kidney stone.  Recommending continue with Flomax 0.4 mg as needed for passage of the kidney stones.  KUB was unremarkable other than a nonobstructing right stone.  We recommend close observation.  Did advise patient if his symptoms worsen that we will proceed forward with a CT scan of the abdomen pelvis stone protocol.  He verbalized understanding.  Hematochezia -discussed with the patient the pathophysiology of this condition in detail.  I did advise given patient's history of polyps would recommend to follow-up with gastroenterology for repeat colonoscopy.  Patient verbalized understanding.  Otherwise we will place him back in 2 to 3 months for repeat PSA.     Visit Diagnoses:    ICD-10-CM ICD-9-CM   1. Hematuria, unspecified type  R31.9 599.70   2. Nephrolithiasis  N20.0 592.0   3. Hematochezia  K92.1 578.1     A total of 30 minutes were spent coordinating this patient’s care in clinic today; 20 minutes of which were face-to-face with the patient, reviewing medical history and counseling on the current treatment and followup plan.  All questions were answered to patient's satisfaction.    Meds Ordered During Visit:  No orders of the defined types were placed in this encounter.      Follow Up Appointment: 2 to 3 months  No follow-ups on  file.      This document has been electronically signed by Juaquin Orozco PA-C   March 28, 2025 15:27 EDT    Part of this note may be an electronic transcription/translation of spoken language to printed text using the Dragon Dictation System.

## 2025-04-11 ENCOUNTER — TRANSCRIBE ORDERS (OUTPATIENT)
Dept: ADMINISTRATIVE | Facility: HOSPITAL | Age: 55
End: 2025-04-11
Payer: MEDICAID

## 2025-04-11 DIAGNOSIS — R60.0 LOCALIZED EDEMA: Primary | ICD-10-CM

## 2025-04-14 ENCOUNTER — TRANSCRIBE ORDERS (OUTPATIENT)
Dept: ADMINISTRATIVE | Facility: HOSPITAL | Age: 55
End: 2025-04-14
Payer: MEDICAID

## 2025-04-14 DIAGNOSIS — R31.0 GROSS HEMATURIA: Primary | ICD-10-CM

## 2025-04-17 ENCOUNTER — HOSPITAL ENCOUNTER (OUTPATIENT)
Dept: ULTRASOUND IMAGING | Facility: HOSPITAL | Age: 55
Discharge: HOME | End: 2025-04-17
Admitting: PHYSICIAN ASSISTANT
Payer: MEDICAID

## 2025-04-17 DIAGNOSIS — R60.0 LOCALIZED EDEMA: ICD-10-CM

## 2025-04-17 PROCEDURE — 93971 EXTREMITY STUDY: CPT

## 2025-04-17 PROCEDURE — 93971 EXTREMITY STUDY: CPT | Performed by: RADIOLOGY

## 2025-04-24 ENCOUNTER — HOSPITAL ENCOUNTER (OUTPATIENT)
Dept: CT IMAGING | Facility: HOSPITAL | Age: 55
Discharge: HOME | End: 2025-04-24
Admitting: PHYSICIAN ASSISTANT
Payer: MEDICAID

## 2025-04-24 DIAGNOSIS — R31.0 GROSS HEMATURIA: ICD-10-CM

## 2025-04-24 PROCEDURE — 74176 CT ABD & PELVIS W/O CONTRAST: CPT | Performed by: RADIOLOGY

## 2025-04-24 PROCEDURE — 74176 CT ABD & PELVIS W/O CONTRAST: CPT

## 2025-05-07 ENCOUNTER — APPOINTMENT (OUTPATIENT)
Dept: CT IMAGING | Facility: HOSPITAL | Age: 55
End: 2025-05-07
Payer: MEDICAID

## 2025-05-07 ENCOUNTER — HOSPITAL ENCOUNTER (EMERGENCY)
Facility: HOSPITAL | Age: 55
Discharge: HOME | End: 2025-05-08
Attending: STUDENT IN AN ORGANIZED HEALTH CARE EDUCATION/TRAINING PROGRAM
Payer: MEDICAID

## 2025-05-07 DIAGNOSIS — N20.0 RIGHT KIDNEY STONE: Primary | ICD-10-CM

## 2025-05-07 DIAGNOSIS — N40.0 ENLARGED PROSTATE: ICD-10-CM

## 2025-05-07 LAB
ALBUMIN SERPL-MCNC: 3.9 G/DL (ref 3.5–5.2)
ALBUMIN/GLOB SERPL: 1.3 G/DL
ALP SERPL-CCNC: 89 U/L (ref 39–117)
ALT SERPL W P-5'-P-CCNC: 75 U/L (ref 1–41)
ANION GAP SERPL CALCULATED.3IONS-SCNC: 10.1 MMOL/L (ref 5–15)
AST SERPL-CCNC: 68 U/L (ref 1–40)
BACTERIA UR QL AUTO: ABNORMAL /HPF
BASOPHILS # BLD AUTO: 0.02 10*3/MM3 (ref 0–0.2)
BASOPHILS NFR BLD AUTO: 0.4 % (ref 0–1.5)
BILIRUB SERPL-MCNC: 0.3 MG/DL (ref 0–1.2)
BILIRUB UR QL STRIP: NEGATIVE
BUN SERPL-MCNC: 9 MG/DL (ref 6–20)
BUN/CREAT SERPL: 9.2 (ref 7–25)
CALCIUM SPEC-SCNC: 9.3 MG/DL (ref 8.6–10.5)
CHLORIDE SERPL-SCNC: 99 MMOL/L (ref 98–107)
CLARITY UR: CLEAR
CO2 SERPL-SCNC: 25.9 MMOL/L (ref 22–29)
COLOR UR: YELLOW
CREAT SERPL-MCNC: 0.98 MG/DL (ref 0.76–1.27)
CRP SERPL-MCNC: 0.56 MG/DL (ref 0–0.5)
DEPRECATED RDW RBC AUTO: 36.5 FL (ref 37–54)
EGFRCR SERPLBLD CKD-EPI 2021: 92.8 ML/MIN/1.73
EOSINOPHIL # BLD AUTO: 0.13 10*3/MM3 (ref 0–0.4)
EOSINOPHIL NFR BLD AUTO: 2.3 % (ref 0.3–6.2)
ERYTHROCYTE [DISTWIDTH] IN BLOOD BY AUTOMATED COUNT: 12.1 % (ref 12.3–15.4)
GLOBULIN UR ELPH-MCNC: 3.1 GM/DL
GLUCOSE SERPL-MCNC: 226 MG/DL (ref 65–99)
GLUCOSE UR STRIP-MCNC: ABNORMAL MG/DL
HCT VFR BLD AUTO: 43.4 % (ref 37.5–51)
HGB BLD-MCNC: 14.8 G/DL (ref 13–17.7)
HGB UR QL STRIP.AUTO: ABNORMAL
HOLD SPECIMEN: NORMAL
HOLD SPECIMEN: NORMAL
HYALINE CASTS UR QL AUTO: ABNORMAL /LPF
IMM GRANULOCYTES # BLD AUTO: 0.02 10*3/MM3 (ref 0–0.05)
IMM GRANULOCYTES NFR BLD AUTO: 0.4 % (ref 0–0.5)
KETONES UR QL STRIP: ABNORMAL
LEUKOCYTE ESTERASE UR QL STRIP.AUTO: NEGATIVE
LYMPHOCYTES # BLD AUTO: 1.81 10*3/MM3 (ref 0.7–3.1)
LYMPHOCYTES NFR BLD AUTO: 32.1 % (ref 19.6–45.3)
MCH RBC QN AUTO: 28.8 PG (ref 26.6–33)
MCHC RBC AUTO-ENTMCNC: 34.1 G/DL (ref 31.5–35.7)
MCV RBC AUTO: 84.4 FL (ref 79–97)
MONOCYTES # BLD AUTO: 0.37 10*3/MM3 (ref 0.1–0.9)
MONOCYTES NFR BLD AUTO: 6.6 % (ref 5–12)
NEUTROPHILS NFR BLD AUTO: 3.28 10*3/MM3 (ref 1.7–7)
NEUTROPHILS NFR BLD AUTO: 58.2 % (ref 42.7–76)
NITRITE UR QL STRIP: NEGATIVE
NRBC BLD AUTO-RTO: 0 /100 WBC (ref 0–0.2)
PH UR STRIP.AUTO: 5.5 [PH] (ref 5–8)
PLATELET # BLD AUTO: 165 10*3/MM3 (ref 140–450)
PMV BLD AUTO: 9.5 FL (ref 6–12)
POTASSIUM SERPL-SCNC: 4.3 MMOL/L (ref 3.5–5.2)
PROT SERPL-MCNC: 7 G/DL (ref 6–8.5)
PROT UR QL STRIP: ABNORMAL
RBC # BLD AUTO: 5.14 10*6/MM3 (ref 4.14–5.8)
RBC # UR STRIP: ABNORMAL /HPF
REF LAB TEST METHOD: ABNORMAL
SODIUM SERPL-SCNC: 135 MMOL/L (ref 136–145)
SP GR UR STRIP: 1.02 (ref 1–1.03)
SQUAMOUS #/AREA URNS HPF: ABNORMAL /HPF
UROBILINOGEN UR QL STRIP: ABNORMAL
WBC # UR STRIP: ABNORMAL /HPF
WBC NRBC COR # BLD AUTO: 5.63 10*3/MM3 (ref 3.4–10.8)
WHOLE BLOOD HOLD COAG: NORMAL
WHOLE BLOOD HOLD SPECIMEN: NORMAL

## 2025-05-07 PROCEDURE — 36415 COLL VENOUS BLD VENIPUNCTURE: CPT | Performed by: PHYSICIAN ASSISTANT

## 2025-05-07 PROCEDURE — 86140 C-REACTIVE PROTEIN: CPT | Performed by: PHYSICIAN ASSISTANT

## 2025-05-07 PROCEDURE — 80053 COMPREHEN METABOLIC PANEL: CPT | Performed by: PHYSICIAN ASSISTANT

## 2025-05-07 PROCEDURE — 25810000003 SODIUM CHLORIDE 0.9 % SOLUTION: Performed by: PHYSICIAN ASSISTANT

## 2025-05-07 PROCEDURE — 25010000002 ONDANSETRON PER 1 MG: Performed by: PHYSICIAN ASSISTANT

## 2025-05-07 PROCEDURE — 25510000001 IOPAMIDOL 61 % SOLUTION: Performed by: EMERGENCY MEDICINE

## 2025-05-07 PROCEDURE — 36415 COLL VENOUS BLD VENIPUNCTURE: CPT

## 2025-05-07 PROCEDURE — 81001 URINALYSIS AUTO W/SCOPE: CPT | Performed by: PHYSICIAN ASSISTANT

## 2025-05-07 PROCEDURE — 25010000002 KETOROLAC TROMETHAMINE PER 15 MG: Performed by: PHYSICIAN ASSISTANT

## 2025-05-07 PROCEDURE — 96374 THER/PROPH/DIAG INJ IV PUSH: CPT

## 2025-05-07 PROCEDURE — 85025 COMPLETE CBC W/AUTO DIFF WBC: CPT | Performed by: PHYSICIAN ASSISTANT

## 2025-05-07 PROCEDURE — 99285 EMERGENCY DEPT VISIT HI MDM: CPT

## 2025-05-07 PROCEDURE — 74177 CT ABD & PELVIS W/CONTRAST: CPT

## 2025-05-07 PROCEDURE — 96375 TX/PRO/DX INJ NEW DRUG ADDON: CPT

## 2025-05-07 RX ORDER — IOPAMIDOL 612 MG/ML
100 INJECTION, SOLUTION INTRAVASCULAR
Status: COMPLETED | OUTPATIENT
Start: 2025-05-07 | End: 2025-05-07

## 2025-05-07 RX ORDER — ONDANSETRON 2 MG/ML
4 INJECTION INTRAMUSCULAR; INTRAVENOUS ONCE
Status: COMPLETED | OUTPATIENT
Start: 2025-05-07 | End: 2025-05-07

## 2025-05-07 RX ORDER — KETOROLAC TROMETHAMINE 30 MG/ML
30 INJECTION, SOLUTION INTRAMUSCULAR; INTRAVENOUS ONCE
Status: COMPLETED | OUTPATIENT
Start: 2025-05-07 | End: 2025-05-07

## 2025-05-07 RX ADMIN — SODIUM CHLORIDE 1000 ML: 9 INJECTION, SOLUTION INTRAVENOUS at 22:37

## 2025-05-07 RX ADMIN — ONDANSETRON 4 MG: 2 INJECTION INTRAMUSCULAR; INTRAVENOUS at 22:36

## 2025-05-07 RX ADMIN — KETOROLAC TROMETHAMINE 30 MG: 30 INJECTION, SOLUTION INTRAMUSCULAR; INTRAVENOUS at 22:37

## 2025-05-07 RX ADMIN — IOPAMIDOL 80 ML: 612 INJECTION, SOLUTION INTRAVENOUS at 23:14

## 2025-05-08 VITALS
RESPIRATION RATE: 18 BRPM | BODY MASS INDEX: 44.43 KG/M2 | WEIGHT: 300 LBS | OXYGEN SATURATION: 100 % | HEART RATE: 61 BPM | HEIGHT: 69 IN | DIASTOLIC BLOOD PRESSURE: 76 MMHG | TEMPERATURE: 98.7 F | SYSTOLIC BLOOD PRESSURE: 133 MMHG

## 2025-05-08 PROCEDURE — 74177 CT ABD & PELVIS W/CONTRAST: CPT | Performed by: RADIOLOGY

## 2025-05-08 PROCEDURE — 25010000002 KETOROLAC TROMETHAMINE PER 15 MG: Performed by: PHYSICIAN ASSISTANT

## 2025-05-08 PROCEDURE — 96376 TX/PRO/DX INJ SAME DRUG ADON: CPT

## 2025-05-08 RX ORDER — KETOROLAC TROMETHAMINE 30 MG/ML
15 INJECTION, SOLUTION INTRAMUSCULAR; INTRAVENOUS ONCE
Status: COMPLETED | OUTPATIENT
Start: 2025-05-08 | End: 2025-05-08

## 2025-05-08 RX ORDER — KETOROLAC TROMETHAMINE 10 MG/1
10 TABLET, FILM COATED ORAL EVERY 6 HOURS PRN
Qty: 20 TABLET | Refills: 0 | Status: SHIPPED | OUTPATIENT
Start: 2025-05-08 | End: 2025-05-13

## 2025-05-08 RX ADMIN — KETOROLAC TROMETHAMINE 15 MG: 30 INJECTION, SOLUTION INTRAMUSCULAR; INTRAVENOUS at 01:25

## 2025-05-08 NOTE — ED PROVIDER NOTES
Subjective   History of Present Illness  This is a 54 year old male patient who presents to the ER with chief complaint of flank pain. PMH significant for DM requiring insulin (which he hasn't had for several months due to insurance issues), HTN, HLD, GERD, kidney stones. For several months, the patient has had worsening bilateral flank pain radiating into his bilateral hips and bilateral low back. He has also had hematuria and nausea without vomiting.       Review of Systems   Constitutional: Negative.  Negative for fever.   HENT: Negative.     Respiratory: Negative.     Cardiovascular: Negative.  Negative for chest pain.   Gastrointestinal:  Positive for nausea. Negative for abdominal distention, abdominal pain, anal bleeding, blood in stool, constipation, diarrhea, rectal pain and vomiting.   Endocrine: Negative.    Genitourinary:  Positive for flank pain and hematuria. Negative for decreased urine volume, difficulty urinating, dysuria, enuresis, frequency, genital sores, penile discharge, penile pain, penile swelling, scrotal swelling, testicular pain and urgency.   Musculoskeletal:  Positive for back pain. Negative for arthralgias, gait problem, joint swelling, myalgias, neck pain and neck stiffness.   Skin: Negative.    Neurological: Negative.    Psychiatric/Behavioral: Negative.     All other systems reviewed and are negative.      Past Medical History:   Diagnosis Date    Back pain     Cancer     PAROTID CANCER    Degenerative joint disease     Diabetes mellitus     DVT, lower extremity     left 2013    GERD (gastroesophageal reflux disease)     Kidney stones     Neuropathy     FEET     Peroneal DVT (deep venous thrombosis)     in 1998 and dvt 2010    Pulmonary embolism     Spinal stenosis     Urolithiasis     Vertigo     OCASSIONALLY       Allergies   Allergen Reactions    Morphine Seizure       Past Surgical History:   Procedure Laterality Date    APPENDECTOMY      COLONOSCOPY      CYSTOSCOPY W/  LITHOLAPAXY / EHL      INNER EAR SURGERY      OTHER SURGICAL HISTORY      Mass removed from left side of face    SKIN BIOPSY      URETEROSCOPY LASER LITHOTRIPSY WITH STENT INSERTION Left 3/1/2024    Procedure: URETEROSCOPY LASER LITHOTRIPSY;  Surgeon: Angel Adkins MD;  Location: [facility];  Service: Urology;  Laterality: Left;    VASECTOMY Bilateral 2020    Procedure: VASECTOMY;  Surgeon: Angel Adkins MD;  Location: [facility];  Service: Urology;  Laterality: Bilateral;       Family History   Problem Relation Age of Onset    Other Other         deveral siblings havd had early onset DVTs    No Known Problems Mother     No Known Problems Father        Social History     Socioeconomic History    Marital status:    Tobacco Use    Smoking status: Former     Current packs/day: 0.00     Types: Cigarettes     Start date:      Quit date:      Years since quittin.3    Smokeless tobacco: Former     Types: Chew     Quit date:     Tobacco comments:     occasionally uses chew once a year    Vaping Use    Vaping status: Never Used   Substance and Sexual Activity    Alcohol use: No     Comment: beer occasionally    Drug use: No    Sexual activity: Defer           Objective   Physical Exam  Vitals and nursing note reviewed.   Constitutional:       General: He is not in acute distress.     Appearance: He is well-developed. He is not diaphoretic.   HENT:      Head: Normocephalic and atraumatic.      Right Ear: External ear normal.      Left Ear: External ear normal.      Nose: Nose normal.   Eyes:      Conjunctiva/sclera: Conjunctivae normal.      Pupils: Pupils are equal, round, and reactive to light.   Neck:      Vascular: No JVD.      Trachea: No tracheal deviation.   Cardiovascular:      Rate and Rhythm: Normal rate and regular rhythm.      Heart sounds: Normal heart sounds. No murmur heard.  Pulmonary:      Effort: Pulmonary effort is normal. No respiratory distress.      Breath  sounds: Normal breath sounds. No wheezing.   Abdominal:      General: Bowel sounds are normal.      Palpations: Abdomen is soft.      Tenderness: There is no abdominal tenderness.   Musculoskeletal:         General: No deformity. Normal range of motion.      Cervical back: Normal range of motion and neck supple.   Skin:     General: Skin is warm and dry.      Coloration: Skin is not pale.      Findings: No erythema or rash.   Neurological:      Mental Status: He is alert and oriented to person, place, and time.      Cranial Nerves: No cranial nerve deficit.   Psychiatric:         Behavior: Behavior normal.         Thought Content: Thought content normal.         Procedures  Results for orders placed or performed during the hospital encounter of 05/07/25   Comprehensive Metabolic Panel    Collection Time: 05/07/25  9:06 PM    Specimen: Blood   Result Value Ref Range    Glucose 226 (H) 65 - 99 mg/dL    BUN 9 6 - 20 mg/dL    Creatinine 0.98 0.76 - 1.27 mg/dL    Sodium 135 (L) 136 - 145 mmol/L    Potassium 4.3 3.5 - 5.2 mmol/L    Chloride 99 98 - 107 mmol/L    CO2 25.9 22.0 - 29.0 mmol/L    Calcium 9.3 8.6 - 10.5 mg/dL    Total Protein 7.0 6.0 - 8.5 g/dL    Albumin 3.9 3.5 - 5.2 g/dL    ALT (SGPT) 75 (H) 1 - 41 U/L    AST (SGOT) 68 (H) 1 - 40 U/L    Alkaline Phosphatase 89 39 - 117 U/L    Total Bilirubin 0.3 0.0 - 1.2 mg/dL    Globulin 3.1 gm/dL    A/G Ratio 1.3 g/dL    BUN/Creatinine Ratio 9.2 7.0 - 25.0    Anion Gap 10.1 5.0 - 15.0 mmol/L    eGFR 92.8 >60.0 mL/min/1.73   C-reactive Protein    Collection Time: 05/07/25  9:06 PM    Specimen: Blood   Result Value Ref Range    C-Reactive Protein 0.56 (H) 0.00 - 0.50 mg/dL   CBC Auto Differential    Collection Time: 05/07/25  9:06 PM    Specimen: Blood   Result Value Ref Range    WBC 5.63 3.40 - 10.80 10*3/mm3    RBC 5.14 4.14 - 5.80 10*6/mm3    Hemoglobin 14.8 13.0 - 17.7 g/dL    Hematocrit 43.4 37.5 - 51.0 %    MCV 84.4 79.0 - 97.0 fL    MCH 28.8 26.6 - 33.0 pg    MCHC  34.1 31.5 - 35.7 g/dL    RDW 12.1 (L) 12.3 - 15.4 %    RDW-SD 36.5 (L) 37.0 - 54.0 fl    MPV 9.5 6.0 - 12.0 fL    Platelets 165 140 - 450 10*3/mm3    Neutrophil % 58.2 42.7 - 76.0 %    Lymphocyte % 32.1 19.6 - 45.3 %    Monocyte % 6.6 5.0 - 12.0 %    Eosinophil % 2.3 0.3 - 6.2 %    Basophil % 0.4 0.0 - 1.5 %    Immature Grans % 0.4 0.0 - 0.5 %    Neutrophils, Absolute 3.28 1.70 - 7.00 10*3/mm3    Lymphocytes, Absolute 1.81 0.70 - 3.10 10*3/mm3    Monocytes, Absolute 0.37 0.10 - 0.90 10*3/mm3    Eosinophils, Absolute 0.13 0.00 - 0.40 10*3/mm3    Basophils, Absolute 0.02 0.00 - 0.20 10*3/mm3    Immature Grans, Absolute 0.02 0.00 - 0.05 10*3/mm3    nRBC 0.0 0.0 - 0.2 /100 WBC   Green Top (Gel)    Collection Time: 05/07/25  9:06 PM   Result Value Ref Range    Extra Tube Hold for add-ons.    Lavender Top    Collection Time: 05/07/25  9:06 PM   Result Value Ref Range    Extra Tube hold for add-on    Gold Top - SST    Collection Time: 05/07/25  9:06 PM   Result Value Ref Range    Extra Tube Hold for add-ons.    Light Blue Top    Collection Time: 05/07/25  9:06 PM   Result Value Ref Range    Extra Tube Hold for add-ons.    Urinalysis With Culture If Indicated - Urine, Clean Catch    Collection Time: 05/07/25  9:28 PM    Specimen: Urine, Clean Catch   Result Value Ref Range    Color, UA Yellow Yellow, Straw    Appearance, UA Clear Clear    pH, UA 5.5 5.0 - 8.0    Specific Gravity, UA 1.022 1.005 - 1.030    Glucose,  mg/dL (2+) (A) Negative    Ketones, UA Trace (A) Negative    Bilirubin, UA Negative Negative    Blood, UA Small (1+) (A) Negative    Protein, UA Trace (A) Negative    Leuk Esterase, UA Negative Negative    Nitrite, UA Negative Negative    Urobilinogen, UA 1.0 E.U./dL 0.2 - 1.0 E.U./dL   Urinalysis, Microscopic Only - Urine, Clean Catch    Collection Time: 05/07/25  9:28 PM    Specimen: Urine, Clean Catch   Result Value Ref Range    RBC, UA 11-20 (A) None Seen, 0-2 /HPF    WBC, UA 0-2 None Seen, 0-2 /HPF     Bacteria, UA None Seen None Seen /HPF    Squamous Epithelial Cells, UA 0-2 None Seen, 0-2 /HPF    Hyaline Casts, UA 0-2 None Seen /LPF    Methodology Automated Microscopy              ED Course  ED Course as of 05/08/25 0112   Thu May 08, 2025   0055 CT Abdomen Pelvis With Contrast  IMPRESSION:     Normal appendix is well seen.  Fatty filtration of the liver.  No cholecystitis or pancreatitis.  Nonobstructive right nephrolithiasis with stone in the lower pole of the  right kidney.  No abdominal aortic aneurysm or retroperitoneal hemorrhage.  No cholecystitis or pancreatitis.  Mild enlarged prostate gland.        This report was finalized on 5/8/2025 12:51 AM by Arnol Tee MD   [MM]   0108 Patient diagnosed with right kidney stone and enlarge prostate. Will be d/c home with rx for toradol. Will f/u with Dr. Adkins or return to ER if symptoms worsen.  [MM]      ED Course User Index  [MM] Evy Rojas, PA                                                       Medical Decision Making    This is a 54 year old male patient who presents to the ER with chief complaint of flank pain. PMH significant for DM requiring insulin (which he hasn't had for several months due to insurance issues), HTN, HLD, GERD, kidney stones. For several months, the patient has had worsening bilateral flank pain radiating into his bilateral hips and bilateral low back. He has also had hematuria and nausea without vomiting.       Problems Addressed:  Enlarged prostate: complicated acute illness or injury  Right kidney stone: complicated acute illness or injury    Amount and/or Complexity of Data Reviewed  Labs: ordered. Decision-making details documented in ED Course.  Radiology: ordered. Decision-making details documented in ED Course.    Risk  Prescription drug management.        Final diagnoses:   Right kidney stone   Enlarged prostate       ED Disposition  ED Disposition       ED Disposition   Discharge    Condition   Stable    Comment    --               Dinh Sheikh  [address]   [address]  [facility] [address]  [phone]    In 2 days      Angel Adkins MD  [address]  [facility] [address]  [phone]    In 2 days           Medication List        New Prescriptions      ketorolac 10 MG tablet  Commonly known as: TORADOL  Take 1 tablet by mouth Every 6 (Six) Hours As Needed for Mild Pain for up to 5 days.               Where to Get Your Medications        These medications were sent to [facility], [place] - [address] - [phone]  - [phone]   [address] [address]      Phone: [phone]   ketorolac 10 MG tablet            Evy Rojas PA  05/08/25 0112

## 2025-05-24 ENCOUNTER — HOSPITAL ENCOUNTER (EMERGENCY)
Facility: HOSPITAL | Age: 55
Discharge: HOME | End: 2025-05-24
Attending: EMERGENCY MEDICINE
Payer: MEDICAID

## 2025-05-24 ENCOUNTER — APPOINTMENT (OUTPATIENT)
Dept: MRI IMAGING | Facility: HOSPITAL | Age: 55
End: 2025-05-24
Payer: MEDICAID

## 2025-05-24 VITALS
RESPIRATION RATE: 18 BRPM | SYSTOLIC BLOOD PRESSURE: 138 MMHG | HEIGHT: 69 IN | OXYGEN SATURATION: 96 % | DIASTOLIC BLOOD PRESSURE: 86 MMHG | WEIGHT: 300 LBS | BODY MASS INDEX: 44.43 KG/M2 | HEART RATE: 73 BPM | TEMPERATURE: 97.3 F

## 2025-05-24 DIAGNOSIS — M51.16 LUMBAR DISC DISEASE WITH RADICULOPATHY: Primary | ICD-10-CM

## 2025-05-24 PROCEDURE — 96375 TX/PRO/DX INJ NEW DRUG ADDON: CPT

## 2025-05-24 PROCEDURE — 72148 MRI LUMBAR SPINE W/O DYE: CPT

## 2025-05-24 PROCEDURE — 25010000002 DEXAMETHASONE PER 1 MG: Performed by: NURSE PRACTITIONER

## 2025-05-24 PROCEDURE — 96372 THER/PROPH/DIAG INJ SC/IM: CPT

## 2025-05-24 PROCEDURE — 96374 THER/PROPH/DIAG INJ IV PUSH: CPT

## 2025-05-24 PROCEDURE — 72148 MRI LUMBAR SPINE W/O DYE: CPT | Performed by: RADIOLOGY

## 2025-05-24 PROCEDURE — 25010000002 ONDANSETRON PER 1 MG

## 2025-05-24 PROCEDURE — 25010000002 HYDROMORPHONE 1 MG/ML SOLUTION: Performed by: EMERGENCY MEDICINE

## 2025-05-24 PROCEDURE — 99284 EMERGENCY DEPT VISIT MOD MDM: CPT

## 2025-05-24 PROCEDURE — 96376 TX/PRO/DX INJ SAME DRUG ADON: CPT

## 2025-05-24 PROCEDURE — 25010000002 DEXAMETHASONE PER 1 MG: Performed by: EMERGENCY MEDICINE

## 2025-05-24 PROCEDURE — 25010000002 KETOROLAC TROMETHAMINE PER 15 MG: Performed by: NURSE PRACTITIONER

## 2025-05-24 RX ORDER — DEXAMETHASONE SODIUM PHOSPHATE 4 MG/ML
8 INJECTION, SOLUTION INTRA-ARTICULAR; INTRALESIONAL; INTRAMUSCULAR; INTRAVENOUS; SOFT TISSUE ONCE
Status: DISCONTINUED | OUTPATIENT
Start: 2025-05-24 | End: 2025-05-24

## 2025-05-24 RX ORDER — DEXAMETHASONE SODIUM PHOSPHATE 10 MG/ML
10 INJECTION, SOLUTION INTRA-ARTICULAR; INTRALESIONAL; INTRAMUSCULAR; INTRAVENOUS; SOFT TISSUE ONCE
Status: COMPLETED | OUTPATIENT
Start: 2025-05-24 | End: 2025-05-24

## 2025-05-24 RX ORDER — ONDANSETRON 2 MG/ML
4 INJECTION INTRAMUSCULAR; INTRAVENOUS ONCE
Status: COMPLETED | OUTPATIENT
Start: 2025-05-24 | End: 2025-05-24

## 2025-05-24 RX ORDER — KETOROLAC TROMETHAMINE 30 MG/ML
30 INJECTION, SOLUTION INTRAMUSCULAR; INTRAVENOUS ONCE
Status: COMPLETED | OUTPATIENT
Start: 2025-05-24 | End: 2025-05-24

## 2025-05-24 RX ORDER — DEXAMETHASONE SODIUM PHOSPHATE 4 MG/ML
8 INJECTION, SOLUTION INTRA-ARTICULAR; INTRALESIONAL; INTRAMUSCULAR; INTRAVENOUS; SOFT TISSUE ONCE
Status: COMPLETED | OUTPATIENT
Start: 2025-05-24 | End: 2025-05-24

## 2025-05-24 RX ORDER — SODIUM CHLORIDE 0.9 % (FLUSH) 0.9 %
10 SYRINGE (ML) INJECTION AS NEEDED
Status: DISCONTINUED | OUTPATIENT
Start: 2025-05-24 | End: 2025-05-24 | Stop reason: HOSPADM

## 2025-05-24 RX ORDER — ONDANSETRON 2 MG/ML
INJECTION INTRAMUSCULAR; INTRAVENOUS
Status: COMPLETED
Start: 2025-05-24 | End: 2025-05-24

## 2025-05-24 RX ADMIN — KETOROLAC TROMETHAMINE 30 MG: 30 INJECTION, SOLUTION INTRAMUSCULAR; INTRAVENOUS at 14:04

## 2025-05-24 RX ADMIN — DEXAMETHASONE SODIUM PHOSPHATE 8 MG: 4 INJECTION, SOLUTION INTRA-ARTICULAR; INTRALESIONAL; INTRAMUSCULAR; INTRAVENOUS; SOFT TISSUE at 14:04

## 2025-05-24 RX ADMIN — HYDROMORPHONE HYDROCHLORIDE 1 MG: 1 INJECTION, SOLUTION INTRAMUSCULAR; INTRAVENOUS; SUBCUTANEOUS at 15:47

## 2025-05-24 RX ADMIN — DEXAMETHASONE SODIUM PHOSPHATE 10 MG: 10 INJECTION INTRAMUSCULAR; INTRAVENOUS at 17:48

## 2025-05-24 RX ADMIN — ONDANSETRON 4 MG: 2 INJECTION INTRAMUSCULAR; INTRAVENOUS at 14:10
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
## 2025-06-05 ENCOUNTER — OFFICE VISIT (OUTPATIENT)
Dept: UROLOGY | Facility: CLINIC | Age: 55
End: 2025-06-05
Payer: MEDICAID

## 2025-06-05 VITALS
HEART RATE: 77 BPM | SYSTOLIC BLOOD PRESSURE: 126 MMHG | WEIGHT: 287 LBS | DIASTOLIC BLOOD PRESSURE: 80 MMHG | HEIGHT: 69 IN | BODY MASS INDEX: 42.51 KG/M2

## 2025-06-05 DIAGNOSIS — N20.1 LEFT URETERAL STONE: Primary | ICD-10-CM

## 2025-06-05 DIAGNOSIS — R79.89 LOW TESTOSTERONE IN MALE: ICD-10-CM

## 2025-06-05 LAB
BILIRUB BLD-MCNC: ABNORMAL MG/DL
CLARITY, POC: CLEAR
COLOR UR: ABNORMAL
EXPIRATION DATE: ABNORMAL
GLUCOSE UR STRIP-MCNC: ABNORMAL MG/DL
KETONES UR QL: ABNORMAL
LEUKOCYTE EST, POC: ABNORMAL
Lab: ABNORMAL
NITRITE UR-MCNC: NEGATIVE MG/ML
PH UR: 5 [PH] (ref 5–8)
PROT UR STRIP-MCNC: ABNORMAL MG/DL
RBC # UR STRIP: ABNORMAL /UL
SP GR UR: 1.03 (ref 1–1.03)
UROBILINOGEN UR QL: NORMAL

## 2025-06-05 PROCEDURE — 87086 URINE CULTURE/COLONY COUNT: CPT | Performed by: UROLOGY

## 2025-06-05 NOTE — PROGRESS NOTES
"Chief Complaint:      Chief Complaint   Patient presents with    Left ureteral stone       HPI:   54 y.o. male turns today as a 5.1 mm right stone he has gross hematuria and he is due to have surgery with Dr. Hilton I will set him up for cystoscopy ASAP    Past Medical History:     Past Medical History:   Diagnosis Date    Back pain     Cancer     PAROTID CANCER    Degenerative joint disease     Diabetes mellitus     DVT, lower extremity     left 2013    GERD (gastroesophageal reflux disease)     Kidney stones     Neuropathy     FEET     Peroneal DVT (deep venous thrombosis)     in 1998 and dvt 2010    Pulmonary embolism     Spinal stenosis     Urolithiasis     Vertigo     OCASSIONALLY       Current Meds:     Current Outpatient Medications   Medication Sig Dispense Refill    bisoprolol-hydrochlorothiazide (ZIAC) 5-6.25 MG per tablet Take 1 tablet by mouth Daily. as directed      cyclobenzaprine (FLEXERIL) 10 MG tablet Take 1 tablet by mouth 3 (Three) Times a Day As Needed for Muscle Spasms.      meclizine (ANTIVERT) 25 MG tablet Take 1 tablet by mouth 3 (Three) Times a Day As Needed for Dizziness. 20 tablet 0    mometasone (Nasonex) 50 MCG/ACT nasal spray 2 sprays into the nostril(s) as directed by provider Daily. 17 g 0    omeprazole (PriLOSEC) 20 MG capsule       ondansetron ODT (ZOFRAN-ODT) 4 MG disintegrating tablet Place 1 tablet on the tongue Every 6 (Six) Hours As Needed for Nausea or Vomiting. 10 tablet 0    oxyCODONE-acetaminophen (PERCOCET)  MG per tablet Take 1 tablet by mouth Every 6 (Six) Hours As Needed.      OxyCONTIN 10 MG 12 hr tablet TAKE ONE TABLET BY MOUTH EVERY NIGHT AT BEDTIME AS NEEDED FOR PAIN *DNF 04/18/23*      pantoprazole (PROTONIX) 20 MG EC tablet Take 1 tablet by mouth Daily.      Syringe 25G X 5/8\" 3 ML misc Use as directed 2 x weekly 24 each 3    tamsulosin (FLOMAX) 0.4 MG capsule 24 hr capsule Take 1 capsule by mouth Daily. 14 capsule 0    Testosterone Cypionate " (Depo-Testosterone) 200 MG/ML injection Inject 1/2 cc subcutaneously every Monday and Thursday 10 mL 2    Tirzepatide (Mounjaro) 10 MG/0.5ML solution pen-injector pen Inject 0.5 mL under the skin into the appropriate area as directed 1 (One) Time Per Week.      tiZANidine (ZANAFLEX) 4 MG tablet Take 1 tablet by mouth At Night As Needed for Muscle Spasms.      Insulin Glargine (BASAGLAR KWIKPEN SC) Inject 55 Units under the skin into the appropriate area as directed Every Evening.      Ozempic, 1 MG/DOSE, 4 MG/3ML solution pen-injector INJECT 1MG UNDER THE SKIN EVERY WEEK AS DIRECTED      phentermine 37.5 MG capsule Take 1 capsule by mouth Every Morning.      tamsulosin (Flomax) 0.4 MG capsule 24 hr capsule Take 1 capsule by mouth Every Night. (Patient not taking: Reported on 2025) 30 capsule 5     No current facility-administered medications for this visit.        Allergies:      Allergies   Allergen Reactions    Morphine Seizure        Past Surgical History:     Past Surgical History:   Procedure Laterality Date    APPENDECTOMY      COLONOSCOPY      CYSTOSCOPY W/ LITHOLAPAXY / [facility]      INNER EAR SURGERY      OTHER SURGICAL HISTORY      Mass removed from left side of face    SKIN BIOPSY      URETEROSCOPY LASER LITHOTRIPSY WITH STENT INSERTION Left 3/1/2024    Procedure: URETEROSCOPY LASER LITHOTRIPSY;  Surgeon: Angel Adkins MD;  Location: [facility];  Service: Urology;  Laterality: Left;    VASECTOMY Bilateral 2020    Procedure: VASECTOMY;  Surgeon: Angel Adkins MD;  Location: [facility];  Service: Urology;  Laterality: Bilateral;       Social History:     Social History     Socioeconomic History    Marital status:    Tobacco Use    Smoking status: Former     Current packs/day: 0.00     Types: Cigarettes     Start date:      Quit date:      Years since quittin.4    Smokeless tobacco: Former     Types: Chew     Quit date:     Tobacco comments:     occasionally  uses chew once a year    Vaping Use    Vaping status: Never Used   Substance and Sexual Activity    Alcohol use: No     Comment: beer occasionally    Drug use: No    Sexual activity: Defer       Family History:     Family History   Problem Relation Age of Onset    Other Other         deveral siblings tatyana had early onset DVTs    No Known Problems Mother     No Known Problems Father        Review of Systems:     Review of Systems   Constitutional: Negative.    HENT: Negative.     Eyes: Negative.    Respiratory: Negative.     Cardiovascular: Negative.    Gastrointestinal: Negative.    Endocrine: Negative.    Musculoskeletal: Negative.    Allergic/Immunologic: Negative.    Neurological: Negative.    Hematological: Negative.    Psychiatric/Behavioral: Negative.         Physical Exam:     Physical Exam  Vitals and nursing note reviewed.   Constitutional:       Appearance: He is well-developed.   HENT:      Head: Normocephalic and atraumatic.   Eyes:      Conjunctiva/sclera: Conjunctivae normal.      Pupils: Pupils are equal, round, and reactive to light.   Cardiovascular:      Rate and Rhythm: Normal rate and regular rhythm.      Heart sounds: Normal heart sounds.   Pulmonary:      Effort: Pulmonary effort is normal.      Breath sounds: Normal breath sounds.   Abdominal:      General: Bowel sounds are normal.      Palpations: Abdomen is soft.   Musculoskeletal:         General: Normal range of motion.      Cervical back: Normal range of motion.   Skin:     General: Skin is warm and dry.   Neurological:      Mental Status: He is alert and oriented to person, place, and time.      Deep Tendon Reflexes: Reflexes are normal and symmetric.   Psychiatric:         Behavior: Behavior normal.         Thought Content: Thought content normal.         Judgment: Judgment normal.         I have reviewed the following portions of the patient's history: Allergies, current medications, past family history, past medical history, past  social history, past surgical history, problem list, and ROS and confirm it is accurate.    Recent Image (CT and/or KUB):      CT Abdomen and Pelvis: No results found for this or any previous visit.       CT Stone Protocol: Results for orders placed during the hospital encounter of 11/28/24    CT Abdomen Pelvis Stone Protocol    Narrative  Procedure: CT of the abdomen and pelvis performed without IV contrast on  November 28, 2024. The examination was performed with 4 mm axial imaging  and 4 mm sagittal and coronal reconstruction images. Total DLP 1245. The  examination was performed according to as low as reasonably achievable  dose protocol.    HISTORY: Flank pain on the right side.    COMPARISON: None.    FINDINGS:    Normal heart size. No pericardial effusion.  Old healed left posterior lateral seventh rib fracture.  Old prior left posterior lateral sixth rib fracture.  No acute process seen in the liver, gallbladder, spleen, pancreas,  adrenal glands, and abdominal aorta.  There is a 2 mm stone in the lower pole of the right kidney seen best on  image 55, series 2.  Minimal stranding around the right and left kidney.  3 mm stone at the right UVJ with associated partial right renal  obstruction.  The stone is seen best on image 108, series 2.  Slight prominence of the prostate gland size.  Normal appendix is well seen.  No abdominal aortic aneurysm or retroperitoneal hemorrhage.  No free fluid or free air. No abscess or hematoma.  Small right and left inguinal hernias contain only fat.  Mild osteoarthritis at the right and left hip joint.    Impression  1.  There is a 3 mm stone at the right UVJ with associated partial right  renal obstruction.  2.  There is a 2 mm stone at the lower pole of the right kidney.  3.  Old posterior lateral sixth and seventh rib fractures.  4.  Left lower lobe calcified granuloma.  5.  Normal appendix.  6.  Mild enlarged prostate gland.  7.  No free fluid or free air.  8.  No abscess  or hematoma.  9.  Degenerative disc disease in the lumbar spine with endplate and  facet hypertrophic changes.      This report was finalized on 11/28/2024 6:57 AM by Arnol Tee MD.       KUB: Results for orders placed during the hospital encounter of 03/28/25    XR Abdomen KUB    Narrative  EXAM:  XR Abdomen, 1 View    EXAM DATE:  3/28/2025 1:18 PM    CLINICAL HISTORY:  flank pain; N20.1-Calculus of ureter    TECHNIQUE:  Frontal supine view of the abdomen/pelvis.    COMPARISON:  No relevant prior studies available.    FINDINGS:  Gastrointestinal tract:  Unremarkable as visualized.  No dilation.  Bones/joints:  Unremarkable as visualized.  No acute fracture.    Impression  Unremarkable abdominal x-ray.    This report was finalized on 3/28/2025 1:18 PM by Dr. Cristhian Norman MD.       Labs (past 3 months):      Admission on 05/07/2025, Discharged on 05/08/2025   Component Date Value Ref Range Status    Glucose 05/07/2025 226 (H)  65 - 99 mg/dL Final    BUN 05/07/2025 9  6 - 20 mg/dL Final    Creatinine 05/07/2025 0.98  0.76 - 1.27 mg/dL Final    Sodium 05/07/2025 135 (L)  136 - 145 mmol/L Final    Potassium 05/07/2025 4.3  3.5 - 5.2 mmol/L Final    Slight hemolysis detected by analyzer. Result may be falsely elevated.    Chloride 05/07/2025 99  98 - 107 mmol/L Final    CO2 05/07/2025 25.9  22.0 - 29.0 mmol/L Final    Calcium 05/07/2025 9.3  8.6 - 10.5 mg/dL Final    Total Protein 05/07/2025 7.0  6.0 - 8.5 g/dL Final    Albumin 05/07/2025 3.9  3.5 - 5.2 g/dL Final    ALT (SGPT) 05/07/2025 75 (H)  1 - 41 U/L Final    AST (SGOT) 05/07/2025 68 (H)  1 - 40 U/L Final    Alkaline Phosphatase 05/07/2025 89  39 - 117 U/L Final    Total Bilirubin 05/07/2025 0.3  0.0 - 1.2 mg/dL Final    Globulin 05/07/2025 3.1  gm/dL Final    A/G Ratio 05/07/2025 1.3  g/dL Final    BUN/Creatinine Ratio 05/07/2025 9.2  7.0 - 25.0 Final    Anion Gap 05/07/2025 10.1  5.0 - 15.0 mmol/L Final    eGFR 05/07/2025 92.8  >60.0 mL/min/1.73 Final     C-Reactive Protein 05/07/2025 0.56 (H)  0.00 - 0.50 mg/dL Final    Color, UA 05/07/2025 Yellow  Yellow, Straw Final    Appearance, UA 05/07/2025 Clear  Clear Final    pH, UA 05/07/2025 5.5  5.0 - 8.0 Final    Specific Gravity, UA 05/07/2025 1.022  1.005 - 1.030 Final    Glucose, UA 05/07/2025 500 mg/dL (2+) (A)  Negative Final    Ketones, UA 05/07/2025 Trace (A)  Negative Final    Bilirubin, UA 05/07/2025 Negative  Negative Final    Blood, UA 05/07/2025 Small (1+) (A)  Negative Final    Protein, UA 05/07/2025 Trace (A)  Negative Final    Leuk Esterase, UA 05/07/2025 Negative  Negative Final    Nitrite, UA 05/07/2025 Negative  Negative Final    Urobilinogen, UA 05/07/2025 1.0 E.U./dL  0.2 - 1.0 E.U./dL Final    Extra Tube 05/07/2025 Hold for add-ons.   Final    Auto resulted.    Extra Tube 05/07/2025 hold for add-on   Final    Auto resulted    Extra Tube 05/07/2025 Hold for add-ons.   Final    Auto resulted.    Extra Tube 05/07/2025 Hold for add-ons.   Final    Auto resulted    WBC 05/07/2025 5.63  3.40 - 10.80 10*3/mm3 Final    RBC 05/07/2025 5.14  4.14 - 5.80 10*6/mm3 Final    Hemoglobin 05/07/2025 14.8  13.0 - 17.7 g/dL Final    Hematocrit 05/07/2025 43.4  37.5 - 51.0 % Final    MCV 05/07/2025 84.4  79.0 - 97.0 fL Final    MCH 05/07/2025 28.8  26.6 - 33.0 pg Final    MCHC 05/07/2025 34.1  31.5 - 35.7 g/dL Final    RDW 05/07/2025 12.1 (L)  12.3 - 15.4 % Final    RDW-SD 05/07/2025 36.5 (L)  37.0 - 54.0 fl Final    MPV 05/07/2025 9.5  6.0 - 12.0 fL Final    Platelets 05/07/2025 165  140 - 450 10*3/mm3 Final    Neutrophil % 05/07/2025 58.2  42.7 - 76.0 % Final    Lymphocyte % 05/07/2025 32.1  19.6 - 45.3 % Final    Monocyte % 05/07/2025 6.6  5.0 - 12.0 % Final    Eosinophil % 05/07/2025 2.3  0.3 - 6.2 % Final    Basophil % 05/07/2025 0.4  0.0 - 1.5 % Final    Immature Grans % 05/07/2025 0.4  0.0 - 0.5 % Final    Neutrophils, Absolute 05/07/2025 3.28  1.70 - 7.00 10*3/mm3 Final    Lymphocytes, Absolute 05/07/2025 1.81   0.70 - 3.10 10*3/mm3 Final    Monocytes, Absolute 05/07/2025 0.37  0.10 - 0.90 10*3/mm3 Final    Eosinophils, Absolute 05/07/2025 0.13  0.00 - 0.40 10*3/mm3 Final    Basophils, Absolute 05/07/2025 0.02  0.00 - 0.20 10*3/mm3 Final    Immature Grans, Absolute 05/07/2025 0.02  0.00 - 0.05 10*3/mm3 Final    nRBC 05/07/2025 0.0  0.0 - 0.2 /100 WBC Final    RBC, UA 05/07/2025 11-20 (A)  None Seen, 0-2 /HPF Final    WBC, UA 05/07/2025 0-2  None Seen, 0-2 /HPF Final    Urine culture not indicated.    Bacteria, UA 05/07/2025 None Seen  None Seen /HPF Final    Squamous Epithelial Cells, UA 05/07/2025 0-2  None Seen, 0-2 /HPF Final    Hyaline Casts, UA 05/07/2025 0-2  None Seen /LPF Final    Methodology 05/07/2025 Automated Microscopy   Final   Office Visit on 03/28/2025   Component Date Value Ref Range Status    Color 03/28/2025 Dark Yellow  Yellow, Straw, Dark Yellow, Huma Final    Clarity, UA 03/28/2025 Clear  Clear Final    Specific Gravity  03/28/2025 1.030  1.005 - 1.030 Final    pH, Urine 03/28/2025 6.0  5.0 - 8.0 Final    Leukocytes 03/28/2025 Negative  Negative Final    Nitrite, UA 03/28/2025 Negative  Negative Final    Protein, POC 03/28/2025 Negative  Negative mg/dL Final    Glucose, UA 03/28/2025 500 mg/dL (A)  Negative mg/dL Final    Ketones, UA 03/28/2025 Negative  Negative Final    Urobilinogen, UA 03/28/2025 Normal  Normal, 0.2 E.U./dL Final    Bilirubin 03/28/2025 Negative  Negative Final    Blood, UA 03/28/2025 3+ (A)  Negative Final    Lot Number 03/28/2025 [id number]   Final    Expiration Date 03/28/2025 52,026   Final   Admission on 03/10/2025, Discharged on 03/11/2025   Component Date Value Ref Range Status    Glucose 03/10/2025 232 (H)  65 - 99 mg/dL Final    BUN 03/10/2025 15  6 - 20 mg/dL Final    Creatinine 03/10/2025 1.18  0.76 - 1.27 mg/dL Final    Sodium 03/10/2025 137  136 - 145 mmol/L Final    Potassium 03/10/2025 4.3  3.5 - 5.2 mmol/L Final    Specimen hemolyzed.  Result may be falsely  elevated.    Chloride 03/10/2025 101  98 - 107 mmol/L Final    CO2 03/10/2025 25.5  22.0 - 29.0 mmol/L Final    Calcium 03/10/2025 9.4  8.6 - 10.5 mg/dL Final    Total Protein 03/10/2025 7.1  6.0 - 8.5 g/dL Final    Albumin 03/10/2025 3.8  3.5 - 5.2 g/dL Final    ALT (SGPT) 03/10/2025 52 (H)  1 - 41 U/L Final    Specimen hemolyzed.  Result may  be falsely elevated.    AST (SGOT) 03/10/2025 38  1 - 40 U/L Final    Alkaline Phosphatase 03/10/2025 99  39 - 117 U/L Final    Total Bilirubin 03/10/2025 0.2  0.0 - 1.2 mg/dL Final    Globulin 03/10/2025 3.3  gm/dL Final    A/G Ratio 03/10/2025 1.2  g/dL Final    BUN/Creatinine Ratio 03/10/2025 12.7  7.0 - 25.0 Final    Anion Gap 03/10/2025 10.5  5.0 - 15.0 mmol/L Final    eGFR 03/10/2025 74.2  >60.0 mL/min/1.73 Final    ABO Type 03/10/2025 O   Final    RH type 03/10/2025 Positive   Final    Antibody Screen 03/10/2025 Negative   Final    T&S Expiration Date 03/10/2025 3/13/2025 11:59:59 PM   Final    Lactate 03/10/2025 2.6 (C)  0.5 - 2.0 mmol/L Final    Protime 03/10/2025 14.2  11.6 - 15.1 Seconds Final    INR 03/10/2025 1.09  0.90 - 1.10 Final    Extra Tube 03/10/2025 Hold for add-ons.   Final    Auto resulted.    Extra Tube 03/10/2025 hold for add-on   Final    Auto resulted    Extra Tube 03/10/2025 Hold for add-ons.   Final    Auto resulted.    Extra Tube 03/10/2025 Hold for add-ons.   Final    Auto resulted    WBC 03/10/2025 7.45  3.40 - 10.80 10*3/mm3 Final    RBC 03/10/2025 5.62  4.14 - 5.80 10*6/mm3 Final    Hemoglobin 03/10/2025 16.8  13.0 - 17.7 g/dL Final    Hematocrit 03/10/2025 48.6  37.5 - 51.0 % Final    MCV 03/10/2025 86.5  79.0 - 97.0 fL Final    MCH 03/10/2025 29.9  26.6 - 33.0 pg Final    MCHC 03/10/2025 34.6  31.5 - 35.7 g/dL Final    RDW 03/10/2025 12.3  12.3 - 15.4 % Final    RDW-SD 03/10/2025 38.9  37.0 - 54.0 fl Final    MPV 03/10/2025 9.4  6.0 - 12.0 fL Final    Platelets 03/10/2025 200  140 - 450 10*3/mm3 Final    Neutrophil % 03/10/2025 64.4  42.7 -  76.0 % Final    Lymphocyte % 03/10/2025 26.8  19.6 - 45.3 % Final    Monocyte % 03/10/2025 6.7  5.0 - 12.0 % Final    Eosinophil % 03/10/2025 1.5  0.3 - 6.2 % Final    Basophil % 03/10/2025 0.5  0.0 - 1.5 % Final    Immature Grans % 03/10/2025 0.1  0.0 - 0.5 % Final    Neutrophils, Absolute 03/10/2025 4.79  1.70 - 7.00 10*3/mm3 Final    Lymphocytes, Absolute 03/10/2025 2.00  0.70 - 3.10 10*3/mm3 Final    Monocytes, Absolute 03/10/2025 0.50  0.10 - 0.90 10*3/mm3 Final    Eosinophils, Absolute 03/10/2025 0.11  0.00 - 0.40 10*3/mm3 Final    Basophils, Absolute 03/10/2025 0.04  0.00 - 0.20 10*3/mm3 Final    Immature Grans, Absolute 03/10/2025 0.01  0.00 - 0.05 10*3/mm3 Final    nRBC 03/10/2025 0.0  0.0 - 0.2 /100 WBC Final    ABO Type 03/10/2025 O   Final    RH type 03/10/2025 Positive   Final        Procedure:       Assessment/Plan:   Hematuria-patient was diagnosed with hematuria.  We discussed the significance of microscopic hematuria versus gross hematuria.  We discussed the presence or absence of the type of clotting identified including vermiform clots consistent with ureteral bleeding versus just pink-tinged urine versus rey clots.  We discussed the presence of urokinase in the urine which causes the clots to dissolve with time.  We discussed the fact that it takes only a very small amount of blood in the urine to make the urine very red appearing and therefore give one the impression that there is much more blood loss that is really present.  I discussed the use of both an upper and lower tract investigation.  I discussed the fact that an upper tract investigation includes a normal renal ultrasound with a significant risk of missing more subtle lesions.  Progressing to a CT scan without contrast and finally the CT scan with contrast being the gold standard to diagnose the small neoplasms.  We discussed the lower tract investigation consisting of a cystoscopy in many of the cases where the upper tract study is  negative.  Also discussed the fact that if there is a contraindication to the use of contrast we would do a noncontrasted study and this also has a chance of missing small lesions.  The specific instance would be cases of diabetes and chronic renal insufficiency.  Discussed the fact that there is about a 96% chance of a negative workup with episodes of microscopic hematuria and with much greater in the face of gross hematuria.  We discussed the fact that this is a non-cumulative test.  In other words, if there is hematuria next year I would recommend continuing to work up the condition because of the fact that neoplasms may be small at the first workup and easily are missed.  I discussed the differential diagnosis of hematuria including trauma, neoplasia, infection, etc.  We discussed the fact that if there is any history of chronic kidney disease or risk factors such as diabetes for contrast a noncontrasted study will be utilized.  We will initiate an investigation.  For cystoscopy ASAP  Renal calculus-we discussed the presence of the stone. We discussed the various therapeutic options available including percutaneous nephrostolithotomy, ureteroscopy and extracorporeal shockwave  lithotripsy.  We discussed the risks of lithotripsy including the passage of stones, the development of a large string of stones in the distal ureter known as Steinstrasse.  In the 3% incidence of that we will need to proceed with a ureteroscopy for obstructing fragments.  Extremely rare incidence of renal hematoma and the significance of this.  We discussed percutaneous nephrostolithotomy and its use as well as the risks and benefits such as the need for postoperative hospitalization, and the risk of damage to the kidney and the remote risk of a nephrectomy.  We also discussed the use of ureteroscopy in the upper tracts.  That this is as a decreased success rate to completely remove the stones on the first option and that very likely a  stent will be required requiring an additional procedure for removal.  We discussed the absolute relative indicators for intervention including the presence of sepsis and pain we cannot control ais the primary need for urgent intervention.  We discussed placement of a stent if indicated and the management of the stent as well.  Has a 5.2 mm right renal stone              This document has been electronically signed by JOSE A SILVEIRA MD June 5, 2025 13:31 EDT    Dictated Utilizing Dragon Dictation: Part of this note may be an electronic transcription/translation of spoken language to printed text using the Dragon Dictation System.

## 2025-06-07 PROBLEM — R31.0 GROSS HEMATURIA: Status: ACTIVE | Noted: 2025-06-07

## 2025-06-07 LAB — BACTERIA SPEC AEROBE CULT: NO GROWTH

## 2025-06-10 ENCOUNTER — HOSPITAL ENCOUNTER (EMERGENCY)
Facility: HOSPITAL | Age: 55
Discharge: HOME | End: 2025-06-10
Payer: MEDICAID

## 2025-06-10 ENCOUNTER — APPOINTMENT (OUTPATIENT)
Dept: CT IMAGING | Facility: HOSPITAL | Age: 55
End: 2025-06-10
Payer: MEDICAID

## 2025-06-10 VITALS
HEIGHT: 70 IN | TEMPERATURE: 97.9 F | WEIGHT: 295 LBS | OXYGEN SATURATION: 96 % | SYSTOLIC BLOOD PRESSURE: 135 MMHG | HEART RATE: 69 BPM | DIASTOLIC BLOOD PRESSURE: 59 MMHG | BODY MASS INDEX: 42.23 KG/M2 | RESPIRATION RATE: 16 BRPM

## 2025-06-10 DIAGNOSIS — R10.9 RIGHT FLANK PAIN: Primary | ICD-10-CM

## 2025-06-10 LAB
ALBUMIN SERPL-MCNC: 3.9 G/DL (ref 3.5–5.2)
ALBUMIN/GLOB SERPL: 1.4 G/DL
ALP SERPL-CCNC: 103 U/L (ref 39–117)
ALT SERPL W P-5'-P-CCNC: 48 U/L (ref 1–41)
ANION GAP SERPL CALCULATED.3IONS-SCNC: 10.4 MMOL/L (ref 5–15)
AST SERPL-CCNC: 31 U/L (ref 1–40)
BASOPHILS # BLD AUTO: 0.03 10*3/MM3 (ref 0–0.2)
BASOPHILS NFR BLD AUTO: 0.4 % (ref 0–1.5)
BILIRUB SERPL-MCNC: 0.3 MG/DL (ref 0–1.2)
BUN SERPL-MCNC: 15.3 MG/DL (ref 6–20)
BUN/CREAT SERPL: 15.9 (ref 7–25)
CALCIUM SPEC-SCNC: 9.1 MG/DL (ref 8.6–10.5)
CHLORIDE SERPL-SCNC: 101 MMOL/L (ref 98–107)
CO2 SERPL-SCNC: 23.6 MMOL/L (ref 22–29)
CREAT SERPL-MCNC: 0.96 MG/DL (ref 0.76–1.27)
CRP SERPL-MCNC: 3.05 MG/DL (ref 0–0.5)
DEPRECATED RDW RBC AUTO: 37.5 FL (ref 37–54)
EGFRCR SERPLBLD CKD-EPI 2021: 95.1 ML/MIN/1.73
EOSINOPHIL # BLD AUTO: 0.14 10*3/MM3 (ref 0–0.4)
EOSINOPHIL NFR BLD AUTO: 1.9 % (ref 0.3–6.2)
ERYTHROCYTE [DISTWIDTH] IN BLOOD BY AUTOMATED COUNT: 12.5 % (ref 12.3–15.4)
GLOBULIN UR ELPH-MCNC: 2.7 GM/DL
GLUCOSE SERPL-MCNC: 228 MG/DL (ref 65–99)
HCT VFR BLD AUTO: 41.6 % (ref 37.5–51)
HGB BLD-MCNC: 14.2 G/DL (ref 13–17.7)
HOLD SPECIMEN: NORMAL
HOLD SPECIMEN: NORMAL
IMM GRANULOCYTES # BLD AUTO: 0.03 10*3/MM3 (ref 0–0.05)
IMM GRANULOCYTES NFR BLD AUTO: 0.4 % (ref 0–0.5)
LIPASE SERPL-CCNC: 43 U/L (ref 13–60)
LYMPHOCYTES # BLD AUTO: 1.69 10*3/MM3 (ref 0.7–3.1)
LYMPHOCYTES NFR BLD AUTO: 22.9 % (ref 19.6–45.3)
MCH RBC QN AUTO: 28.6 PG (ref 26.6–33)
MCHC RBC AUTO-ENTMCNC: 34.1 G/DL (ref 31.5–35.7)
MCV RBC AUTO: 83.9 FL (ref 79–97)
MONOCYTES # BLD AUTO: 0.42 10*3/MM3 (ref 0.1–0.9)
MONOCYTES NFR BLD AUTO: 5.7 % (ref 5–12)
NEUTROPHILS NFR BLD AUTO: 5.06 10*3/MM3 (ref 1.7–7)
NEUTROPHILS NFR BLD AUTO: 68.7 % (ref 42.7–76)
NRBC BLD AUTO-RTO: 0 /100 WBC (ref 0–0.2)
PLATELET # BLD AUTO: 165 10*3/MM3 (ref 140–450)
PMV BLD AUTO: 9.3 FL (ref 6–12)
POTASSIUM SERPL-SCNC: 3.9 MMOL/L (ref 3.5–5.2)
PROT SERPL-MCNC: 6.6 G/DL (ref 6–8.5)
RBC # BLD AUTO: 4.96 10*6/MM3 (ref 4.14–5.8)
SODIUM SERPL-SCNC: 135 MMOL/L (ref 136–145)
WBC NRBC COR # BLD AUTO: 7.37 10*3/MM3 (ref 3.4–10.8)
WHOLE BLOOD HOLD COAG: NORMAL
WHOLE BLOOD HOLD SPECIMEN: NORMAL

## 2025-06-10 PROCEDURE — 25010000002 KETOROLAC TROMETHAMINE PER 15 MG

## 2025-06-10 PROCEDURE — 86140 C-REACTIVE PROTEIN: CPT | Performed by: PHYSICIAN ASSISTANT

## 2025-06-10 PROCEDURE — 25010000002 ONDANSETRON PER 1 MG

## 2025-06-10 PROCEDURE — 99284 EMERGENCY DEPT VISIT MOD MDM: CPT

## 2025-06-10 PROCEDURE — 25010000002 HYDROMORPHONE PER 4 MG

## 2025-06-10 PROCEDURE — 25010000002 PROCHLORPERAZINE 10 MG/2ML SOLUTION: Performed by: PHYSICIAN ASSISTANT

## 2025-06-10 PROCEDURE — 25010000002 MORPHINE PER 10 MG

## 2025-06-10 PROCEDURE — 96375 TX/PRO/DX INJ NEW DRUG ADDON: CPT

## 2025-06-10 PROCEDURE — 85025 COMPLETE CBC W/AUTO DIFF WBC: CPT | Performed by: PHYSICIAN ASSISTANT

## 2025-06-10 PROCEDURE — 83690 ASSAY OF LIPASE: CPT | Performed by: PHYSICIAN ASSISTANT

## 2025-06-10 PROCEDURE — 96374 THER/PROPH/DIAG INJ IV PUSH: CPT

## 2025-06-10 PROCEDURE — 80053 COMPREHEN METABOLIC PANEL: CPT | Performed by: PHYSICIAN ASSISTANT

## 2025-06-10 PROCEDURE — 25810000003 SODIUM CHLORIDE 0.9 % SOLUTION: Performed by: PHYSICIAN ASSISTANT

## 2025-06-10 PROCEDURE — 74176 CT ABD & PELVIS W/O CONTRAST: CPT

## 2025-06-10 RX ORDER — ONDANSETRON 2 MG/ML
4 INJECTION INTRAMUSCULAR; INTRAVENOUS ONCE
Status: COMPLETED | OUTPATIENT
Start: 2025-06-10 | End: 2025-06-10

## 2025-06-10 RX ORDER — KETOROLAC TROMETHAMINE 30 MG/ML
30 INJECTION, SOLUTION INTRAMUSCULAR; INTRAVENOUS ONCE
Status: COMPLETED | OUTPATIENT
Start: 2025-06-10 | End: 2025-06-10

## 2025-06-10 RX ORDER — SODIUM CHLORIDE 0.9 % (FLUSH) 0.9 %
10 SYRINGE (ML) INJECTION AS NEEDED
Status: DISCONTINUED | OUTPATIENT
Start: 2025-06-10 | End: 2025-06-10 | Stop reason: HOSPADM

## 2025-06-10 RX ORDER — HYDROMORPHONE HYDROCHLORIDE 1 MG/ML
0.5 INJECTION, SOLUTION INTRAMUSCULAR; INTRAVENOUS; SUBCUTANEOUS ONCE
Refills: 0 | Status: COMPLETED | OUTPATIENT
Start: 2025-06-10 | End: 2025-06-10

## 2025-06-10 RX ORDER — PROCHLORPERAZINE EDISYLATE 5 MG/ML
10 INJECTION INTRAMUSCULAR; INTRAVENOUS ONCE
Status: COMPLETED | OUTPATIENT
Start: 2025-06-10 | End: 2025-06-10

## 2025-06-10 RX ORDER — PROCHLORPERAZINE MALEATE 10 MG
10 TABLET ORAL EVERY 6 HOURS PRN
Qty: 20 TABLET | Refills: 0 | Status: SHIPPED | OUTPATIENT
Start: 2025-06-10

## 2025-06-10 RX ADMIN — SODIUM CHLORIDE 500 ML: 9 INJECTION, SOLUTION INTRAVENOUS at 03:41

## 2025-06-10 RX ADMIN — PROCHLORPERAZINE EDISYLATE 10 MG: 5 INJECTION INTRAMUSCULAR; INTRAVENOUS at 03:41

## 2025-06-10 RX ADMIN — ONDANSETRON 4 MG: 2 INJECTION INTRAMUSCULAR; INTRAVENOUS at 02:02

## 2025-06-10 RX ADMIN — KETOROLAC TROMETHAMINE 30 MG: 30 INJECTION, SOLUTION INTRAMUSCULAR; INTRAVENOUS at 02:03

## 2025-06-10 RX ADMIN — MORPHINE SULFATE 4 MG: 4 INJECTION, SOLUTION INTRAMUSCULAR; INTRAVENOUS at 02:02

## 2025-06-10 RX ADMIN — HYDROMORPHONE HYDROCHLORIDE 0.5 MG: 1 INJECTION, SOLUTION INTRAMUSCULAR; INTRAVENOUS; SUBCUTANEOUS at 03:10

## 2025-06-10 NOTE — ED PROVIDER NOTES
Subjective     History provided by:  Patient  Flank Pain  Pain location:  R flank  Pain quality: heavy, sharp and stabbing    Pain radiates to:  Does not radiate  Pain severity:  Moderate  Onset quality:  Sudden  Duration:  1 day  Timing:  Constant  Progression:  Worsening  Chronicity:  Recurrent  Relieved by:  Nothing  Associated symptoms: nausea and vomiting    Associated symptoms: no chest pain, no dysuria and no fever    Risk factors: obesity        Review of Systems   Constitutional: Negative.  Negative for fever.   HENT: Negative.     Respiratory: Negative.     Cardiovascular: Negative.  Negative for chest pain.   Gastrointestinal:  Positive for nausea and vomiting. Negative for abdominal pain.   Endocrine: Negative.    Genitourinary:  Positive for flank pain. Negative for dysuria.   Skin: Negative.    Neurological: Negative.    Psychiatric/Behavioral: Negative.     All other systems reviewed and are negative.      Past Medical History:   Diagnosis Date    Back pain     Cancer     PAROTID CANCER    Degenerative joint disease     Diabetes mellitus     DVT, lower extremity     left 2013    GERD (gastroesophageal reflux disease)     Kidney stones     Neuropathy     FEET     Peroneal DVT (deep venous thrombosis)     in 1998 and dvt 2010    Pulmonary embolism     Spinal stenosis     Urolithiasis     Vertigo     OCASSIONALLY       No Known Allergies    Past Surgical History:   Procedure Laterality Date    APPENDECTOMY      COLONOSCOPY      CYSTOSCOPY W/ LITHOLAPAXY / EHL      INNER EAR SURGERY      OTHER SURGICAL HISTORY      Mass removed from left side of face    SKIN BIOPSY      URETEROSCOPY LASER LITHOTRIPSY WITH STENT INSERTION Left 3/1/2024    Procedure: URETEROSCOPY LASER LITHOTRIPSY;  Surgeon: Angel Adkins MD;  Location: [facility];  Service: Urology;  Laterality: Left;    VASECTOMY Bilateral 03/04/2020    Procedure: VASECTOMY;  Surgeon: Angel Adkins MD;  Location: [facility];  Service:  Urology;  Laterality: Bilateral;       Family History   Problem Relation Age of Onset    Other Other         deveral siblings tatyana had early onset DVTs    No Known Problems Mother     No Known Problems Father        Social History     Socioeconomic History    Marital status:    Tobacco Use    Smoking status: Former     Current packs/day: 0.00     Types: Cigarettes     Start date:      Quit date:      Years since quittin.4    Smokeless tobacco: Former     Types: Chew     Quit date:     Tobacco comments:     occasionally uses chew once a year    Vaping Use    Vaping status: Never Used   Substance and Sexual Activity    Alcohol use: No     Comment: beer occasionally    Drug use: No    Sexual activity: Defer           Objective   Physical Exam  Vitals and nursing note reviewed.   Constitutional:       General: He is not in acute distress.     Appearance: He is well-developed. He is not diaphoretic.   HENT:      Head: Normocephalic and atraumatic.      Right Ear: External ear normal.      Left Ear: External ear normal.      Nose: Nose normal.   Eyes:      Conjunctiva/sclera: Conjunctivae normal.   Neck:      Vascular: No JVD.      Trachea: No tracheal deviation.   Cardiovascular:      Rate and Rhythm: Normal rate and regular rhythm.      Heart sounds: Normal heart sounds. No murmur heard.  Pulmonary:      Effort: Pulmonary effort is normal. No respiratory distress.      Breath sounds: Normal breath sounds. No wheezing.   Abdominal:      Palpations: Abdomen is soft.      Tenderness: There is no abdominal tenderness. There is right CVA tenderness.   Musculoskeletal:         General: Tenderness (lower back) present. No deformity.      Cervical back: Normal range of motion and neck supple.   Skin:     General: Skin is warm and dry.      Coloration: Skin is not pale.      Findings: No erythema or rash.   Neurological:      Mental Status: He is alert and oriented to person, place, and time.      Cranial  Nerves: No cranial nerve deficit.   Psychiatric:         Behavior: Behavior normal.         Thought Content: Thought content normal.         Procedures           ED Course  ED Course as of 06/10/25 0542   Tue Vinicio 10, 2025   0508 CT abd pelvis rad interpreted:  Nonobstructive right nephrolithiasis.  No ureter or bladder stone.  No hydronephrosis or perinephric stranding.  Mild left colon and sigmoid colon diverticulosis.  Very small umbilical hernia contains only fat.  Normal appendix.  Small bands of scar versus atelectasis at the left lung base      [RB]      ED Course User Index  [RB] Alxeys Madrid II, PA                                                       Medical Decision Making  Amount and/or Complexity of Data Reviewed  Labs: ordered.  Radiology: ordered.    Risk  Prescription drug management.        Final diagnoses:   Right flank pain       ED Disposition  ED Disposition       ED Disposition   Discharge    Condition   Stable    Comment   --               Dinh Sheikh  [address] [place] [address]  [address]  [place] [id number] [phone]    Schedule an appointment as soon as possible for a visit            Medication List        New Prescriptions      prochlorperazine 10 MG tablet  Commonly known as: COMPAZINE  Take 1 tablet by mouth Every 6 (Six) Hours As Needed for Nausea or Vomiting.               Where to Get Your Medications        These medications were sent to [place], [place] - [address] - [phone]  - [phone]   [address] [place] [id number]      Phone: [phone]   prochlorperazine 10 MG tablet            Alexys Madrid II, PA  06/10/25 0542

## 2025-06-12 ENCOUNTER — PROCEDURE VISIT (OUTPATIENT)
Dept: UROLOGY | Facility: CLINIC | Age: 55
End: 2025-06-12
Payer: MEDICAID

## 2025-06-12 DIAGNOSIS — R31.0 GROSS HEMATURIA: Primary | ICD-10-CM

## 2025-06-12 NOTE — PROGRESS NOTES
"Chief Complaint:      Chief Complaint   Patient presents with    Cystoscopy    Gross Hematuria       HPI:   54 y.o. male.    Past Medical History:     Past Medical History:   Diagnosis Date    Back pain     Cancer     PAROTID CANCER    Degenerative joint disease     Diabetes mellitus     DVT, lower extremity     left 2013    GERD (gastroesophageal reflux disease)     Kidney stones     Neuropathy     FEET     Peroneal DVT (deep venous thrombosis)     in 1998 and dvt 2010    Pulmonary embolism     Spinal stenosis     Urolithiasis     Vertigo     OCASSIONALLY       Current Meds:     Current Outpatient Medications   Medication Sig Dispense Refill    bisoprolol-hydrochlorothiazide (ZIAC) 5-6.25 MG per tablet Take 1 tablet by mouth Daily. as directed      cyclobenzaprine (FLEXERIL) 10 MG tablet Take 1 tablet by mouth 3 (Three) Times a Day As Needed for Muscle Spasms.      meclizine (ANTIVERT) 25 MG tablet Take 1 tablet by mouth 3 (Three) Times a Day As Needed for Dizziness. 20 tablet 0    mometasone (Nasonex) 50 MCG/ACT nasal spray 2 sprays into the nostril(s) as directed by provider Daily. 17 g 0    omeprazole (PriLOSEC) 20 MG capsule       ondansetron ODT (ZOFRAN-ODT) 4 MG disintegrating tablet Place 1 tablet on the tongue Every 6 (Six) Hours As Needed for Nausea or Vomiting. 10 tablet 0    oxyCODONE-acetaminophen (PERCOCET)  MG per tablet Take 1 tablet by mouth Every 6 (Six) Hours As Needed.      OxyCONTIN 10 MG 12 hr tablet TAKE ONE TABLET BY MOUTH EVERY NIGHT AT BEDTIME AS NEEDED FOR PAIN *DNF 04/18/23*      pantoprazole (PROTONIX) 20 MG EC tablet Take 1 tablet by mouth Daily.      prochlorperazine (COMPAZINE) 10 MG tablet Take 1 tablet by mouth Every 6 (Six) Hours As Needed for Nausea or Vomiting. 20 tablet 0    Syringe 25G X 5/8\" 3 ML misc Use as directed 2 x weekly 24 each 3    tamsulosin (FLOMAX) 0.4 MG capsule 24 hr capsule Take 1 capsule by mouth Daily. 14 capsule 0    Testosterone Cypionate " (Depo-Testosterone) 200 MG/ML injection Inject 1/2 cc subcutaneously every Monday and Thursday 10 mL 2    Tirzepatide (Mounjaro) 10 MG/0.5ML solution pen-injector pen Inject 0.5 mL under the skin into the appropriate area as directed 1 (One) Time Per Week.      tiZANidine (ZANAFLEX) 4 MG tablet Take 1 tablet by mouth At Night As Needed for Muscle Spasms.      Insulin Glargine (BASAGLAR KWIKPEN SC) Inject 55 Units under the skin into the appropriate area as directed Every Evening.      Ozempic, 1 MG/DOSE, 4 MG/3ML solution pen-injector INJECT 1MG UNDER THE SKIN EVERY WEEK AS DIRECTED      phentermine 37.5 MG capsule Take 1 capsule by mouth Every Morning.      tamsulosin (Flomax) 0.4 MG capsule 24 hr capsule Take 1 capsule by mouth Every Night. (Patient not taking: Reported on 3/28/2025) 30 capsule 5     No current facility-administered medications for this visit.        Allergies:      No Known Allergies     Past Surgical History:     Past Surgical History:   Procedure Laterality Date    APPENDECTOMY      COLONOSCOPY      CYSTOSCOPY W/ LITHOLAPAXY / EHL      INNER EAR SURGERY      OTHER SURGICAL HISTORY      Mass removed from left side of face    SKIN BIOPSY      URETEROSCOPY LASER LITHOTRIPSY WITH STENT INSERTION Left 3/1/2024    Procedure: URETEROSCOPY LASER LITHOTRIPSY;  Surgeon: Angel Adkins MD;  Location: [facility];  Service: Urology;  Laterality: Left;    VASECTOMY Bilateral 2020    Procedure: VASECTOMY;  Surgeon: Angel Adkins MD;  Location: [facility];  Service: Urology;  Laterality: Bilateral;       Social History:     Social History     Socioeconomic History    Marital status:    Tobacco Use    Smoking status: Former     Current packs/day: 0.00     Types: Cigarettes     Start date:      Quit date:      Years since quittin.4    Smokeless tobacco: Former     Types: Chew     Quit date:     Tobacco comments:     occasionally uses chew once a year    Vaping Use     Vaping status: Never Used   Substance and Sexual Activity    Alcohol use: No     Comment: beer occasionally    Drug use: No    Sexual activity: Defer       Family History:     Family History   Problem Relation Age of Onset    Other Other         deveral siblings havd had early onset DVTs    No Known Problems Mother     No Known Problems Father        Review of Systems:     Review of Systems    Physical Exam:     Physical Exam    ***  Recent Image (CT and/or KUB):      CT Abdomen and Pelvis: No results found for this or any previous visit.       CT Stone Protocol: Results for orders placed during the hospital encounter of 06/10/25    CT Abdomen Pelvis Stone Protocol    Narrative  PROCEDURE: CT of the abdomen and pelvis performed without IV contrast on  Shama 10, 2025. Examination was performed with 4 mm axial imaging and  sagittal and coronal reconstruction images. Examination was performed  according to as low as reasonably achievable dose protocol.    HISTORY: Nausea. Right flank pain.    COMPARISON: None.    FINDINGS:    Small bands of scar versus atelectasis at the left lung base.  Normal heart size.  No acute process seen in the liver, spleen, gallbladder, pancreas, and  adrenal glands.  Normal appendix is well seen.  Mild left colon and sigmoid colon diverticulosis.  Very small umbilical hernia contains only fat.  Mild prominence of the size of the prostate gland  The bladder appears unremarkable.  No free fluid or free air. No abscess or hematoma.    Right nephrolithiasis. There is a 6.4 mm stone in the lower pole of the  right kidney seen best on image 56, series 2. This appears  nonobstructive.  No ureter stone identified.  No hydronephrosis or perinephric stranding.  No bladder stone identified to suggest recently passed stone.    Impression  Nonobstructive right nephrolithiasis.  No ureter or bladder stone.  No hydronephrosis or perinephric stranding.  Mild left colon and sigmoid colon  diverticulosis.  Very small umbilical hernia contains only fat.  Normal appendix.  Small bands of scar versus atelectasis at the left lung base        This report was finalized on 6/10/2025 4:56 AM by Arnol Tee MD.       KUB: Results for orders placed during the hospital encounter of 03/28/25    XR Abdomen KUB    Narrative  EXAM:  XR Abdomen, 1 View    EXAM DATE:  3/28/2025 1:18 PM    CLINICAL HISTORY:  flank pain; N20.1-Calculus of ureter    TECHNIQUE:  Frontal supine view of the abdomen/pelvis.    COMPARISON:  No relevant prior studies available.    FINDINGS:  Gastrointestinal tract:  Unremarkable as visualized.  No dilation.  Bones/joints:  Unremarkable as visualized.  No acute fracture.    Impression  Unremarkable abdominal x-ray.    This report was finalized on 3/28/2025 1:18 PM by Dr. Cristhian Norman MD.       Labs (past 3 months):      Admission on 06/10/2025, Discharged on 06/10/2025   Component Date Value Ref Range Status    Glucose 06/10/2025 228 (H)  65 - 99 mg/dL Final    BUN 06/10/2025 15.3  6.0 - 20.0 mg/dL Final    Creatinine 06/10/2025 0.96  0.76 - 1.27 mg/dL Final    Sodium 06/10/2025 135 (L)  136 - 145 mmol/L Final    Potassium 06/10/2025 3.9  3.5 - 5.2 mmol/L Final    Chloride 06/10/2025 101  98 - 107 mmol/L Final    CO2 06/10/2025 23.6  22.0 - 29.0 mmol/L Final    Calcium 06/10/2025 9.1  8.6 - 10.5 mg/dL Final    Total Protein 06/10/2025 6.6  6.0 - 8.5 g/dL Final    Albumin 06/10/2025 3.9  3.5 - 5.2 g/dL Final    ALT (SGPT) 06/10/2025 48 (H)  1 - 41 U/L Final    AST (SGOT) 06/10/2025 31  1 - 40 U/L Final    Alkaline Phosphatase 06/10/2025 103  39 - 117 U/L Final    Total Bilirubin 06/10/2025 0.3  0.0 - 1.2 mg/dL Final    Globulin 06/10/2025 2.7  gm/dL Final    A/G Ratio 06/10/2025 1.4  g/dL Final    BUN/Creatinine Ratio 06/10/2025 15.9  7.0 - 25.0 Final    Anion Gap 06/10/2025 10.4  5.0 - 15.0 mmol/L Final    eGFR 06/10/2025 95.1  >60.0 mL/min/1.73 Final    Lipase 06/10/2025 43  13 - 60 U/L  Final    C-Reactive Protein 06/10/2025 3.05 (H)  0.00 - 0.50 mg/dL Final    WBC 06/10/2025 7.37  3.40 - 10.80 10*3/mm3 Final    RBC 06/10/2025 4.96  4.14 - 5.80 10*6/mm3 Final    Hemoglobin 06/10/2025 14.2  13.0 - 17.7 g/dL Final    Hematocrit 06/10/2025 41.6  37.5 - 51.0 % Final    MCV 06/10/2025 83.9  79.0 - 97.0 fL Final    MCH 06/10/2025 28.6  26.6 - 33.0 pg Final    MCHC 06/10/2025 34.1  31.5 - 35.7 g/dL Final    RDW 06/10/2025 12.5  12.3 - 15.4 % Final    RDW-SD 06/10/2025 37.5  37.0 - 54.0 fl Final    MPV 06/10/2025 9.3  6.0 - 12.0 fL Final    Platelets 06/10/2025 165  140 - 450 10*3/mm3 Final    Neutrophil % 06/10/2025 68.7  42.7 - 76.0 % Final    Lymphocyte % 06/10/2025 22.9  19.6 - 45.3 % Final    Monocyte % 06/10/2025 5.7  5.0 - 12.0 % Final    Eosinophil % 06/10/2025 1.9  0.3 - 6.2 % Final    Basophil % 06/10/2025 0.4  0.0 - 1.5 % Final    Immature Grans % 06/10/2025 0.4  0.0 - 0.5 % Final    Neutrophils, Absolute 06/10/2025 5.06  1.70 - 7.00 10*3/mm3 Final    Lymphocytes, Absolute 06/10/2025 1.69  0.70 - 3.10 10*3/mm3 Final    Monocytes, Absolute 06/10/2025 0.42  0.10 - 0.90 10*3/mm3 Final    Eosinophils, Absolute 06/10/2025 0.14  0.00 - 0.40 10*3/mm3 Final    Basophils, Absolute 06/10/2025 0.03  0.00 - 0.20 10*3/mm3 Final    Immature Grans, Absolute 06/10/2025 0.03  0.00 - 0.05 10*3/mm3 Final    nRBC 06/10/2025 0.0  0.0 - 0.2 /100 WBC Final    Extra Tube 06/10/2025 Hold for add-ons.   Final    Auto resulted.    Extra Tube 06/10/2025 hold for add-on   Final    Auto resulted    Extra Tube 06/10/2025 Hold for add-ons.   Final    Auto resulted.    Extra Tube 06/10/2025 Hold for add-ons.   Final    Auto resulted   Office Visit on 06/05/2025   Component Date Value Ref Range Status    Color 06/05/2025 Dark Yellow  Yellow, Straw, Dark Yellow, Huma Final    Clarity, UA 06/05/2025 Clear  Clear Final    Specific Gravity  06/05/2025 1.030  1.005 - 1.030 Final    pH, Urine 06/05/2025 5.0  5.0 - 8.0 Final     Leukocytes 06/05/2025 Trace (A)  Negative Final    Nitrite, UA 06/05/2025 Negative  Negative Final    Protein, POC 06/05/2025 1+ (A)  Negative mg/dL Final    Glucose, UA 06/05/2025 Trace (A)  Negative mg/dL Final    Ketones, UA 06/05/2025 Trace (A)  Negative Final    Urobilinogen, UA 06/05/2025 Normal  Normal, 0.2 E.U./dL Final    Bilirubin 06/05/2025 Small (1+) (A)  Negative Final    Blood, UA 06/05/2025 3+ (A)  Negative Final    Lot Number 06/05/2025 [id number]   Final    Expiration Date 06/05/2025 10/25/2025   Final    Urine Culture 06/05/2025 No growth   Final   Admission on 05/07/2025, Discharged on 05/08/2025   Component Date Value Ref Range Status    Glucose 05/07/2025 226 (H)  65 - 99 mg/dL Final    BUN 05/07/2025 9  6 - 20 mg/dL Final    Creatinine 05/07/2025 0.98  0.76 - 1.27 mg/dL Final    Sodium 05/07/2025 135 (L)  136 - 145 mmol/L Final    Potassium 05/07/2025 4.3  3.5 - 5.2 mmol/L Final    Slight hemolysis detected by analyzer. Result may be falsely elevated.    Chloride 05/07/2025 99  98 - 107 mmol/L Final    CO2 05/07/2025 25.9  22.0 - 29.0 mmol/L Final    Calcium 05/07/2025 9.3  8.6 - 10.5 mg/dL Final    Total Protein 05/07/2025 7.0  6.0 - 8.5 g/dL Final    Albumin 05/07/2025 3.9  3.5 - 5.2 g/dL Final    ALT (SGPT) 05/07/2025 75 (H)  1 - 41 U/L Final    AST (SGOT) 05/07/2025 68 (H)  1 - 40 U/L Final    Alkaline Phosphatase 05/07/2025 89  39 - 117 U/L Final    Total Bilirubin 05/07/2025 0.3  0.0 - 1.2 mg/dL Final    Globulin 05/07/2025 3.1  gm/dL Final    A/G Ratio 05/07/2025 1.3  g/dL Final    BUN/Creatinine Ratio 05/07/2025 9.2  7.0 - 25.0 Final    Anion Gap 05/07/2025 10.1  5.0 - 15.0 mmol/L Final    eGFR 05/07/2025 92.8  >60.0 mL/min/1.73 Final    C-Reactive Protein 05/07/2025 0.56 (H)  0.00 - 0.50 mg/dL Final    Color, UA 05/07/2025 Yellow  Yellow, Straw Final    Appearance, UA 05/07/2025 Clear  Clear Final    pH, UA 05/07/2025 5.5  5.0 - 8.0 Final    Specific Gravity, UA 05/07/2025 1.022   1.005 - 1.030 Final    Glucose, UA 05/07/2025 500 mg/dL (2+) (A)  Negative Final    Ketones, UA 05/07/2025 Trace (A)  Negative Final    Bilirubin, UA 05/07/2025 Negative  Negative Final    Blood, UA 05/07/2025 Small (1+) (A)  Negative Final    Protein, UA 05/07/2025 Trace (A)  Negative Final    Leuk Esterase, UA 05/07/2025 Negative  Negative Final    Nitrite, UA 05/07/2025 Negative  Negative Final    Urobilinogen, UA 05/07/2025 1.0 E.U./dL  0.2 - 1.0 E.U./dL Final    Extra Tube 05/07/2025 Hold for add-ons.   Final    Auto resulted.    Extra Tube 05/07/2025 hold for add-on   Final    Auto resulted    Extra Tube 05/07/2025 Hold for add-ons.   Final    Auto resulted.    Extra Tube 05/07/2025 Hold for add-ons.   Final    Auto resulted    WBC 05/07/2025 5.63  3.40 - 10.80 10*3/mm3 Final    RBC 05/07/2025 5.14  4.14 - 5.80 10*6/mm3 Final    Hemoglobin 05/07/2025 14.8  13.0 - 17.7 g/dL Final    Hematocrit 05/07/2025 43.4  37.5 - 51.0 % Final    MCV 05/07/2025 84.4  79.0 - 97.0 fL Final    MCH 05/07/2025 28.8  26.6 - 33.0 pg Final    MCHC 05/07/2025 34.1  31.5 - 35.7 g/dL Final    RDW 05/07/2025 12.1 (L)  12.3 - 15.4 % Final    RDW-SD 05/07/2025 36.5 (L)  37.0 - 54.0 fl Final    MPV 05/07/2025 9.5  6.0 - 12.0 fL Final    Platelets 05/07/2025 165  140 - 450 10*3/mm3 Final    Neutrophil % 05/07/2025 58.2  42.7 - 76.0 % Final    Lymphocyte % 05/07/2025 32.1  19.6 - 45.3 % Final    Monocyte % 05/07/2025 6.6  5.0 - 12.0 % Final    Eosinophil % 05/07/2025 2.3  0.3 - 6.2 % Final    Basophil % 05/07/2025 0.4  0.0 - 1.5 % Final    Immature Grans % 05/07/2025 0.4  0.0 - 0.5 % Final    Neutrophils, Absolute 05/07/2025 3.28  1.70 - 7.00 10*3/mm3 Final    Lymphocytes, Absolute 05/07/2025 1.81  0.70 - 3.10 10*3/mm3 Final    Monocytes, Absolute 05/07/2025 0.37  0.10 - 0.90 10*3/mm3 Final    Eosinophils, Absolute 05/07/2025 0.13  0.00 - 0.40 10*3/mm3 Final    Basophils, Absolute 05/07/2025 0.02  0.00 - 0.20 10*3/mm3 Final    Immature  Grans, Absolute 05/07/2025 0.02  0.00 - 0.05 10*3/mm3 Final    nRBC 05/07/2025 0.0  0.0 - 0.2 /100 WBC Final    RBC, UA 05/07/2025 11-20 (A)  None Seen, 0-2 /HPF Final    WBC, UA 05/07/2025 0-2  None Seen, 0-2 /HPF Final    Urine culture not indicated.    Bacteria, UA 05/07/2025 None Seen  None Seen /HPF Final    Squamous Epithelial Cells, UA 05/07/2025 0-2  None Seen, 0-2 /HPF Final    Hyaline Casts, UA 05/07/2025 0-2  None Seen /LPF Final    Methodology 05/07/2025 Automated Microscopy   Final   Office Visit on 03/28/2025   Component Date Value Ref Range Status    Color 03/28/2025 Dark Yellow  Yellow, Straw, Dark Yellow, Huma Final    Clarity, UA 03/28/2025 Clear  Clear Final    Specific Gravity  03/28/2025 1.030  1.005 - 1.030 Final    pH, Urine 03/28/2025 6.0  5.0 - 8.0 Final    Leukocytes 03/28/2025 Negative  Negative Final    Nitrite, UA 03/28/2025 Negative  Negative Final    Protein, POC 03/28/2025 Negative  Negative mg/dL Final    Glucose, UA 03/28/2025 500 mg/dL (A)  Negative mg/dL Final    Ketones, UA 03/28/2025 Negative  Negative Final    Urobilinogen, UA 03/28/2025 Normal  Normal, 0.2 E.U./dL Final    Bilirubin 03/28/2025 Negative  Negative Final    Blood, UA 03/28/2025 3+ (A)  Negative Final    Lot Number 03/28/2025 [id number]   Final    Expiration Date 03/28/2025 52,026   Final        Procedure:       Assessment/Plan:   ***     Patient reports that he is not currently experiencing any symptoms of urinary incontinence.      {Class 3 Severe Obesity (BMI >=40).:[id number]}              This document has been electronically signed by JOSE A SILVEIRA MD June 12, 2025 13:44 EDT    Dictated Utilizing Dragon Dictation: Part of this note may be an electronic transcription/translation of spoken language to printed text using the Dragon Dictation System.

## 2025-06-16 NOTE — PROGRESS NOTES
Chief Complaint:   Gross hematuria    HPI:   54 y.o. male returns today as a 5.1 mm right stone he has gross hematuria and he is due to have surgery with Dr. Hilton I will set him up for cystoscopy ASAP.  He presents back today he refuses a cystoscopy he understands the risk of tumor that can be missed.  He did have an upper tract study that was negative done in the emergency room on Shama 10 but nonetheless we will just see him back if he wishes to proceed with the recommended diagnostic cystoscopy to rule out transitional cell malignancy.    Past Medical History:     Past Medical History:   Diagnosis Date    Back pain     Cancer     PAROTID CANCER    Degenerative joint disease     Diabetes mellitus     DVT, lower extremity     left 2013    GERD (gastroesophageal reflux disease)     Kidney stones     Neuropathy     FEET     Peroneal DVT (deep venous thrombosis)     in 1998 and dvt 2010    Pulmonary embolism     Spinal stenosis     Urolithiasis     Vertigo     OCASSIONALLY       Current Meds:     Current Outpatient Medications   Medication Sig Dispense Refill    bisoprolol-hydrochlorothiazide (ZIAC) 5-6.25 MG per tablet Take 1 tablet by mouth Daily. as directed      cyclobenzaprine (FLEXERIL) 10 MG tablet Take 1 tablet by mouth 3 (Three) Times a Day As Needed for Muscle Spasms.      meclizine (ANTIVERT) 25 MG tablet Take 1 tablet by mouth 3 (Three) Times a Day As Needed for Dizziness. 20 tablet 0    mometasone (Nasonex) 50 MCG/ACT nasal spray 2 sprays into the nostril(s) as directed by provider Daily. 17 g 0    omeprazole (PriLOSEC) 20 MG capsule       ondansetron ODT (ZOFRAN-ODT) 4 MG disintegrating tablet Place 1 tablet on the tongue Every 6 (Six) Hours As Needed for Nausea or Vomiting. 10 tablet 0    oxyCODONE-acetaminophen (PERCOCET)  MG per tablet Take 1 tablet by mouth Every 6 (Six) Hours As Needed.      OxyCONTIN 10 MG 12 hr tablet TAKE ONE TABLET BY MOUTH EVERY NIGHT AT BEDTIME AS NEEDED FOR PAIN *DNF  "04/18/23*      pantoprazole (PROTONIX) 20 MG EC tablet Take 1 tablet by mouth Daily.      prochlorperazine (COMPAZINE) 10 MG tablet Take 1 tablet by mouth Every 6 (Six) Hours As Needed for Nausea or Vomiting. 20 tablet 0    Syringe 25G X 5/8\" 3 ML misc Use as directed 2 x weekly 24 each 3    tamsulosin (FLOMAX) 0.4 MG capsule 24 hr capsule Take 1 capsule by mouth Daily. 14 capsule 0    Testosterone Cypionate (Depo-Testosterone) 200 MG/ML injection Inject 1/2 cc subcutaneously every Monday and Thursday 10 mL 2    Tirzepatide (Mounjaro) 10 MG/0.5ML solution pen-injector pen Inject 0.5 mL under the skin into the appropriate area as directed 1 (One) Time Per Week.      tiZANidine (ZANAFLEX) 4 MG tablet Take 1 tablet by mouth At Night As Needed for Muscle Spasms.      Insulin Glargine (BASAGLAR KWIKPEN SC) Inject 55 Units under the skin into the appropriate area as directed Every Evening.      Ozempic, 1 MG/DOSE, 4 MG/3ML solution pen-injector INJECT 1MG UNDER THE SKIN EVERY WEEK AS DIRECTED      phentermine 37.5 MG capsule Take 1 capsule by mouth Every Morning.      tamsulosin (Flomax) 0.4 MG capsule 24 hr capsule Take 1 capsule by mouth Every Night. (Patient not taking: Reported on 3/28/2025) 30 capsule 5     No current facility-administered medications for this visit.        Allergies:      No Known Allergies     Past Surgical History:     Past Surgical History:   Procedure Laterality Date    APPENDECTOMY      COLONOSCOPY      CYSTOSCOPY W/ LITHOLAPAXY / EHL      INNER EAR SURGERY      OTHER SURGICAL HISTORY      Mass removed from left side of face    SKIN BIOPSY      URETEROSCOPY LASER LITHOTRIPSY WITH STENT INSERTION Left 3/1/2024    Procedure: URETEROSCOPY LASER LITHOTRIPSY;  Surgeon: Angel Adkins MD;  Location: [facility];  Service: Urology;  Laterality: Left;    VASECTOMY Bilateral 03/04/2020    Procedure: VASECTOMY;  Surgeon: Angel Adkins MD;  Location: [facility];  Service: Urology;  " Laterality: Bilateral;       Social History:     Social History     Socioeconomic History    Marital status:    Tobacco Use    Smoking status: Former     Current packs/day: 0.00     Types: Cigarettes     Start date:      Quit date:      Years since quittin.4    Smokeless tobacco: Former     Types: Chew     Quit date:     Tobacco comments:     occasionally uses chew once a year    Vaping Use    Vaping status: Never Used   Substance and Sexual Activity    Alcohol use: No     Comment: beer occasionally    Drug use: No    Sexual activity: Defer       Family History:     Family History   Problem Relation Age of Onset    Other Other         deveral siblings havd had early onset DVTs    No Known Problems Mother     No Known Problems Father        Review of Systems:     Review of Systems   Constitutional: Negative.    HENT: Negative.     Eyes: Negative.    Respiratory: Negative.     Cardiovascular: Negative.    Gastrointestinal: Negative.    Endocrine: Negative.    Musculoskeletal: Negative.    Allergic/Immunologic: Negative.    Neurological: Negative.    Hematological: Negative.    Psychiatric/Behavioral: Negative.         Physical Exam:     Physical Exam  Vitals and nursing note reviewed.   Constitutional:       Appearance: He is well-developed.   HENT:      Head: Normocephalic and atraumatic.   Eyes:      Conjunctiva/sclera: Conjunctivae normal.      Pupils: Pupils are equal, round, and reactive to light.   Cardiovascular:      Rate and Rhythm: Normal rate and regular rhythm.      Heart sounds: Normal heart sounds.   Pulmonary:      Effort: Pulmonary effort is normal.      Breath sounds: Normal breath sounds.   Abdominal:      General: Bowel sounds are normal.      Palpations: Abdomen is soft.   Musculoskeletal:         General: Normal range of motion.      Cervical back: Normal range of motion.   Skin:     General: Skin is warm and dry.   Neurological:      Mental Status: He is alert and oriented  to person, place, and time.      Deep Tendon Reflexes: Reflexes are normal and symmetric.   Psychiatric:         Behavior: Behavior normal.         Thought Content: Thought content normal.         Judgment: Judgment normal.         I have reviewed the following portions of the patient's history: Allergies, current medications, past family history, past medical history, past social history, past surgical history, problem list, and ROS and confirm it is accurate.    Recent Image (CT and/or KUB):      CT Abdomen and Pelvis: No results found for this or any previous visit.       CT Stone Protocol: Results for orders placed during the hospital encounter of 06/10/25    CT Abdomen Pelvis Stone Protocol    Narrative  PROCEDURE: CT of the abdomen and pelvis performed without IV contrast on  Shama 10, 2025. Examination was performed with 4 mm axial imaging and  sagittal and coronal reconstruction images. Examination was performed  according to as low as reasonably achievable dose protocol.    HISTORY: Nausea. Right flank pain.    COMPARISON: None.    FINDINGS:    Small bands of scar versus atelectasis at the left lung base.  Normal heart size.  No acute process seen in the liver, spleen, gallbladder, pancreas, and  adrenal glands.  Normal appendix is well seen.  Mild left colon and sigmoid colon diverticulosis.  Very small umbilical hernia contains only fat.  Mild prominence of the size of the prostate gland  The bladder appears unremarkable.  No free fluid or free air. No abscess or hematoma.    Right nephrolithiasis. There is a 6.4 mm stone in the lower pole of the  right kidney seen best on image 56, series 2. This appears  nonobstructive.  No ureter stone identified.  No hydronephrosis or perinephric stranding.  No bladder stone identified to suggest recently passed stone.    Impression  Nonobstructive right nephrolithiasis.  No ureter or bladder stone.  No hydronephrosis or perinephric stranding.  Mild left colon and  sigmoid colon diverticulosis.  Very small umbilical hernia contains only fat.  Normal appendix.  Small bands of scar versus atelectasis at the left lung base        This report was finalized on 6/10/2025 4:56 AM by Arnol Tee MD.       KUB: Results for orders placed during the hospital encounter of 03/28/25    XR Abdomen KUB    Narrative  EXAM:  XR Abdomen, 1 View    EXAM DATE:  3/28/2025 1:18 PM    CLINICAL HISTORY:  flank pain; N20.1-Calculus of ureter    TECHNIQUE:  Frontal supine view of the abdomen/pelvis.    COMPARISON:  No relevant prior studies available.    FINDINGS:  Gastrointestinal tract:  Unremarkable as visualized.  No dilation.  Bones/joints:  Unremarkable as visualized.  No acute fracture.    Impression  Unremarkable abdominal x-ray.    This report was finalized on 3/28/2025 1:18 PM by Dr. Cristhian Norman MD.       Labs (past 3 months):      Admission on 06/10/2025, Discharged on 06/10/2025   Component Date Value Ref Range Status    Glucose 06/10/2025 228 (H)  65 - 99 mg/dL Final    BUN 06/10/2025 15.3  6.0 - 20.0 mg/dL Final    Creatinine 06/10/2025 0.96  0.76 - 1.27 mg/dL Final    Sodium 06/10/2025 135 (L)  136 - 145 mmol/L Final    Potassium 06/10/2025 3.9  3.5 - 5.2 mmol/L Final    Chloride 06/10/2025 101  98 - 107 mmol/L Final    CO2 06/10/2025 23.6  22.0 - 29.0 mmol/L Final    Calcium 06/10/2025 9.1  8.6 - 10.5 mg/dL Final    Total Protein 06/10/2025 6.6  6.0 - 8.5 g/dL Final    Albumin 06/10/2025 3.9  3.5 - 5.2 g/dL Final    ALT (SGPT) 06/10/2025 48 (H)  1 - 41 U/L Final    AST (SGOT) 06/10/2025 31  1 - 40 U/L Final    Alkaline Phosphatase 06/10/2025 103  39 - 117 U/L Final    Total Bilirubin 06/10/2025 0.3  0.0 - 1.2 mg/dL Final    Globulin 06/10/2025 2.7  gm/dL Final    A/G Ratio 06/10/2025 1.4  g/dL Final    BUN/Creatinine Ratio 06/10/2025 15.9  7.0 - 25.0 Final    Anion Gap 06/10/2025 10.4  5.0 - 15.0 mmol/L Final    eGFR 06/10/2025 95.1  >60.0 mL/min/1.73 Final    Lipase 06/10/2025 43   13 - 60 U/L Final    C-Reactive Protein 06/10/2025 3.05 (H)  0.00 - 0.50 mg/dL Final    WBC 06/10/2025 7.37  3.40 - 10.80 10*3/mm3 Final    RBC 06/10/2025 4.96  4.14 - 5.80 10*6/mm3 Final    Hemoglobin 06/10/2025 14.2  13.0 - 17.7 g/dL Final    Hematocrit 06/10/2025 41.6  37.5 - 51.0 % Final    MCV 06/10/2025 83.9  79.0 - 97.0 fL Final    MCH 06/10/2025 28.6  26.6 - 33.0 pg Final    MCHC 06/10/2025 34.1  31.5 - 35.7 g/dL Final    RDW 06/10/2025 12.5  12.3 - 15.4 % Final    RDW-SD 06/10/2025 37.5  37.0 - 54.0 fl Final    MPV 06/10/2025 9.3  6.0 - 12.0 fL Final    Platelets 06/10/2025 165  140 - 450 10*3/mm3 Final    Neutrophil % 06/10/2025 68.7  42.7 - 76.0 % Final    Lymphocyte % 06/10/2025 22.9  19.6 - 45.3 % Final    Monocyte % 06/10/2025 5.7  5.0 - 12.0 % Final    Eosinophil % 06/10/2025 1.9  0.3 - 6.2 % Final    Basophil % 06/10/2025 0.4  0.0 - 1.5 % Final    Immature Grans % 06/10/2025 0.4  0.0 - 0.5 % Final    Neutrophils, Absolute 06/10/2025 5.06  1.70 - 7.00 10*3/mm3 Final    Lymphocytes, Absolute 06/10/2025 1.69  0.70 - 3.10 10*3/mm3 Final    Monocytes, Absolute 06/10/2025 0.42  0.10 - 0.90 10*3/mm3 Final    Eosinophils, Absolute 06/10/2025 0.14  0.00 - 0.40 10*3/mm3 Final    Basophils, Absolute 06/10/2025 0.03  0.00 - 0.20 10*3/mm3 Final    Immature Grans, Absolute 06/10/2025 0.03  0.00 - 0.05 10*3/mm3 Final    nRBC 06/10/2025 0.0  0.0 - 0.2 /100 WBC Final    Extra Tube 06/10/2025 Hold for add-ons.   Final    Auto resulted.    Extra Tube 06/10/2025 hold for add-on   Final    Auto resulted    Extra Tube 06/10/2025 Hold for add-ons.   Final    Auto resulted.    Extra Tube 06/10/2025 Hold for add-ons.   Final    Auto resulted   Office Visit on 06/05/2025   Component Date Value Ref Range Status    Color 06/05/2025 Dark Yellow  Yellow, Straw, Dark Yellow, Huma Final    Clarity, UA 06/05/2025 Clear  Clear Final    Specific Gravity  06/05/2025 1.030  1.005 - 1.030 Final    pH, Urine 06/05/2025 5.0  5.0 - 8.0  Final    Leukocytes 06/05/2025 Trace (A)  Negative Final    Nitrite, UA 06/05/2025 Negative  Negative Final    Protein, POC 06/05/2025 1+ (A)  Negative mg/dL Final    Glucose, UA 06/05/2025 Trace (A)  Negative mg/dL Final    Ketones, UA 06/05/2025 Trace (A)  Negative Final    Urobilinogen, UA 06/05/2025 Normal  Normal, 0.2 E.U./dL Final    Bilirubin 06/05/2025 Small (1+) (A)  Negative Final    Blood, UA 06/05/2025 3+ (A)  Negative Final    Lot Number 06/05/2025 [id number]   Final    Expiration Date 06/05/2025 10/25/2025   Final    Urine Culture 06/05/2025 No growth   Final   Admission on 05/07/2025, Discharged on 05/08/2025   Component Date Value Ref Range Status    Glucose 05/07/2025 226 (H)  65 - 99 mg/dL Final    BUN 05/07/2025 9  6 - 20 mg/dL Final    Creatinine 05/07/2025 0.98  0.76 - 1.27 mg/dL Final    Sodium 05/07/2025 135 (L)  136 - 145 mmol/L Final    Potassium 05/07/2025 4.3  3.5 - 5.2 mmol/L Final    Slight hemolysis detected by analyzer. Result may be falsely elevated.    Chloride 05/07/2025 99  98 - 107 mmol/L Final    CO2 05/07/2025 25.9  22.0 - 29.0 mmol/L Final    Calcium 05/07/2025 9.3  8.6 - 10.5 mg/dL Final    Total Protein 05/07/2025 7.0  6.0 - 8.5 g/dL Final    Albumin 05/07/2025 3.9  3.5 - 5.2 g/dL Final    ALT (SGPT) 05/07/2025 75 (H)  1 - 41 U/L Final    AST (SGOT) 05/07/2025 68 (H)  1 - 40 U/L Final    Alkaline Phosphatase 05/07/2025 89  39 - 117 U/L Final    Total Bilirubin 05/07/2025 0.3  0.0 - 1.2 mg/dL Final    Globulin 05/07/2025 3.1  gm/dL Final    A/G Ratio 05/07/2025 1.3  g/dL Final    BUN/Creatinine Ratio 05/07/2025 9.2  7.0 - 25.0 Final    Anion Gap 05/07/2025 10.1  5.0 - 15.0 mmol/L Final    eGFR 05/07/2025 92.8  >60.0 mL/min/1.73 Final    C-Reactive Protein 05/07/2025 0.56 (H)  0.00 - 0.50 mg/dL Final    Color, UA 05/07/2025 Yellow  Yellow, Straw Final    Appearance, UA 05/07/2025 Clear  Clear Final    pH, UA 05/07/2025 5.5  5.0 - 8.0 Final    Specific Gravity, UA 05/07/2025  1.022  1.005 - 1.030 Final    Glucose, UA 05/07/2025 500 mg/dL (2+) (A)  Negative Final    Ketones, UA 05/07/2025 Trace (A)  Negative Final    Bilirubin, UA 05/07/2025 Negative  Negative Final    Blood, UA 05/07/2025 Small (1+) (A)  Negative Final    Protein, UA 05/07/2025 Trace (A)  Negative Final    Leuk Esterase, UA 05/07/2025 Negative  Negative Final    Nitrite, UA 05/07/2025 Negative  Negative Final    Urobilinogen, UA 05/07/2025 1.0 E.U./dL  0.2 - 1.0 E.U./dL Final    Extra Tube 05/07/2025 Hold for add-ons.   Final    Auto resulted.    Extra Tube 05/07/2025 hold for add-on   Final    Auto resulted    Extra Tube 05/07/2025 Hold for add-ons.   Final    Auto resulted.    Extra Tube 05/07/2025 Hold for add-ons.   Final    Auto resulted    WBC 05/07/2025 5.63  3.40 - 10.80 10*3/mm3 Final    RBC 05/07/2025 5.14  4.14 - 5.80 10*6/mm3 Final    Hemoglobin 05/07/2025 14.8  13.0 - 17.7 g/dL Final    Hematocrit 05/07/2025 43.4  37.5 - 51.0 % Final    MCV 05/07/2025 84.4  79.0 - 97.0 fL Final    MCH 05/07/2025 28.8  26.6 - 33.0 pg Final    MCHC 05/07/2025 34.1  31.5 - 35.7 g/dL Final    RDW 05/07/2025 12.1 (L)  12.3 - 15.4 % Final    RDW-SD 05/07/2025 36.5 (L)  37.0 - 54.0 fl Final    MPV 05/07/2025 9.5  6.0 - 12.0 fL Final    Platelets 05/07/2025 165  140 - 450 10*3/mm3 Final    Neutrophil % 05/07/2025 58.2  42.7 - 76.0 % Final    Lymphocyte % 05/07/2025 32.1  19.6 - 45.3 % Final    Monocyte % 05/07/2025 6.6  5.0 - 12.0 % Final    Eosinophil % 05/07/2025 2.3  0.3 - 6.2 % Final    Basophil % 05/07/2025 0.4  0.0 - 1.5 % Final    Immature Grans % 05/07/2025 0.4  0.0 - 0.5 % Final    Neutrophils, Absolute 05/07/2025 3.28  1.70 - 7.00 10*3/mm3 Final    Lymphocytes, Absolute 05/07/2025 1.81  0.70 - 3.10 10*3/mm3 Final    Monocytes, Absolute 05/07/2025 0.37  0.10 - 0.90 10*3/mm3 Final    Eosinophils, Absolute 05/07/2025 0.13  0.00 - 0.40 10*3/mm3 Final    Basophils, Absolute 05/07/2025 0.02  0.00 - 0.20 10*3/mm3 Final     Immature Grans, Absolute 05/07/2025 0.02  0.00 - 0.05 10*3/mm3 Final    nRBC 05/07/2025 0.0  0.0 - 0.2 /100 WBC Final    RBC, UA 05/07/2025 11-20 (A)  None Seen, 0-2 /HPF Final    WBC, UA 05/07/2025 0-2  None Seen, 0-2 /HPF Final    Urine culture not indicated.    Bacteria, UA 05/07/2025 None Seen  None Seen /HPF Final    Squamous Epithelial Cells, UA 05/07/2025 0-2  None Seen, 0-2 /HPF Final    Hyaline Casts, UA 05/07/2025 0-2  None Seen /LPF Final    Methodology 05/07/2025 Automated Microscopy   Final   Office Visit on 03/28/2025   Component Date Value Ref Range Status    Color 03/28/2025 Dark Yellow  Yellow, Straw, Dark Yellow, Huma Final    Clarity, UA 03/28/2025 Clear  Clear Final    Specific Gravity  03/28/2025 1.030  1.005 - 1.030 Final    pH, Urine 03/28/2025 6.0  5.0 - 8.0 Final    Leukocytes 03/28/2025 Negative  Negative Final    Nitrite, UA 03/28/2025 Negative  Negative Final    Protein, POC 03/28/2025 Negative  Negative mg/dL Final    Glucose, UA 03/28/2025 500 mg/dL (A)  Negative mg/dL Final    Ketones, UA 03/28/2025 Negative  Negative Final    Urobilinogen, UA 03/28/2025 Normal  Normal, 0.2 E.U./dL Final    Bilirubin 03/28/2025 Negative  Negative Final    Blood, UA 03/28/2025 3+ (A)  Negative Final    Lot Number 03/28/2025 [id number]   Final    Expiration Date 03/28/2025 52,026   Final        Procedure:       Assessment/Plan:   Hematuria-patient was diagnosed with hematuria.  We discussed the significance of microscopic hematuria versus gross hematuria.  We discussed the presence or absence of the type of clotting identified including vermiform clots consistent with ureteral bleeding versus just pink-tinged urine versus rey clots.  We discussed the presence of urokinase in the urine which causes the clots to dissolve with time.  We discussed the fact that it takes only a very small amount of blood in the urine to make the urine very red appearing and therefore give one the impression that there  is much more blood loss that is really present.  I discussed the use of both an upper and lower tract investigation.  I discussed the fact that an upper tract investigation includes a normal renal ultrasound with a significant risk of missing more subtle lesions.  Progressing to a CT scan without contrast and finally the CT scan with contrast being the gold standard to diagnose the small neoplasms.  We discussed the lower tract investigation consisting of a cystoscopy in many of the cases where the upper tract study is negative.  Also discussed the fact that if there is a contraindication to the use of contrast we would do a noncontrasted study and this also has a chance of missing small lesions.  The specific instance would be cases of diabetes and chronic renal insufficiency.  Discussed the fact that there is about a 96% chance of a negative workup with episodes of microscopic hematuria and with much greater in the face of gross hematuria.  We discussed the fact that this is a non-cumulative test.  In other words, if there is hematuria next year I would recommend continuing to work up the condition because of the fact that neoplasms may be small at the first workup and easily are missed.  I discussed the differential diagnosis of hematuria including trauma, neoplasia, infection, etc.  We discussed the fact that if there is any history of chronic kidney disease or risk factors such as diabetes for contrast a noncontrasted study will be utilized.  We will initiate an investigation.  He had a negative upper tract study he refuses a lower tract investigation certainly his prerogative understands the increased risk of missing out transitional cell malignancy although it is relatively small he does understand this.            This document has been electronically signed by JOSE A SILVEIRA MD June 16, 2025 06:47 EDT    Dictated Utilizing Dragon Dictation: Part of this note may be an electronic  transcription/translation of spoken language to printed text using the Dragon Dictation System.

## 2025-08-28 ENCOUNTER — APPOINTMENT (OUTPATIENT)
Dept: CT IMAGING | Facility: HOSPITAL | Age: 55
End: 2025-08-28
Payer: MEDICAID

## 2025-08-28 ENCOUNTER — HOSPITAL ENCOUNTER (EMERGENCY)
Facility: HOSPITAL | Age: 55
Discharge: HOME | End: 2025-08-28
Attending: STUDENT IN AN ORGANIZED HEALTH CARE EDUCATION/TRAINING PROGRAM | Admitting: STUDENT IN AN ORGANIZED HEALTH CARE EDUCATION/TRAINING PROGRAM
Payer: MEDICAID

## 2025-08-28 VITALS
OXYGEN SATURATION: 97 % | TEMPERATURE: 97.7 F | WEIGHT: 285 LBS | SYSTOLIC BLOOD PRESSURE: 119 MMHG | HEART RATE: 73 BPM | HEIGHT: 70 IN | RESPIRATION RATE: 18 BRPM | BODY MASS INDEX: 40.8 KG/M2 | DIASTOLIC BLOOD PRESSURE: 80 MMHG

## 2025-08-28 DIAGNOSIS — R10.9 FLANK PAIN: ICD-10-CM

## 2025-08-28 DIAGNOSIS — R31.9 HEMATURIA, UNSPECIFIED TYPE: ICD-10-CM

## 2025-08-28 DIAGNOSIS — N39.0 ACUTE UTI (URINARY TRACT INFECTION): Primary | ICD-10-CM

## 2025-08-28 LAB
ALBUMIN SERPL-MCNC: 3.9 G/DL (ref 3.5–5.2)
ALBUMIN/GLOB SERPL: 1.4 G/DL
ALP SERPL-CCNC: 103 U/L (ref 39–117)
ALT SERPL W P-5'-P-CCNC: 115 U/L (ref 1–41)
ANION GAP SERPL CALCULATED.3IONS-SCNC: 11.9 MMOL/L (ref 5–15)
AST SERPL-CCNC: 119 U/L (ref 1–40)
BACTERIA UR QL AUTO: ABNORMAL /HPF
BASOPHILS # BLD AUTO: 0.04 10*3/MM3 (ref 0–0.2)
BASOPHILS NFR BLD AUTO: 0.5 % (ref 0–1.5)
BILIRUB SERPL-MCNC: 0.3 MG/DL (ref 0–1.2)
BILIRUB UR QL STRIP: NEGATIVE
BUN SERPL-MCNC: 9.1 MG/DL (ref 6–20)
BUN/CREAT SERPL: 8.4 (ref 7–25)
CALCIUM SPEC-SCNC: 9.5 MG/DL (ref 8.6–10.5)
CHLORIDE SERPL-SCNC: 100 MMOL/L (ref 98–107)
CLARITY UR: ABNORMAL
CO2 SERPL-SCNC: 22.1 MMOL/L (ref 22–29)
COD CRY URNS QL: ABNORMAL /HPF
COLOR UR: ABNORMAL
CREAT SERPL-MCNC: 1.08 MG/DL (ref 0.76–1.27)
DEPRECATED RDW RBC AUTO: 37.2 FL (ref 37–54)
EGFRCR SERPLBLD CKD-EPI 2021: 82.1 ML/MIN/1.73
EOSINOPHIL # BLD AUTO: 0.14 10*3/MM3 (ref 0–0.4)
EOSINOPHIL NFR BLD AUTO: 1.8 % (ref 0.3–6.2)
ERYTHROCYTE [DISTWIDTH] IN BLOOD BY AUTOMATED COUNT: 11.9 % (ref 12.3–15.4)
GLOBULIN UR ELPH-MCNC: 2.7 GM/DL
GLUCOSE SERPL-MCNC: 259 MG/DL (ref 65–99)
GLUCOSE UR STRIP-MCNC: ABNORMAL MG/DL
HCT VFR BLD AUTO: 45.1 % (ref 37.5–51)
HGB BLD-MCNC: 15.6 G/DL (ref 13–17.7)
HGB UR QL STRIP.AUTO: ABNORMAL
HOLD SPECIMEN: NORMAL
HYALINE CASTS UR QL AUTO: ABNORMAL /LPF
IMM GRANULOCYTES # BLD AUTO: 0.02 10*3/MM3 (ref 0–0.05)
IMM GRANULOCYTES NFR BLD AUTO: 0.3 % (ref 0–0.5)
KETONES UR QL STRIP: ABNORMAL
LEUKOCYTE ESTERASE UR QL STRIP.AUTO: ABNORMAL
LIPASE SERPL-CCNC: 35 U/L (ref 13–60)
LYMPHOCYTES # BLD AUTO: 2.2 10*3/MM3 (ref 0.7–3.1)
LYMPHOCYTES NFR BLD AUTO: 28.3 % (ref 19.6–45.3)
MCH RBC QN AUTO: 29.6 PG (ref 26.6–33)
MCHC RBC AUTO-ENTMCNC: 34.6 G/DL (ref 31.5–35.7)
MCV RBC AUTO: 85.6 FL (ref 79–97)
MONOCYTES # BLD AUTO: 0.47 10*3/MM3 (ref 0.1–0.9)
MONOCYTES NFR BLD AUTO: 6 % (ref 5–12)
NEUTROPHILS NFR BLD AUTO: 4.91 10*3/MM3 (ref 1.7–7)
NEUTROPHILS NFR BLD AUTO: 63.1 % (ref 42.7–76)
NITRITE UR QL STRIP: NEGATIVE
NRBC BLD AUTO-RTO: 0 /100 WBC (ref 0–0.2)
PH UR STRIP.AUTO: <=5 [PH] (ref 5–8)
PLATELET # BLD AUTO: 177 10*3/MM3 (ref 140–450)
PMV BLD AUTO: 9.3 FL (ref 6–12)
POTASSIUM SERPL-SCNC: 4.3 MMOL/L (ref 3.5–5.2)
PROT SERPL-MCNC: 6.6 G/DL (ref 6–8.5)
PROT UR QL STRIP: ABNORMAL
RBC # BLD AUTO: 5.27 10*6/MM3 (ref 4.14–5.8)
RBC # UR STRIP: ABNORMAL /HPF
REF LAB TEST METHOD: ABNORMAL
SODIUM SERPL-SCNC: 134 MMOL/L (ref 136–145)
SP GR UR STRIP: >1.03 (ref 1–1.03)
SQUAMOUS #/AREA URNS HPF: ABNORMAL /HPF
UROBILINOGEN UR QL STRIP: ABNORMAL
WBC # UR STRIP: ABNORMAL /HPF
WBC NRBC COR # BLD AUTO: 7.78 10*3/MM3 (ref 3.4–10.8)

## 2025-08-28 PROCEDURE — 96376 TX/PRO/DX INJ SAME DRUG ADON: CPT

## 2025-08-28 PROCEDURE — 83690 ASSAY OF LIPASE: CPT | Performed by: STUDENT IN AN ORGANIZED HEALTH CARE EDUCATION/TRAINING PROGRAM

## 2025-08-28 PROCEDURE — 74176 CT ABD & PELVIS W/O CONTRAST: CPT

## 2025-08-28 PROCEDURE — 80053 COMPREHEN METABOLIC PANEL: CPT | Performed by: STUDENT IN AN ORGANIZED HEALTH CARE EDUCATION/TRAINING PROGRAM

## 2025-08-28 PROCEDURE — 99284 EMERGENCY DEPT VISIT MOD MDM: CPT

## 2025-08-28 PROCEDURE — 96375 TX/PRO/DX INJ NEW DRUG ADDON: CPT

## 2025-08-28 PROCEDURE — 25010000002 KETOROLAC TROMETHAMINE PER 15 MG: Performed by: STUDENT IN AN ORGANIZED HEALTH CARE EDUCATION/TRAINING PROGRAM

## 2025-08-28 PROCEDURE — 85025 COMPLETE CBC W/AUTO DIFF WBC: CPT | Performed by: STUDENT IN AN ORGANIZED HEALTH CARE EDUCATION/TRAINING PROGRAM

## 2025-08-28 PROCEDURE — 81001 URINALYSIS AUTO W/SCOPE: CPT | Performed by: STUDENT IN AN ORGANIZED HEALTH CARE EDUCATION/TRAINING PROGRAM

## 2025-08-28 PROCEDURE — 96374 THER/PROPH/DIAG INJ IV PUSH: CPT

## 2025-08-28 PROCEDURE — 36415 COLL VENOUS BLD VENIPUNCTURE: CPT

## 2025-08-28 PROCEDURE — 96361 HYDRATE IV INFUSION ADD-ON: CPT

## 2025-08-28 PROCEDURE — 25810000003 SODIUM CHLORIDE 0.9 % SOLUTION: Performed by: STUDENT IN AN ORGANIZED HEALTH CARE EDUCATION/TRAINING PROGRAM

## 2025-08-28 PROCEDURE — 25010000002 ONDANSETRON PER 1 MG: Performed by: STUDENT IN AN ORGANIZED HEALTH CARE EDUCATION/TRAINING PROGRAM

## 2025-08-28 RX ORDER — KETOROLAC TROMETHAMINE 30 MG/ML
15 INJECTION, SOLUTION INTRAMUSCULAR; INTRAVENOUS ONCE
Status: COMPLETED | OUTPATIENT
Start: 2025-08-28 | End: 2025-08-28

## 2025-08-28 RX ORDER — OXYCODONE AND ACETAMINOPHEN 5; 325 MG/1; MG/1
1 TABLET ORAL ONCE
Refills: 0 | Status: COMPLETED | OUTPATIENT
Start: 2025-08-28 | End: 2025-08-28

## 2025-08-28 RX ORDER — ONDANSETRON 2 MG/ML
4 INJECTION INTRAMUSCULAR; INTRAVENOUS ONCE
Status: COMPLETED | OUTPATIENT
Start: 2025-08-28 | End: 2025-08-28

## 2025-08-28 RX ORDER — CEFPODOXIME PROXETIL 200 MG/1
400 TABLET, FILM COATED ORAL 2 TIMES DAILY
Qty: 40 TABLET | Refills: 0 | Status: SHIPPED | OUTPATIENT
Start: 2025-08-28 | End: 2025-09-07

## 2025-08-28 RX ADMIN — KETOROLAC TROMETHAMINE 15 MG: 30 INJECTION INTRAMUSCULAR; INTRAVENOUS at 22:55

## 2025-08-28 RX ADMIN — ONDANSETRON 4 MG: 2 INJECTION, SOLUTION INTRAMUSCULAR; INTRAVENOUS at 20:19

## 2025-08-28 RX ADMIN — SODIUM CHLORIDE 1000 ML: 9 INJECTION, SOLUTION INTRAVENOUS at 20:29

## 2025-08-28 RX ADMIN — OXYCODONE AND ACETAMINOPHEN 1 TABLET: 5; 325 TABLET ORAL at 22:55

## 2025-08-28 RX ADMIN — CEPHALEXIN 500 MG: 250 CAPSULE ORAL at 23:39

## 2025-08-28 RX ADMIN — KETOROLAC TROMETHAMINE 15 MG: 30 INJECTION INTRAMUSCULAR; INTRAVENOUS at 20:19

## 2025-08-29 ENCOUNTER — HOSPITAL ENCOUNTER (EMERGENCY)
Facility: HOSPITAL | Age: 55
Discharge: OTHER INSTITUTION | End: 2025-08-29
Attending: STUDENT IN AN ORGANIZED HEALTH CARE EDUCATION/TRAINING PROGRAM
Payer: MEDICAID

## 2025-08-29 ENCOUNTER — APPOINTMENT (OUTPATIENT)
Dept: CT IMAGING | Facility: HOSPITAL | Age: 55
End: 2025-08-29
Payer: MEDICAID

## 2025-08-29 VITALS
SYSTOLIC BLOOD PRESSURE: 116 MMHG | HEIGHT: 70 IN | BODY MASS INDEX: 40.8 KG/M2 | TEMPERATURE: 97.8 F | HEART RATE: 67 BPM | RESPIRATION RATE: 14 BRPM | OXYGEN SATURATION: 93 % | WEIGHT: 285 LBS | DIASTOLIC BLOOD PRESSURE: 58 MMHG

## 2025-08-29 DIAGNOSIS — N20.0 RENAL STONE: Primary | ICD-10-CM

## 2025-08-29 LAB
ALBUMIN SERPL-MCNC: 3.8 G/DL (ref 3.5–5.2)
ALBUMIN/GLOB SERPL: 1.5 G/DL
ALP SERPL-CCNC: 90 U/L (ref 39–117)
ALT SERPL W P-5'-P-CCNC: 89 U/L (ref 1–41)
ANION GAP SERPL CALCULATED.3IONS-SCNC: 9.4 MMOL/L (ref 5–15)
AST SERPL-CCNC: 68 U/L (ref 1–40)
BASOPHILS # BLD AUTO: 0.02 10*3/MM3 (ref 0–0.2)
BASOPHILS NFR BLD AUTO: 0.3 % (ref 0–1.5)
BILIRUB SERPL-MCNC: 0.4 MG/DL (ref 0–1.2)
BUN SERPL-MCNC: 15 MG/DL (ref 6–20)
BUN/CREAT SERPL: 14 (ref 7–25)
CALCIUM SPEC-SCNC: 9.2 MG/DL (ref 8.6–10.5)
CHLORIDE SERPL-SCNC: 102 MMOL/L (ref 98–107)
CO2 SERPL-SCNC: 22.6 MMOL/L (ref 22–29)
CREAT SERPL-MCNC: 1.07 MG/DL (ref 0.76–1.27)
DEPRECATED RDW RBC AUTO: 36.5 FL (ref 37–54)
EGFRCR SERPLBLD CKD-EPI 2021: 83 ML/MIN/1.73
EOSINOPHIL # BLD AUTO: 0.15 10*3/MM3 (ref 0–0.4)
EOSINOPHIL NFR BLD AUTO: 2.6 % (ref 0.3–6.2)
ERYTHROCYTE [DISTWIDTH] IN BLOOD BY AUTOMATED COUNT: 11.9 % (ref 12.3–15.4)
GLOBULIN UR ELPH-MCNC: 2.5 GM/DL
GLUCOSE BLDC GLUCOMTR-MCNC: 298 MG/DL (ref 70–130)
GLUCOSE SERPL-MCNC: 287 MG/DL (ref 65–99)
HCT VFR BLD AUTO: 43 % (ref 37.5–51)
HGB BLD-MCNC: 14.7 G/DL (ref 13–17.7)
HOLD SPECIMEN: NORMAL
HOLD SPECIMEN: NORMAL
IMM GRANULOCYTES # BLD AUTO: 0.02 10*3/MM3 (ref 0–0.05)
IMM GRANULOCYTES NFR BLD AUTO: 0.3 % (ref 0–0.5)
LYMPHOCYTES # BLD AUTO: 1.59 10*3/MM3 (ref 0.7–3.1)
LYMPHOCYTES NFR BLD AUTO: 27.2 % (ref 19.6–45.3)
MCH RBC QN AUTO: 29.2 PG (ref 26.6–33)
MCHC RBC AUTO-ENTMCNC: 34.2 G/DL (ref 31.5–35.7)
MCV RBC AUTO: 85.5 FL (ref 79–97)
MONOCYTES # BLD AUTO: 0.39 10*3/MM3 (ref 0.1–0.9)
MONOCYTES NFR BLD AUTO: 6.7 % (ref 5–12)
NEUTROPHILS NFR BLD AUTO: 3.67 10*3/MM3 (ref 1.7–7)
NEUTROPHILS NFR BLD AUTO: 62.9 % (ref 42.7–76)
NRBC BLD AUTO-RTO: 0 /100 WBC (ref 0–0.2)
PLATELET # BLD AUTO: 138 10*3/MM3 (ref 140–450)
PMV BLD AUTO: 9.3 FL (ref 6–12)
POTASSIUM SERPL-SCNC: 4.2 MMOL/L (ref 3.5–5.2)
PROT SERPL-MCNC: 6.3 G/DL (ref 6–8.5)
RBC # BLD AUTO: 5.03 10*6/MM3 (ref 4.14–5.8)
SODIUM SERPL-SCNC: 134 MMOL/L (ref 136–145)
WBC NRBC COR # BLD AUTO: 5.84 10*3/MM3 (ref 3.4–10.8)
WHOLE BLOOD HOLD COAG: NORMAL
WHOLE BLOOD HOLD SPECIMEN: NORMAL

## 2025-08-29 PROCEDURE — 85025 COMPLETE CBC W/AUTO DIFF WBC: CPT | Performed by: PHYSICIAN ASSISTANT

## 2025-08-29 PROCEDURE — 25010000002 MORPHINE PER 10 MG: Performed by: STUDENT IN AN ORGANIZED HEALTH CARE EDUCATION/TRAINING PROGRAM

## 2025-08-29 PROCEDURE — 25010000002 HYDROMORPHONE 1 MG/ML SOLUTION: Performed by: STUDENT IN AN ORGANIZED HEALTH CARE EDUCATION/TRAINING PROGRAM

## 2025-08-29 PROCEDURE — 25010000002 ONDANSETRON PER 1 MG: Performed by: STUDENT IN AN ORGANIZED HEALTH CARE EDUCATION/TRAINING PROGRAM

## 2025-08-29 PROCEDURE — 80053 COMPREHEN METABOLIC PANEL: CPT | Performed by: PHYSICIAN ASSISTANT

## 2025-08-29 PROCEDURE — 25010000002 KETOROLAC TROMETHAMINE PER 15 MG: Performed by: STUDENT IN AN ORGANIZED HEALTH CARE EDUCATION/TRAINING PROGRAM

## 2025-08-29 PROCEDURE — 74176 CT ABD & PELVIS W/O CONTRAST: CPT

## 2025-08-29 PROCEDURE — 82948 REAGENT STRIP/BLOOD GLUCOSE: CPT

## 2025-08-29 PROCEDURE — 25010000002 PROCHLORPERAZINE 10 MG/2ML SOLUTION: Performed by: STUDENT IN AN ORGANIZED HEALTH CARE EDUCATION/TRAINING PROGRAM

## 2025-08-29 PROCEDURE — 25810000003 SODIUM CHLORIDE 0.9 % SOLUTION: Performed by: STUDENT IN AN ORGANIZED HEALTH CARE EDUCATION/TRAINING PROGRAM

## 2025-08-29 RX ORDER — KETOROLAC TROMETHAMINE 30 MG/ML
30 INJECTION, SOLUTION INTRAMUSCULAR; INTRAVENOUS ONCE
Status: COMPLETED | OUTPATIENT
Start: 2025-08-29 | End: 2025-08-29

## 2025-08-29 RX ORDER — PROCHLORPERAZINE EDISYLATE 5 MG/ML
10 INJECTION INTRAMUSCULAR; INTRAVENOUS ONCE
Status: COMPLETED | OUTPATIENT
Start: 2025-08-29 | End: 2025-08-29

## 2025-08-29 RX ORDER — ONDANSETRON 2 MG/ML
4 INJECTION INTRAMUSCULAR; INTRAVENOUS ONCE
Status: COMPLETED | OUTPATIENT
Start: 2025-08-29 | End: 2025-08-29

## 2025-08-29 RX ORDER — TAMSULOSIN HYDROCHLORIDE 0.4 MG/1
0.4 CAPSULE ORAL ONCE
Status: COMPLETED | OUTPATIENT
Start: 2025-08-29 | End: 2025-08-29

## 2025-08-29 RX ORDER — OXYCODONE AND ACETAMINOPHEN 5; 325 MG/1; MG/1
2 TABLET ORAL ONCE
Refills: 0 | Status: COMPLETED | OUTPATIENT
Start: 2025-08-29 | End: 2025-08-29

## 2025-08-29 RX ORDER — OXYCODONE AND ACETAMINOPHEN 5; 325 MG/1; MG/1
1 TABLET ORAL ONCE
Refills: 0 | Status: DISCONTINUED | OUTPATIENT
Start: 2025-08-29 | End: 2025-08-29

## 2025-08-29 RX ADMIN — HYDROMORPHONE HYDROCHLORIDE 1 MG: 1 INJECTION, SOLUTION INTRAMUSCULAR; INTRAVENOUS; SUBCUTANEOUS at 13:44

## 2025-08-29 RX ADMIN — TAMSULOSIN HYDROCHLORIDE 0.4 MG: 0.4 CAPSULE ORAL at 12:36

## 2025-08-29 RX ADMIN — MORPHINE SULFATE 4 MG: 4 INJECTION, SOLUTION INTRAMUSCULAR; INTRAVENOUS at 11:20

## 2025-08-29 RX ADMIN — PROCHLORPERAZINE EDISYLATE 10 MG: 5 INJECTION INTRAMUSCULAR; INTRAVENOUS at 12:37

## 2025-08-29 RX ADMIN — OXYCODONE AND ACETAMINOPHEN 2 TABLET: 5; 325 TABLET ORAL at 12:37

## 2025-08-29 RX ADMIN — SODIUM CHLORIDE 1000 ML: 9 INJECTION, SOLUTION INTRAVENOUS at 11:02

## 2025-08-29 RX ADMIN — ONDANSETRON 4 MG: 2 INJECTION, SOLUTION INTRAMUSCULAR; INTRAVENOUS at 11:03

## 2025-08-29 RX ADMIN — KETOROLAC TROMETHAMINE 30 MG: 30 INJECTION INTRAMUSCULAR; INTRAVENOUS at 11:03

## (undated) DEVICE — FIBR LASR FLEXIVAPULSE365 HOLMIUM FLT/TP 1P/U

## (undated) DEVICE — URETERAL ACCESS SHEATH SET: Brand: NAVIGATOR

## (undated) DEVICE — COR CYSTO: Brand: MEDLINE INDUSTRIES, INC.

## (undated) DEVICE — GOWN,REINF,POLY,ECL,PP SLV,XXL: Brand: MEDLINE

## (undated) DEVICE — ULTRACLEAN ACCESSORY ELECTRODE, 1 INCH COATED NEEDLE WITH EXTENDED INSULATION: Brand: ULTRACLEAN

## (undated) DEVICE — GLV SURG PREMIERPRO MIC LTX PF SZ8 BRN

## (undated) DEVICE — NITINOL STONE RETRIEVAL BASKET: Brand: ZERO TIP

## (undated) DEVICE — SUT GUT CHRM 3/0 SH 27IN G122H

## (undated) DEVICE — DRAPE,LAPAROTOMY,PED,STERILE: Brand: MEDLINE

## (undated) DEVICE — NDL HYPO ECLPS SFTY 25G 1 1/2IN

## (undated) DEVICE — STRAP POSTN KN/BDY FM 5X72IN DISP

## (undated) DEVICE — GW ZIPWIRE STD/SHFT STR TPR/3CM .035IN 150CM

## (undated) DEVICE — PK BASIC 70

## (undated) DEVICE — TRY SKINPREP PVP SCRB W PAINT